# Patient Record
Sex: MALE | Race: WHITE | Employment: OTHER | URBAN - METROPOLITAN AREA
[De-identification: names, ages, dates, MRNs, and addresses within clinical notes are randomized per-mention and may not be internally consistent; named-entity substitution may affect disease eponyms.]

---

## 2017-01-01 ENCOUNTER — ANESTHESIA (OUTPATIENT)
Dept: SURGERY | Age: 82
DRG: 414 | End: 2017-01-01
Payer: MEDICARE

## 2017-01-01 ENCOUNTER — APPOINTMENT (OUTPATIENT)
Dept: NUCLEAR MEDICINE | Age: 82
DRG: 414 | End: 2017-01-01
Payer: MEDICARE

## 2017-01-01 ENCOUNTER — APPOINTMENT (OUTPATIENT)
Dept: GENERAL RADIOLOGY | Age: 82
DRG: 414 | End: 2017-01-01
Attending: INTERNAL MEDICINE
Payer: MEDICARE

## 2017-01-01 ENCOUNTER — HOSPICE ADMISSION (OUTPATIENT)
Dept: HOSPICE | Facility: HOSPICE | Age: 82
End: 2017-01-01

## 2017-01-01 ENCOUNTER — HOSPITAL ENCOUNTER (INPATIENT)
Age: 82
LOS: 14 days | Discharge: HOSPICE | DRG: 414 | End: 2017-03-02
Attending: EMERGENCY MEDICINE | Admitting: INTERNAL MEDICINE
Payer: MEDICARE

## 2017-01-01 ENCOUNTER — APPOINTMENT (OUTPATIENT)
Dept: GENERAL RADIOLOGY | Age: 82
DRG: 414 | End: 2017-01-01
Attending: EMERGENCY MEDICINE
Payer: MEDICARE

## 2017-01-01 ENCOUNTER — APPOINTMENT (OUTPATIENT)
Dept: CT IMAGING | Age: 82
DRG: 414 | End: 2017-01-01
Attending: HOSPITALIST
Payer: MEDICARE

## 2017-01-01 ENCOUNTER — APPOINTMENT (OUTPATIENT)
Dept: ULTRASOUND IMAGING | Age: 82
DRG: 414 | End: 2017-01-01
Attending: INTERNAL MEDICINE
Payer: MEDICARE

## 2017-01-01 ENCOUNTER — APPOINTMENT (OUTPATIENT)
Dept: CT IMAGING | Age: 82
DRG: 414 | End: 2017-01-01
Attending: EMERGENCY MEDICINE
Payer: MEDICARE

## 2017-01-01 ENCOUNTER — APPOINTMENT (OUTPATIENT)
Dept: NUCLEAR MEDICINE | Age: 82
DRG: 414 | End: 2017-01-01
Attending: INTERNAL MEDICINE
Payer: MEDICARE

## 2017-01-01 ENCOUNTER — ANESTHESIA EVENT (OUTPATIENT)
Dept: SURGERY | Age: 82
DRG: 414 | End: 2017-01-01
Payer: MEDICARE

## 2017-01-01 ENCOUNTER — APPOINTMENT (OUTPATIENT)
Dept: CT IMAGING | Age: 82
DRG: 414 | End: 2017-01-01
Attending: INTERNAL MEDICINE
Payer: MEDICARE

## 2017-01-01 ENCOUNTER — APPOINTMENT (OUTPATIENT)
Dept: MRI IMAGING | Age: 82
DRG: 414 | End: 2017-01-01
Attending: INTERNAL MEDICINE
Payer: MEDICARE

## 2017-01-01 VITALS
HEART RATE: 72 BPM | DIASTOLIC BLOOD PRESSURE: 74 MMHG | BODY MASS INDEX: 25.73 KG/M2 | HEIGHT: 69 IN | OXYGEN SATURATION: 92 % | SYSTOLIC BLOOD PRESSURE: 130 MMHG | RESPIRATION RATE: 18 BRPM | TEMPERATURE: 98.2 F | WEIGHT: 173.7 LBS

## 2017-01-01 DIAGNOSIS — R10.11 RUQ ABDOMINAL PAIN: ICD-10-CM

## 2017-01-01 DIAGNOSIS — R07.9 CHEST PAIN, UNSPECIFIED TYPE: Primary | ICD-10-CM

## 2017-01-01 DIAGNOSIS — K81.0 EMPYEMA OF GALLBLADDER: ICD-10-CM

## 2017-01-01 DIAGNOSIS — M54.6 ACUTE MIDLINE THORACIC BACK PAIN: ICD-10-CM

## 2017-01-01 LAB
ABO + RH BLD: NORMAL
ALBUMIN SERPL BCP-MCNC: 1.9 G/DL (ref 3.2–4.6)
ALBUMIN SERPL BCP-MCNC: 2.1 G/DL (ref 3.2–4.6)
ALBUMIN SERPL BCP-MCNC: 2.1 G/DL (ref 3.2–4.6)
ALBUMIN SERPL BCP-MCNC: 2.2 G/DL (ref 3.2–4.6)
ALBUMIN SERPL BCP-MCNC: 3.5 G/DL (ref 3.2–4.6)
ALBUMIN/GLOB SERPL: 0.5 {RATIO} (ref 1.2–3.5)
ALBUMIN/GLOB SERPL: 0.6 {RATIO} (ref 1.2–3.5)
ALBUMIN/GLOB SERPL: 0.9 {RATIO} (ref 1.2–3.5)
ALP SERPL-CCNC: 204 U/L (ref 50–136)
ALP SERPL-CCNC: 253 U/L (ref 50–136)
ALP SERPL-CCNC: 254 U/L (ref 50–136)
ALP SERPL-CCNC: 272 U/L (ref 50–136)
ALP SERPL-CCNC: 63 U/L (ref 50–136)
ALT SERPL-CCNC: 13 U/L (ref 12–65)
ALT SERPL-CCNC: 19 U/L (ref 12–65)
ALT SERPL-CCNC: 21 U/L (ref 12–65)
ALT SERPL-CCNC: 23 U/L (ref 12–65)
ALT SERPL-CCNC: 26 U/L (ref 12–65)
AMORPH CRY URNS QL MICRO: ABNORMAL
ANION GAP BLD CALC-SCNC: 10 MMOL/L (ref 7–16)
ANION GAP BLD CALC-SCNC: 10 MMOL/L (ref 7–16)
ANION GAP BLD CALC-SCNC: 11 MMOL/L (ref 7–16)
ANION GAP BLD CALC-SCNC: 12 MMOL/L (ref 7–16)
ANION GAP BLD CALC-SCNC: 13 MMOL/L (ref 7–16)
ANION GAP BLD CALC-SCNC: 14 MMOL/L (ref 7–16)
ANION GAP BLD CALC-SCNC: 15 MMOL/L (ref 7–16)
ANION GAP BLD CALC-SCNC: 20 MMOL/L (ref 7–16)
ANION GAP BLD CALC-SCNC: 7 MMOL/L (ref 7–16)
ANION GAP BLD CALC-SCNC: 9 MMOL/L (ref 7–16)
APPEARANCE UR: ABNORMAL
APPEARANCE UR: CLEAR
ARTERIAL PATENCY WRIST A: POSITIVE
AST SERPL W P-5'-P-CCNC: 36 U/L (ref 15–37)
AST SERPL W P-5'-P-CCNC: 53 U/L (ref 15–37)
AST SERPL W P-5'-P-CCNC: 56 U/L (ref 15–37)
AST SERPL W P-5'-P-CCNC: 58 U/L (ref 15–37)
AST SERPL W P-5'-P-CCNC: 77 U/L (ref 15–37)
ATRIAL RATE: 59 BPM
ATRIAL RATE: 73 BPM
ATRIAL RATE: 76 BPM
ATRIAL RATE: 87 BPM
BACTERIA SPEC CULT: ABNORMAL
BACTERIA SPEC CULT: ABNORMAL
BACTERIA SPEC CULT: NORMAL
BACTERIA URNS QL MICRO: 0 /HPF
BACTERIA URNS QL MICRO: ABNORMAL /HPF
BASE DEFICIT BLD-SCNC: 4 MMOL/L
BASE DEFICIT BLDA-SCNC: 3.5 MMOL/L (ref 0–2)
BASE DEFICIT BLDA-SCNC: 5.5 MMOL/L (ref 0–2)
BASE DEFICIT BLDA-SCNC: 7.9 MMOL/L (ref 0–2)
BASOPHILS # BLD AUTO: 0 K/UL (ref 0–0.2)
BASOPHILS # BLD: 0 % (ref 0–2)
BDY SITE: ABNORMAL
BILIRUB DIRECT SERPL-MCNC: 0.3 MG/DL
BILIRUB SERPL-MCNC: 0.5 MG/DL (ref 0.2–1.1)
BILIRUB SERPL-MCNC: 0.7 MG/DL (ref 0.2–1.1)
BILIRUB SERPL-MCNC: 0.9 MG/DL (ref 0.2–1.1)
BILIRUB SERPL-MCNC: 1.3 MG/DL (ref 0.2–1.1)
BILIRUB SERPL-MCNC: 1.3 MG/DL (ref 0.2–1.1)
BILIRUB UR QL: ABNORMAL
BILIRUB UR QL: ABNORMAL
BLOOD BANK CMNT PATIENT-IMP: NORMAL
BLOOD GROUP ANTIBODIES SERPL: NORMAL
BUN SERPL-MCNC: 18 MG/DL (ref 8–23)
BUN SERPL-MCNC: 19 MG/DL (ref 8–23)
BUN SERPL-MCNC: 21 MG/DL (ref 8–23)
BUN SERPL-MCNC: 21 MG/DL (ref 8–23)
BUN SERPL-MCNC: 24 MG/DL (ref 8–23)
BUN SERPL-MCNC: 28 MG/DL (ref 8–23)
BUN SERPL-MCNC: 30 MG/DL (ref 8–23)
BUN SERPL-MCNC: 32 MG/DL (ref 8–23)
BUN SERPL-MCNC: 32 MG/DL (ref 8–23)
BUN SERPL-MCNC: 34 MG/DL (ref 8–23)
BUN SERPL-MCNC: 35 MG/DL (ref 8–23)
BUN SERPL-MCNC: 44 MG/DL (ref 8–23)
C3 SERPL-MCNC: 46 MG/DL (ref 82–167)
C4 SERPL-MCNC: 13 MG/DL (ref 14–44)
CA-I BLD-MCNC: 1.09 MMOL/L (ref 1.12–1.32)
CA-I BLD-SCNC: 1.12 MMOL/L (ref 1–1.3)
CALCIUM SERPL-MCNC: 7.5 MG/DL (ref 8.3–10.4)
CALCIUM SERPL-MCNC: 7.7 MG/DL (ref 8.3–10.4)
CALCIUM SERPL-MCNC: 7.9 MG/DL (ref 8.3–10.4)
CALCIUM SERPL-MCNC: 7.9 MG/DL (ref 8.3–10.4)
CALCIUM SERPL-MCNC: 8 MG/DL (ref 8.3–10.4)
CALCIUM SERPL-MCNC: 8.1 MG/DL (ref 8.3–10.4)
CALCIUM SERPL-MCNC: 8.1 MG/DL (ref 8.3–10.4)
CALCIUM SERPL-MCNC: 8.2 MG/DL (ref 8.3–10.4)
CALCIUM SERPL-MCNC: 8.6 MG/DL (ref 8.3–10.4)
CALCIUM SERPL-MCNC: 9 MG/DL (ref 8.3–10.4)
CALCULATED P AXIS, ECG09: 52 DEGREES
CALCULATED P AXIS, ECG09: 61 DEGREES
CALCULATED P AXIS, ECG09: 91 DEGREES
CALCULATED P AXIS, ECG09: NORMAL DEGREES
CALCULATED R AXIS, ECG10: -44 DEGREES
CALCULATED R AXIS, ECG10: 117 DEGREES
CALCULATED R AXIS, ECG10: 129 DEGREES
CALCULATED R AXIS, ECG10: 48 DEGREES
CALCULATED T AXIS, ECG11: -12 DEGREES
CALCULATED T AXIS, ECG11: -32 DEGREES
CALCULATED T AXIS, ECG11: -52 DEGREES
CALCULATED T AXIS, ECG11: 28 DEGREES
CASTS URNS QL MICRO: 0 /LPF
CASTS URNS QL MICRO: ABNORMAL /LPF
CHLORIDE BLDA-SCNC: 111 MMOL/L (ref 98–106)
CHLORIDE SERPL-SCNC: 101 MMOL/L (ref 98–107)
CHLORIDE SERPL-SCNC: 101 MMOL/L (ref 98–107)
CHLORIDE SERPL-SCNC: 103 MMOL/L (ref 98–107)
CHLORIDE SERPL-SCNC: 103 MMOL/L (ref 98–107)
CHLORIDE SERPL-SCNC: 106 MMOL/L (ref 98–107)
CHLORIDE SERPL-SCNC: 108 MMOL/L (ref 98–107)
CHLORIDE SERPL-SCNC: 109 MMOL/L (ref 98–107)
CHLORIDE SERPL-SCNC: 110 MMOL/L (ref 98–107)
CHLORIDE SERPL-SCNC: 110 MMOL/L (ref 98–107)
CHOLEST SERPL-MCNC: 127 MG/DL
CO2 SERPL-SCNC: 15 MMOL/L (ref 21–32)
CO2 SERPL-SCNC: 19 MMOL/L (ref 21–32)
CO2 SERPL-SCNC: 21 MMOL/L (ref 21–32)
CO2 SERPL-SCNC: 23 MMOL/L (ref 21–32)
CO2 SERPL-SCNC: 24 MMOL/L (ref 21–32)
CO2 SERPL-SCNC: 24 MMOL/L (ref 21–32)
CO2 SERPL-SCNC: 25 MMOL/L (ref 21–32)
CO2 SERPL-SCNC: 26 MMOL/L (ref 21–32)
CO2 SERPL-SCNC: 26 MMOL/L (ref 21–32)
CO2 SERPL-SCNC: 28 MMOL/L (ref 21–32)
COHGB MFR BLD: 0.3 % (ref 0.5–1.5)
COHGB MFR BLD: 1 % (ref 0.5–1.5)
COHGB MFR BLD: 1.1 % (ref 0.5–1.5)
COLOR UR: ABNORMAL
COLOR UR: ABNORMAL
CREAT SERPL-MCNC: 1.05 MG/DL (ref 0.8–1.5)
CREAT SERPL-MCNC: 1.08 MG/DL (ref 0.8–1.5)
CREAT SERPL-MCNC: 1.14 MG/DL (ref 0.8–1.5)
CREAT SERPL-MCNC: 1.22 MG/DL (ref 0.8–1.5)
CREAT SERPL-MCNC: 1.28 MG/DL (ref 0.8–1.5)
CREAT SERPL-MCNC: 1.51 MG/DL (ref 0.8–1.5)
CREAT SERPL-MCNC: 1.52 MG/DL (ref 0.8–1.5)
CREAT SERPL-MCNC: 1.59 MG/DL (ref 0.8–1.5)
CREAT SERPL-MCNC: 1.72 MG/DL (ref 0.8–1.5)
CREAT SERPL-MCNC: 1.87 MG/DL (ref 0.8–1.5)
CREAT SERPL-MCNC: 1.91 MG/DL (ref 0.8–1.5)
CREAT SERPL-MCNC: 1.93 MG/DL (ref 0.8–1.5)
CREAT SERPL-MCNC: 2.63 MG/DL (ref 0.8–1.5)
DIAGNOSIS, 93000: NORMAL
DIASTOLIC BP, ECG02: NORMAL MMHG
DIFFERENTIAL METHOD BLD: ABNORMAL
DO-HGB BLD-MCNC: 4 % (ref 0–5)
DO-HGB BLD-MCNC: 5 % (ref 0–5)
DO-HGB BLD-MCNC: 6 % (ref 0–5)
EOSINOPHIL # BLD: 0 K/UL (ref 0–0.8)
EOSINOPHIL # BLD: 0.1 K/UL (ref 0–0.8)
EOSINOPHIL # BLD: 0.1 K/UL (ref 0–0.8)
EOSINOPHIL # BLD: 0.2 K/UL (ref 0–0.8)
EOSINOPHIL # BLD: 0.3 K/UL (ref 0–0.8)
EOSINOPHIL NFR BLD: 0 % (ref 0.5–7.8)
EOSINOPHIL NFR BLD: 1 % (ref 0.5–7.8)
EOSINOPHIL NFR BLD: 2 % (ref 0.5–7.8)
EOSINOPHIL NFR BLD: 3 % (ref 0.5–7.8)
EPI CELLS #/AREA URNS HPF: ABNORMAL /HPF
EPI CELLS #/AREA URNS HPF: ABNORMAL /HPF
ERYTHROCYTE [DISTWIDTH] IN BLOOD BY AUTOMATED COUNT: 12.1 % (ref 11.9–14.6)
ERYTHROCYTE [DISTWIDTH] IN BLOOD BY AUTOMATED COUNT: 12.4 % (ref 11.9–14.6)
ERYTHROCYTE [DISTWIDTH] IN BLOOD BY AUTOMATED COUNT: 12.5 % (ref 11.9–14.6)
ERYTHROCYTE [DISTWIDTH] IN BLOOD BY AUTOMATED COUNT: 12.6 % (ref 11.9–14.6)
ERYTHROCYTE [DISTWIDTH] IN BLOOD BY AUTOMATED COUNT: 12.7 % (ref 11.9–14.6)
ERYTHROCYTE [DISTWIDTH] IN BLOOD BY AUTOMATED COUNT: 12.9 % (ref 11.9–14.6)
ERYTHROCYTE [DISTWIDTH] IN BLOOD BY AUTOMATED COUNT: 13 % (ref 11.9–14.6)
ERYTHROCYTE [DISTWIDTH] IN BLOOD BY AUTOMATED COUNT: 13.1 % (ref 11.9–14.6)
ERYTHROCYTE [DISTWIDTH] IN BLOOD BY AUTOMATED COUNT: 13.1 % (ref 11.9–14.6)
ERYTHROCYTE [DISTWIDTH] IN BLOOD BY AUTOMATED COUNT: 13.7 % (ref 11.9–14.6)
ERYTHROCYTE [DISTWIDTH] IN BLOOD BY AUTOMATED COUNT: 13.7 % (ref 11.9–14.6)
FLUAV AG NPH QL IA: NEGATIVE
FLUBV AG NPH QL IA: NEGATIVE
FOLATE SERPL-MCNC: 19.9 NG/ML (ref 3.1–17.5)
GAS FLOW.O2 O2 DELIVERY SYS: 3 L/MIN
GAS FLOW.O2 O2 DELIVERY SYS: 4 L/MIN
GAS FLOW.O2 O2 DELIVERY SYS: 4 L/MIN
GLOBULIN SER CALC-MCNC: 3.9 G/DL (ref 2.3–3.5)
GLOBULIN SER CALC-MCNC: 4 G/DL (ref 2.3–3.5)
GLOBULIN SER CALC-MCNC: 4.1 G/DL (ref 2.3–3.5)
GLOBULIN SER CALC-MCNC: 4.1 G/DL (ref 2.3–3.5)
GLOBULIN SER CALC-MCNC: 4.4 G/DL (ref 2.3–3.5)
GLUCOSE BLD STRIP.AUTO-MCNC: 100 MG/DL (ref 65–100)
GLUCOSE BLD STRIP.AUTO-MCNC: 102 MG/DL (ref 65–100)
GLUCOSE BLD STRIP.AUTO-MCNC: 110 MG/DL (ref 65–100)
GLUCOSE BLD STRIP.AUTO-MCNC: 115 MG/DL (ref 65–100)
GLUCOSE BLD STRIP.AUTO-MCNC: 116 MG/DL (ref 65–100)
GLUCOSE BLD STRIP.AUTO-MCNC: 116 MG/DL (ref 65–100)
GLUCOSE BLD STRIP.AUTO-MCNC: 122 MG/DL (ref 65–100)
GLUCOSE BLD STRIP.AUTO-MCNC: 125 MG/DL (ref 65–100)
GLUCOSE BLD STRIP.AUTO-MCNC: 125 MG/DL (ref 65–100)
GLUCOSE BLD STRIP.AUTO-MCNC: 126 MG/DL (ref 65–100)
GLUCOSE BLD STRIP.AUTO-MCNC: 127 MG/DL (ref 65–100)
GLUCOSE BLD STRIP.AUTO-MCNC: 129 MG/DL (ref 65–100)
GLUCOSE BLD STRIP.AUTO-MCNC: 130 MG/DL (ref 65–100)
GLUCOSE BLD STRIP.AUTO-MCNC: 131 MG/DL (ref 65–100)
GLUCOSE BLD STRIP.AUTO-MCNC: 132 MG/DL (ref 65–100)
GLUCOSE BLD STRIP.AUTO-MCNC: 132 MG/DL (ref 65–100)
GLUCOSE BLD STRIP.AUTO-MCNC: 133 MG/DL (ref 65–100)
GLUCOSE BLD STRIP.AUTO-MCNC: 137 MG/DL (ref 65–100)
GLUCOSE BLD STRIP.AUTO-MCNC: 138 MG/DL (ref 65–100)
GLUCOSE BLD STRIP.AUTO-MCNC: 141 MG/DL (ref 65–100)
GLUCOSE BLD STRIP.AUTO-MCNC: 142 MG/DL (ref 65–100)
GLUCOSE BLD STRIP.AUTO-MCNC: 144 MG/DL (ref 65–100)
GLUCOSE BLD STRIP.AUTO-MCNC: 145 MG/DL (ref 65–100)
GLUCOSE BLD STRIP.AUTO-MCNC: 146 MG/DL (ref 65–100)
GLUCOSE BLD STRIP.AUTO-MCNC: 148 MG/DL (ref 65–100)
GLUCOSE BLD STRIP.AUTO-MCNC: 151 MG/DL (ref 65–100)
GLUCOSE BLD STRIP.AUTO-MCNC: 151 MG/DL (ref 65–100)
GLUCOSE BLD STRIP.AUTO-MCNC: 153 MG/DL (ref 65–100)
GLUCOSE BLD STRIP.AUTO-MCNC: 156 MG/DL (ref 65–100)
GLUCOSE BLD STRIP.AUTO-MCNC: 159 MG/DL (ref 65–100)
GLUCOSE BLD STRIP.AUTO-MCNC: 162 MG/DL (ref 65–100)
GLUCOSE BLD STRIP.AUTO-MCNC: 168 MG/DL (ref 65–100)
GLUCOSE BLD STRIP.AUTO-MCNC: 171 MG/DL (ref 65–100)
GLUCOSE BLD STRIP.AUTO-MCNC: 174 MG/DL (ref 65–100)
GLUCOSE BLD STRIP.AUTO-MCNC: 176 MG/DL (ref 65–100)
GLUCOSE BLD STRIP.AUTO-MCNC: 177 MG/DL (ref 65–100)
GLUCOSE BLD STRIP.AUTO-MCNC: 180 MG/DL (ref 65–100)
GLUCOSE BLD STRIP.AUTO-MCNC: 184 MG/DL (ref 65–100)
GLUCOSE BLD STRIP.AUTO-MCNC: 185 MG/DL (ref 65–100)
GLUCOSE BLD STRIP.AUTO-MCNC: 186 MG/DL (ref 65–100)
GLUCOSE BLD STRIP.AUTO-MCNC: 189 MG/DL (ref 65–100)
GLUCOSE BLD STRIP.AUTO-MCNC: 195 MG/DL (ref 65–100)
GLUCOSE BLD STRIP.AUTO-MCNC: 195 MG/DL (ref 65–100)
GLUCOSE BLD STRIP.AUTO-MCNC: 196 MG/DL (ref 65–100)
GLUCOSE BLD STRIP.AUTO-MCNC: 196 MG/DL (ref 70–105)
GLUCOSE BLD STRIP.AUTO-MCNC: 199 MG/DL (ref 65–100)
GLUCOSE BLD STRIP.AUTO-MCNC: 209 MG/DL (ref 65–100)
GLUCOSE BLD STRIP.AUTO-MCNC: 215 MG/DL (ref 65–100)
GLUCOSE BLD STRIP.AUTO-MCNC: 217 MG/DL (ref 65–100)
GLUCOSE BLD STRIP.AUTO-MCNC: 218 MG/DL (ref 65–100)
GLUCOSE BLD STRIP.AUTO-MCNC: 218 MG/DL (ref 65–100)
GLUCOSE BLD STRIP.AUTO-MCNC: 219 MG/DL (ref 65–100)
GLUCOSE BLD STRIP.AUTO-MCNC: 220 MG/DL (ref 65–100)
GLUCOSE BLD STRIP.AUTO-MCNC: 228 MG/DL (ref 65–100)
GLUCOSE BLD STRIP.AUTO-MCNC: 233 MG/DL (ref 65–100)
GLUCOSE BLD STRIP.AUTO-MCNC: 271 MG/DL (ref 65–100)
GLUCOSE BLD STRIP.AUTO-MCNC: 273 MG/DL (ref 65–100)
GLUCOSE BLD STRIP.AUTO-MCNC: 317 MG/DL (ref 65–100)
GLUCOSE SERPL-MCNC: 105 MG/DL (ref 65–100)
GLUCOSE SERPL-MCNC: 116 MG/DL (ref 65–100)
GLUCOSE SERPL-MCNC: 120 MG/DL (ref 65–100)
GLUCOSE SERPL-MCNC: 122 MG/DL (ref 65–100)
GLUCOSE SERPL-MCNC: 132 MG/DL (ref 65–100)
GLUCOSE SERPL-MCNC: 135 MG/DL (ref 65–100)
GLUCOSE SERPL-MCNC: 139 MG/DL (ref 65–100)
GLUCOSE SERPL-MCNC: 143 MG/DL (ref 65–100)
GLUCOSE SERPL-MCNC: 168 MG/DL (ref 65–100)
GLUCOSE SERPL-MCNC: 176 MG/DL (ref 65–100)
GLUCOSE SERPL-MCNC: 186 MG/DL (ref 65–100)
GLUCOSE SERPL-MCNC: 197 MG/DL (ref 65–100)
GLUCOSE UR STRIP.AUTO-MCNC: NEGATIVE MG/DL
GLUCOSE UR STRIP.AUTO-MCNC: NEGATIVE MG/DL
GRAM STN SPEC: ABNORMAL
GRAM STN SPEC: ABNORMAL
HCO3 BLD-SCNC: 22.6 MMOL/L (ref 22–26)
HCO3 BLDA-SCNC: 15 MMOL/L (ref 22–26)
HCO3 BLDA-SCNC: 18 MMOL/L (ref 22–26)
HCO3 BLDA-SCNC: 21 MMOL/L (ref 22–26)
HCT VFR BLD AUTO: 24.7 % (ref 41.1–50.3)
HCT VFR BLD AUTO: 26 % (ref 41.1–50.3)
HCT VFR BLD AUTO: 26.4 % (ref 41.1–50.3)
HCT VFR BLD AUTO: 27.6 % (ref 41.1–50.3)
HCT VFR BLD AUTO: 28.3 % (ref 41.1–50.3)
HCT VFR BLD AUTO: 28.6 % (ref 41.1–50.3)
HCT VFR BLD AUTO: 29 % (ref 41.1–50.3)
HCT VFR BLD AUTO: 29.8 % (ref 41.1–50.3)
HCT VFR BLD AUTO: 31.1 % (ref 41.1–50.3)
HCT VFR BLD AUTO: 31.9 % (ref 41.1–50.3)
HCT VFR BLD AUTO: 35.8 % (ref 41.1–50.3)
HCT VFR BLD AUTO: 36.1 % (ref 41.1–50.3)
HCT VFR BLD AUTO: 39.3 % (ref 41.1–50.3)
HDLC SERPL-MCNC: 51 MG/DL (ref 40–60)
HDLC SERPL: 2.5 {RATIO}
HGB BLD-MCNC: 10.6 G/DL (ref 13.6–17.2)
HGB BLD-MCNC: 10.8 G/DL (ref 13.6–17.2)
HGB BLD-MCNC: 12.3 G/DL (ref 13.6–17.2)
HGB BLD-MCNC: 12.4 G/DL (ref 13.6–17.2)
HGB BLD-MCNC: 13.5 G/DL (ref 13.6–17.2)
HGB BLD-MCNC: 8 G/DL (ref 13.6–17.2)
HGB BLD-MCNC: 8.5 G/DL (ref 13.6–17.2)
HGB BLD-MCNC: 8.6 G/DL (ref 13.6–17.2)
HGB BLD-MCNC: 9.4 G/DL (ref 13.6–17.2)
HGB BLD-MCNC: 9.5 G/DL (ref 13.6–17.2)
HGB BLD-MCNC: 9.6 G/DL (ref 13.6–17.2)
HGB BLD-MCNC: 9.8 G/DL (ref 13.6–17.2)
HGB BLD-MCNC: 9.9 G/DL (ref 13.6–17.2)
HGB BLDMV-MCNC: 8.5 GM/DL (ref 11.7–15)
HGB BLDMV-MCNC: 9.1 GM/DL (ref 11.7–15)
HGB BLDMV-MCNC: 9.5 GM/DL (ref 11.7–15)
HGB UR QL STRIP: ABNORMAL
HGB UR QL STRIP: NEGATIVE
IMM GRANULOCYTES # BLD: 0 K/UL (ref 0–0.5)
IMM GRANULOCYTES # BLD: 0.1 K/UL (ref 0–0.5)
IMM GRANULOCYTES NFR BLD AUTO: 0 % (ref 0–5)
IMM GRANULOCYTES NFR BLD AUTO: 0 % (ref 0–5)
IMM GRANULOCYTES NFR BLD AUTO: 0.3 % (ref 0–5)
IMM GRANULOCYTES NFR BLD AUTO: 0.3 % (ref 0–5)
IMM GRANULOCYTES NFR BLD AUTO: 0.7 % (ref 0–5)
IMM GRANULOCYTES NFR BLD AUTO: 0.9 % (ref 0–5)
KETONES UR QL STRIP.AUTO: ABNORMAL MG/DL
KETONES UR QL STRIP.AUTO: ABNORMAL MG/DL
LACTATE SERPL-SCNC: 4.1 MMOL/L (ref 0.4–2)
LDLC SERPL CALC-MCNC: 60 MG/DL
LEUKOCYTE ESTERASE UR QL STRIP.AUTO: ABNORMAL
LEUKOCYTE ESTERASE UR QL STRIP.AUTO: NEGATIVE
LIPASE SERPL-CCNC: 142 U/L (ref 73–393)
LIPID PROFILE,FLP: NORMAL
LYMPHOCYTES # BLD AUTO: 11 % (ref 13–44)
LYMPHOCYTES # BLD AUTO: 12 % (ref 13–44)
LYMPHOCYTES # BLD AUTO: 17 % (ref 13–44)
LYMPHOCYTES # BLD AUTO: 20 % (ref 13–44)
LYMPHOCYTES # BLD AUTO: 21 % (ref 13–44)
LYMPHOCYTES # BLD AUTO: 23 % (ref 13–44)
LYMPHOCYTES # BLD AUTO: 25 % (ref 13–44)
LYMPHOCYTES # BLD AUTO: 27 % (ref 13–44)
LYMPHOCYTES # BLD AUTO: 27 % (ref 13–44)
LYMPHOCYTES # BLD: 1.1 K/UL (ref 0.5–4.6)
LYMPHOCYTES # BLD: 1.3 K/UL (ref 0.5–4.6)
LYMPHOCYTES # BLD: 1.5 K/UL (ref 0.5–4.6)
LYMPHOCYTES # BLD: 1.8 K/UL (ref 0.5–4.6)
LYMPHOCYTES # BLD: 1.8 K/UL (ref 0.5–4.6)
LYMPHOCYTES # BLD: 1.9 K/UL (ref 0.5–4.6)
LYMPHOCYTES # BLD: 2 K/UL (ref 0.5–4.6)
LYMPHOCYTES # BLD: 2.2 K/UL (ref 0.5–4.6)
LYMPHOCYTES # BLD: 2.3 K/UL (ref 0.5–4.6)
MAGNESIUM SERPL-MCNC: 1.9 MG/DL (ref 1.8–2.4)
MAGNESIUM SERPL-MCNC: 2 MG/DL (ref 1.8–2.4)
MAGNESIUM SERPL-MCNC: 2.2 MG/DL (ref 1.8–2.4)
MCH RBC QN AUTO: 32.2 PG (ref 26.1–32.9)
MCH RBC QN AUTO: 32.4 PG (ref 26.1–32.9)
MCH RBC QN AUTO: 32.6 PG (ref 26.1–32.9)
MCH RBC QN AUTO: 32.7 PG (ref 26.1–32.9)
MCH RBC QN AUTO: 32.7 PG (ref 26.1–32.9)
MCH RBC QN AUTO: 32.8 PG (ref 26.1–32.9)
MCH RBC QN AUTO: 32.8 PG (ref 26.1–32.9)
MCH RBC QN AUTO: 33.1 PG (ref 26.1–32.9)
MCH RBC QN AUTO: 33.2 PG (ref 26.1–32.9)
MCH RBC QN AUTO: 33.4 PG (ref 26.1–32.9)
MCH RBC QN AUTO: 33.4 PG (ref 26.1–32.9)
MCHC RBC AUTO-ENTMCNC: 32.6 G/DL (ref 31.4–35)
MCHC RBC AUTO-ENTMCNC: 32.7 G/DL (ref 31.4–35)
MCHC RBC AUTO-ENTMCNC: 33.2 G/DL (ref 31.4–35)
MCHC RBC AUTO-ENTMCNC: 33.6 G/DL (ref 31.4–35)
MCHC RBC AUTO-ENTMCNC: 33.6 G/DL (ref 31.4–35)
MCHC RBC AUTO-ENTMCNC: 33.9 G/DL (ref 31.4–35)
MCHC RBC AUTO-ENTMCNC: 34.1 G/DL (ref 31.4–35)
MCHC RBC AUTO-ENTMCNC: 34.4 G/DL (ref 31.4–35)
MCHC RBC AUTO-ENTMCNC: 34.6 G/DL (ref 31.4–35)
MCV RBC AUTO: 100.4 FL (ref 79.6–97.8)
MCV RBC AUTO: 100.4 FL (ref 79.6–97.8)
MCV RBC AUTO: 95.7 FL (ref 79.6–97.8)
MCV RBC AUTO: 96.2 FL (ref 79.6–97.8)
MCV RBC AUTO: 96.5 FL (ref 79.6–97.8)
MCV RBC AUTO: 96.6 FL (ref 79.6–97.8)
MCV RBC AUTO: 97.1 FL (ref 79.6–97.8)
MCV RBC AUTO: 97.3 FL (ref 79.6–97.8)
MCV RBC AUTO: 97.3 FL (ref 79.6–97.8)
MCV RBC AUTO: 97.6 FL (ref 79.6–97.8)
MCV RBC AUTO: 97.9 FL (ref 79.6–97.8)
METHGB MFR BLD: 0.3 % (ref 0–1.5)
METHGB MFR BLD: 0.3 % (ref 0–1.5)
METHGB MFR BLD: 0.4 % (ref 0–1.5)
MM INDURATION POC: NORMAL MM (ref 0–5)
MONOCYTES # BLD: 0.6 K/UL (ref 0.1–1.3)
MONOCYTES # BLD: 0.9 K/UL (ref 0.1–1.3)
MONOCYTES # BLD: 1 K/UL (ref 0.1–1.3)
MONOCYTES # BLD: 1.1 K/UL (ref 0.1–1.3)
MONOCYTES # BLD: 1.5 K/UL (ref 0.1–1.3)
MONOCYTES NFR BLD AUTO: 10 % (ref 4–12)
MONOCYTES NFR BLD AUTO: 11 % (ref 4–12)
MONOCYTES NFR BLD AUTO: 12 % (ref 4–12)
MONOCYTES NFR BLD AUTO: 12 % (ref 4–12)
MONOCYTES NFR BLD AUTO: 17 % (ref 4–12)
MONOCYTES NFR BLD AUTO: 6 % (ref 4–12)
MONOCYTES NFR BLD AUTO: 7 % (ref 4–12)
MONOCYTES NFR BLD AUTO: 8 % (ref 4–12)
MONOCYTES NFR BLD AUTO: 9 % (ref 4–12)
NEUTS SEG # BLD: 4.2 K/UL (ref 1.7–8.2)
NEUTS SEG # BLD: 4.7 K/UL (ref 1.7–8.2)
NEUTS SEG # BLD: 5.2 K/UL (ref 1.7–8.2)
NEUTS SEG # BLD: 5.5 K/UL (ref 1.7–8.2)
NEUTS SEG # BLD: 6 K/UL (ref 1.7–8.2)
NEUTS SEG # BLD: 6.1 K/UL (ref 1.7–8.2)
NEUTS SEG # BLD: 6.5 K/UL (ref 1.7–8.2)
NEUTS SEG # BLD: 7.9 K/UL (ref 1.7–8.2)
NEUTS SEG # BLD: 9.5 K/UL (ref 1.7–8.2)
NEUTS SEG NFR BLD AUTO: 58 % (ref 43–78)
NEUTS SEG NFR BLD AUTO: 60 % (ref 43–78)
NEUTS SEG NFR BLD AUTO: 62 % (ref 43–78)
NEUTS SEG NFR BLD AUTO: 63 % (ref 43–78)
NEUTS SEG NFR BLD AUTO: 65 % (ref 43–78)
NEUTS SEG NFR BLD AUTO: 66 % (ref 43–78)
NEUTS SEG NFR BLD AUTO: 71 % (ref 43–78)
NEUTS SEG NFR BLD AUTO: 79 % (ref 43–78)
NEUTS SEG NFR BLD AUTO: 81 % (ref 43–78)
NITRITE UR QL STRIP.AUTO: NEGATIVE
NITRITE UR QL STRIP.AUTO: NEGATIVE
OTHER OBSERVATIONS,UCOM: ABNORMAL
OXYHGB MFR BLDA: 92.2 % (ref 94–97)
OXYHGB MFR BLDA: 94.3 % (ref 94–97)
OXYHGB MFR BLDA: 94.9 % (ref 94–97)
P-R INTERVAL, ECG05: 180 MS
P-R INTERVAL, ECG05: 184 MS
P-R INTERVAL, ECG05: 224 MS
P-R INTERVAL, ECG05: NORMAL MS
PCO2 BLD: 44.6 MMHG (ref 35–45)
PCO2 BLDA: 23 MMHG (ref 35–45)
PCO2 BLDA: 27 MMHG (ref 35–45)
PCO2 BLDA: 36 MMHG (ref 35–45)
PH BLD: 7.31 [PH] (ref 7.35–7.45)
PH BLDA: 7.39 [PH] (ref 7.35–7.45)
PH BLDA: 7.43 [PH] (ref 7.35–7.45)
PH BLDA: 7.44 [PH] (ref 7.35–7.45)
PH UR STRIP: 5 [PH] (ref 5–9)
PH UR STRIP: 7.5 [PH] (ref 5–9)
PHOSPHATE SERPL-MCNC: 2.6 MG/DL (ref 2.3–3.7)
PHOSPHATE SERPL-MCNC: 2.6 MG/DL (ref 2.3–3.7)
PLATELET # BLD AUTO: 112 K/UL (ref 150–450)
PLATELET # BLD AUTO: 122 K/UL (ref 150–450)
PLATELET # BLD AUTO: 136 K/UL (ref 150–450)
PLATELET # BLD AUTO: 137 K/UL (ref 150–450)
PLATELET # BLD AUTO: 165 K/UL (ref 150–450)
PLATELET # BLD AUTO: 191 K/UL (ref 150–450)
PLATELET # BLD AUTO: 193 K/UL (ref 150–450)
PLATELET # BLD AUTO: 205 K/UL (ref 150–450)
PLATELET # BLD AUTO: 218 K/UL (ref 150–450)
PLATELET # BLD AUTO: 233 K/UL (ref 150–450)
PLATELET # BLD AUTO: 274 K/UL (ref 150–450)
PLATELET COMMENTS,PCOM: ADEQUATE
PMV BLD AUTO: 10 FL (ref 10.8–14.1)
PMV BLD AUTO: 10.4 FL (ref 10.8–14.1)
PMV BLD AUTO: 10.4 FL (ref 10.8–14.1)
PMV BLD AUTO: 10.5 FL (ref 10.8–14.1)
PMV BLD AUTO: 10.5 FL (ref 10.8–14.1)
PMV BLD AUTO: 10.7 FL (ref 10.8–14.1)
PMV BLD AUTO: 10.7 FL (ref 10.8–14.1)
PMV BLD AUTO: 10.8 FL (ref 10.8–14.1)
PMV BLD AUTO: 10.9 FL (ref 10.8–14.1)
PMV BLD AUTO: 10.9 FL (ref 10.8–14.1)
PMV BLD AUTO: 11 FL (ref 10.8–14.1)
PO2 BLD: 230 MMHG (ref 80–105)
PO2 BLDA: 75 MMHG (ref 75–100)
PO2 BLDA: 83 MMHG (ref 75–100)
PO2 BLDA: 86 MMHG (ref 75–100)
POTASSIUM BLD-SCNC: 4.3 MMOL/L (ref 3.5–5.1)
POTASSIUM BLDA-SCNC: 3.84 MMOL/L (ref 3.5–5.3)
POTASSIUM SERPL-SCNC: 3.3 MMOL/L (ref 3.5–5.1)
POTASSIUM SERPL-SCNC: 3.5 MMOL/L (ref 3.5–5.1)
POTASSIUM SERPL-SCNC: 3.6 MMOL/L (ref 3.5–5.1)
POTASSIUM SERPL-SCNC: 3.6 MMOL/L (ref 3.5–5.1)
POTASSIUM SERPL-SCNC: 3.7 MMOL/L (ref 3.5–5.1)
POTASSIUM SERPL-SCNC: 3.8 MMOL/L (ref 3.5–5.1)
POTASSIUM SERPL-SCNC: 4 MMOL/L (ref 3.5–5.1)
POTASSIUM SERPL-SCNC: 4.5 MMOL/L (ref 3.5–5.1)
POTASSIUM SERPL-SCNC: 4.6 MMOL/L (ref 3.5–5.1)
POTASSIUM SERPL-SCNC: 5.7 MMOL/L (ref 3.5–5.1)
POTASSIUM SERPL-SCNC: 5.8 MMOL/L (ref 3.5–5.1)
POTASSIUM SERPL-SCNC: 5.9 MMOL/L (ref 3.5–5.1)
POTASSIUM SERPL-SCNC: 6.2 MMOL/L (ref 3.5–5.1)
PPD POC: NORMAL NEGATIVE
PROCALCITONIN SERPL-MCNC: 1 NG/ML
PROT SERPL-MCNC: 6 G/DL (ref 6.3–8.2)
PROT SERPL-MCNC: 6.1 G/DL (ref 6.3–8.2)
PROT SERPL-MCNC: 6.1 G/DL (ref 6.3–8.2)
PROT SERPL-MCNC: 6.5 G/DL (ref 6.3–8.2)
PROT SERPL-MCNC: 7.6 G/DL (ref 6.3–8.2)
PROT UR STRIP-MCNC: 100 MG/DL
PROT UR STRIP-MCNC: 30 MG/DL
Q-T INTERVAL, ECG07: 398 MS
Q-T INTERVAL, ECG07: 404 MS
Q-T INTERVAL, ECG07: 456 MS
Q-T INTERVAL, ECG07: 554 MS
QRS DURATION, ECG06: 130 MS
QRS DURATION, ECG06: 130 MS
QRS DURATION, ECG06: 138 MS
QRS DURATION, ECG06: 144 MS
QTC CALCULATION (BEZET), ECG08: 447 MS
QTC CALCULATION (BEZET), ECG08: 451 MS
QTC CALCULATION (BEZET), ECG08: 486 MS
QTC CALCULATION (BEZET), ECG08: 576 MS
RBC # BLD AUTO: 2.59 M/UL (ref 4.23–5.67)
RBC # BLD AUTO: 2.63 M/UL (ref 4.23–5.67)
RBC # BLD AUTO: 2.87 M/UL (ref 4.23–5.67)
RBC # BLD AUTO: 2.93 M/UL (ref 4.23–5.67)
RBC # BLD AUTO: 2.94 M/UL (ref 4.23–5.67)
RBC # BLD AUTO: 3.07 M/UL (ref 4.23–5.67)
RBC # BLD AUTO: 3.25 M/UL (ref 4.23–5.67)
RBC # BLD AUTO: 3.26 M/UL (ref 4.23–5.67)
RBC # BLD AUTO: 3.7 M/UL (ref 4.23–5.67)
RBC # BLD AUTO: 3.71 M/UL (ref 4.23–5.67)
RBC # BLD AUTO: 4.04 M/UL (ref 4.23–5.67)
RBC #/AREA URNS HPF: ABNORMAL /HPF
RBC #/AREA URNS HPF: ABNORMAL /HPF
RBC MORPH BLD: ABNORMAL
SAO2 % BLD: 100 % (ref 95–98)
SAO2 % BLD: 94 % (ref 92–98.5)
SAO2 % BLD: 95 % (ref 92–98.5)
SAO2 % BLD: 96 % (ref 92–98.5)
SERVICE CMNT-IMP: ABNORMAL
SERVICE CMNT-IMP: ABNORMAL
SERVICE CMNT-IMP: NORMAL
SODIUM BLD-SCNC: 138 MMOL/L (ref 138–146)
SODIUM BLDA-SCNC: 139.3 MMOL/L (ref 135–148)
SODIUM SERPL-SCNC: 138 MMOL/L (ref 136–145)
SODIUM SERPL-SCNC: 139 MMOL/L (ref 136–145)
SODIUM SERPL-SCNC: 140 MMOL/L (ref 136–145)
SODIUM SERPL-SCNC: 142 MMOL/L (ref 136–145)
SODIUM SERPL-SCNC: 143 MMOL/L (ref 136–145)
SODIUM SERPL-SCNC: 143 MMOL/L (ref 136–145)
SODIUM SERPL-SCNC: 144 MMOL/L (ref 136–145)
SODIUM SERPL-SCNC: 144 MMOL/L (ref 136–145)
SODIUM SERPL-SCNC: 145 MMOL/L (ref 136–145)
SODIUM SERPL-SCNC: 145 MMOL/L (ref 136–145)
SP GR UR REFRACTOMETRY: 1.03 (ref 1–1.02)
SP GR UR REFRACTOMETRY: 1.05 (ref 1–1.02)
SPECIMEN EXP DATE BLD: NORMAL
SYSTOLIC BP, ECG01: NORMAL MMHG
TRIGL SERPL-MCNC: 80 MG/DL (ref 35–150)
TROPONIN I BLD-MCNC: 0 NG/ML (ref 0–0.08)
TROPONIN I BLD-MCNC: 0 NG/ML (ref 0–0.08)
TROPONIN I SERPL-MCNC: <0.02 NG/ML (ref 0.02–0.05)
TROPONIN I SERPL-MCNC: <0.02 NG/ML (ref 0.02–0.05)
TSH SERPL DL<=0.005 MIU/L-ACNC: 1.41 UIU/ML (ref 0.36–3.74)
UROBILINOGEN UR QL STRIP.AUTO: 1 EU/DL (ref 0.2–1)
UROBILINOGEN UR QL STRIP.AUTO: 1 EU/DL (ref 0.2–1)
VANCOMYCIN TROUGH SERPL-MCNC: 11.1 UG/ML (ref 5–20)
VANCOMYCIN TROUGH SERPL-MCNC: 17.9 UG/ML (ref 5–20)
VANCOMYCIN TROUGH SERPL-MCNC: 19.8 UG/ML (ref 5–20)
VENTILATION MODE VENT: ABNORMAL
VENTRICULAR RATE, ECG03: 59 BPM
VENTRICULAR RATE, ECG03: 65 BPM
VENTRICULAR RATE, ECG03: 76 BPM
VENTRICULAR RATE, ECG03: 87 BPM
VIT B12 SERPL-MCNC: 828 PG/ML (ref 193–986)
VLDLC SERPL CALC-MCNC: 16 MG/DL (ref 6–23)
WBC # BLD AUTO: 10 K/UL (ref 4.3–11.1)
WBC # BLD AUTO: 11.6 K/UL (ref 4.3–11.1)
WBC # BLD AUTO: 17.4 K/UL (ref 4.3–11.1)
WBC # BLD AUTO: 6.9 K/UL (ref 4.3–11.1)
WBC # BLD AUTO: 7.2 K/UL (ref 4.3–11.1)
WBC # BLD AUTO: 8.1 K/UL (ref 4.3–11.1)
WBC # BLD AUTO: 8.7 K/UL (ref 4.3–11.1)
WBC # BLD AUTO: 8.7 K/UL (ref 4.3–11.1)
WBC # BLD AUTO: 9.1 K/UL (ref 4.3–11.1)
WBC # BLD AUTO: 9.2 K/UL (ref 4.3–11.1)
WBC # BLD AUTO: 9.2 K/UL (ref 4.3–11.1)
WBC MORPH BLD: ABNORMAL
WBC URNS QL MICRO: ABNORMAL /HPF
WBC URNS QL MICRO: ABNORMAL /HPF

## 2017-01-01 PROCEDURE — 99218 HC RM OBSERVATION: CPT

## 2017-01-01 PROCEDURE — 74011250636 HC RX REV CODE- 250/636: Performed by: INTERNAL MEDICINE

## 2017-01-01 PROCEDURE — 85018 HEMOGLOBIN: CPT | Performed by: ANESTHESIOLOGY

## 2017-01-01 PROCEDURE — 74011250636 HC RX REV CODE- 250/636

## 2017-01-01 PROCEDURE — 77030019908 HC STETH ESOPH SIMS -A: Performed by: ANESTHESIOLOGY

## 2017-01-01 PROCEDURE — 74011250637 HC RX REV CODE- 250/637: Performed by: INTERNAL MEDICINE

## 2017-01-01 PROCEDURE — 74011250637 HC RX REV CODE- 250/637: Performed by: COLON & RECTAL SURGERY

## 2017-01-01 PROCEDURE — 77030008467 HC STPLR SKN COVD -B: Performed by: COLON & RECTAL SURGERY

## 2017-01-01 PROCEDURE — 82962 GLUCOSE BLOOD TEST: CPT

## 2017-01-01 PROCEDURE — 74011000250 HC RX REV CODE- 250: Performed by: HOSPITALIST

## 2017-01-01 PROCEDURE — 36415 COLL VENOUS BLD VENIPUNCTURE: CPT | Performed by: INTERNAL MEDICINE

## 2017-01-01 PROCEDURE — 74011000258 HC RX REV CODE- 258: Performed by: HOSPITALIST

## 2017-01-01 PROCEDURE — 97161 PT EVAL LOW COMPLEX 20 MIN: CPT

## 2017-01-01 PROCEDURE — 77030019940 HC BLNKT HYPOTHRM STRY -B: Performed by: ANESTHESIOLOGY

## 2017-01-01 PROCEDURE — 97530 THERAPEUTIC ACTIVITIES: CPT

## 2017-01-01 PROCEDURE — 74011250636 HC RX REV CODE- 250/636: Performed by: COLON & RECTAL SURGERY

## 2017-01-01 PROCEDURE — 86900 BLOOD TYPING SEROLOGIC ABO: CPT | Performed by: COLON & RECTAL SURGERY

## 2017-01-01 PROCEDURE — 77030002996 HC SUT SLK J&J -A: Performed by: COLON & RECTAL SURGERY

## 2017-01-01 PROCEDURE — 74011000258 HC RX REV CODE- 258: Performed by: INTERNAL MEDICINE

## 2017-01-01 PROCEDURE — 87205 SMEAR GRAM STAIN: CPT | Performed by: COLON & RECTAL SURGERY

## 2017-01-01 PROCEDURE — 74011250637 HC RX REV CODE- 250/637: Performed by: PHYSICIAN ASSISTANT

## 2017-01-01 PROCEDURE — 36600 WITHDRAWAL OF ARTERIAL BLOOD: CPT

## 2017-01-01 PROCEDURE — 77030016151 HC PROTCTR LNS DFOG COVD -B: Performed by: COLON & RECTAL SURGERY

## 2017-01-01 PROCEDURE — 74011636637 HC RX REV CODE- 636/637: Performed by: PHYSICIAN ASSISTANT

## 2017-01-01 PROCEDURE — 36415 COLL VENOUS BLD VENIPUNCTURE: CPT | Performed by: COLON & RECTAL SURGERY

## 2017-01-01 PROCEDURE — 76010000133 HC OR TIME 3 TO 3.5 HR: Performed by: COLON & RECTAL SURGERY

## 2017-01-01 PROCEDURE — 77030013567 HC DRN WND RESERV BARD -A: Performed by: COLON & RECTAL SURGERY

## 2017-01-01 PROCEDURE — 82565 ASSAY OF CREATININE: CPT | Performed by: INTERNAL MEDICINE

## 2017-01-01 PROCEDURE — 77030010507 HC ADH SKN DERMBND J&J -B: Performed by: COLON & RECTAL SURGERY

## 2017-01-01 PROCEDURE — 87077 CULTURE AEROBIC IDENTIFY: CPT | Performed by: COLON & RECTAL SURGERY

## 2017-01-01 PROCEDURE — 51798 US URINE CAPACITY MEASURE: CPT

## 2017-01-01 PROCEDURE — 71275 CT ANGIOGRAPHY CHEST: CPT

## 2017-01-01 PROCEDURE — 77030002933 HC SUT MCRYL J&J -A: Performed by: COLON & RECTAL SURGERY

## 2017-01-01 PROCEDURE — 74011250637 HC RX REV CODE- 250/637: Performed by: ANESTHESIOLOGY

## 2017-01-01 PROCEDURE — 77030008756 HC TU IRR SUC STRY -B: Performed by: COLON & RECTAL SURGERY

## 2017-01-01 PROCEDURE — 82607 VITAMIN B-12: CPT | Performed by: INTERNAL MEDICINE

## 2017-01-01 PROCEDURE — 77030011640 HC PAD GRND REM COVD -A: Performed by: COLON & RECTAL SURGERY

## 2017-01-01 PROCEDURE — 85025 COMPLETE CBC W/AUTO DIFF WBC: CPT | Performed by: INTERNAL MEDICINE

## 2017-01-01 PROCEDURE — 77030031139 HC SUT VCRL2 J&J -A: Performed by: COLON & RECTAL SURGERY

## 2017-01-01 PROCEDURE — 77030008612 HC TRCR ENDOSC VRS COVD -C: Performed by: COLON & RECTAL SURGERY

## 2017-01-01 PROCEDURE — 70450 CT HEAD/BRAIN W/O DYE: CPT

## 2017-01-01 PROCEDURE — 71010 XR CHEST PORT: CPT

## 2017-01-01 PROCEDURE — 65660000000 HC RM CCU STEPDOWN

## 2017-01-01 PROCEDURE — 97535 SELF CARE MNGMENT TRAINING: CPT

## 2017-01-01 PROCEDURE — 74011000250 HC RX REV CODE- 250: Performed by: INTERNAL MEDICINE

## 2017-01-01 PROCEDURE — 80202 ASSAY OF VANCOMYCIN: CPT | Performed by: INTERNAL MEDICINE

## 2017-01-01 PROCEDURE — 87040 BLOOD CULTURE FOR BACTERIA: CPT | Performed by: PHYSICIAN ASSISTANT

## 2017-01-01 PROCEDURE — 87804 INFLUENZA ASSAY W/OPTIC: CPT | Performed by: NURSE PRACTITIONER

## 2017-01-01 PROCEDURE — 82803 BLOOD GASES ANY COMBINATION: CPT

## 2017-01-01 PROCEDURE — 84443 ASSAY THYROID STIM HORMONE: CPT | Performed by: INTERNAL MEDICINE

## 2017-01-01 PROCEDURE — 84100 ASSAY OF PHOSPHORUS: CPT | Performed by: INTERNAL MEDICINE

## 2017-01-01 PROCEDURE — 93005 ELECTROCARDIOGRAM TRACING: CPT | Performed by: INTERNAL MEDICINE

## 2017-01-01 PROCEDURE — 87040 BLOOD CULTURE FOR BACTERIA: CPT | Performed by: HOSPITALIST

## 2017-01-01 PROCEDURE — 80048 BASIC METABOLIC PNL TOTAL CA: CPT | Performed by: INTERNAL MEDICINE

## 2017-01-01 PROCEDURE — 99153 MOD SED SAME PHYS/QHP EA: CPT

## 2017-01-01 PROCEDURE — 87040 BLOOD CULTURE FOR BACTERIA: CPT | Performed by: INTERNAL MEDICINE

## 2017-01-01 PROCEDURE — 77030032490 HC SLV COMPR SCD KNE COVD -B: Performed by: COLON & RECTAL SURGERY

## 2017-01-01 PROCEDURE — 80053 COMPREHEN METABOLIC PANEL: CPT | Performed by: INTERNAL MEDICINE

## 2017-01-01 PROCEDURE — 84484 ASSAY OF TROPONIN QUANT: CPT

## 2017-01-01 PROCEDURE — 96376 TX/PRO/DX INJ SAME DRUG ADON: CPT | Performed by: EMERGENCY MEDICINE

## 2017-01-01 PROCEDURE — 84484 ASSAY OF TROPONIN QUANT: CPT | Performed by: PHYSICIAN ASSISTANT

## 2017-01-01 PROCEDURE — 87075 CULTR BACTERIA EXCEPT BLOOD: CPT | Performed by: COLON & RECTAL SURGERY

## 2017-01-01 PROCEDURE — 94760 N-INVAS EAR/PLS OXIMETRY 1: CPT

## 2017-01-01 PROCEDURE — 77030011943

## 2017-01-01 PROCEDURE — A9537 TC99M MEBROFENIN: HCPCS

## 2017-01-01 PROCEDURE — 65270000029 HC RM PRIVATE

## 2017-01-01 PROCEDURE — 93312 ECHO TRANSESOPHAGEAL: CPT

## 2017-01-01 PROCEDURE — 83735 ASSAY OF MAGNESIUM: CPT | Performed by: INTERNAL MEDICINE

## 2017-01-01 PROCEDURE — 85025 COMPLETE CBC W/AUTO DIFF WBC: CPT | Performed by: COLON & RECTAL SURGERY

## 2017-01-01 PROCEDURE — 86160 COMPLEMENT ANTIGEN: CPT | Performed by: INTERNAL MEDICINE

## 2017-01-01 PROCEDURE — 80048 BASIC METABOLIC PNL TOTAL CA: CPT | Performed by: COLON & RECTAL SURGERY

## 2017-01-01 PROCEDURE — 76060000037 HC ANESTHESIA 3 TO 3.5 HR: Performed by: COLON & RECTAL SURGERY

## 2017-01-01 PROCEDURE — 92960 CARDIOVERSION ELECTRIC EXT: CPT

## 2017-01-01 PROCEDURE — 81001 URINALYSIS AUTO W/SCOPE: CPT | Performed by: PHYSICIAN ASSISTANT

## 2017-01-01 PROCEDURE — 99285 EMERGENCY DEPT VISIT HI MDM: CPT | Performed by: EMERGENCY MEDICINE

## 2017-01-01 PROCEDURE — 77030000038 HC TIP SCIS LAPSCP SURI -B: Performed by: COLON & RECTAL SURGERY

## 2017-01-01 PROCEDURE — 87186 SC STD MICRODIL/AGAR DIL: CPT | Performed by: COLON & RECTAL SURGERY

## 2017-01-01 PROCEDURE — 76705 ECHO EXAM OF ABDOMEN: CPT

## 2017-01-01 PROCEDURE — 85027 COMPLETE CBC AUTOMATED: CPT | Performed by: PHYSICIAN ASSISTANT

## 2017-01-01 PROCEDURE — 80053 COMPREHEN METABOLIC PANEL: CPT | Performed by: COLON & RECTAL SURGERY

## 2017-01-01 PROCEDURE — 74011250636 HC RX REV CODE- 250/636: Performed by: ANESTHESIOLOGY

## 2017-01-01 PROCEDURE — 74011250636 HC RX REV CODE- 250/636: Performed by: HOSPITALIST

## 2017-01-01 PROCEDURE — 97110 THERAPEUTIC EXERCISES: CPT

## 2017-01-01 PROCEDURE — 92610 EVALUATE SWALLOWING FUNCTION: CPT

## 2017-01-01 PROCEDURE — 83690 ASSAY OF LIPASE: CPT | Performed by: INTERNAL MEDICINE

## 2017-01-01 PROCEDURE — 73030 X-RAY EXAM OF SHOULDER: CPT

## 2017-01-01 PROCEDURE — 74011250636 HC RX REV CODE- 250/636: Performed by: SURGERY

## 2017-01-01 PROCEDURE — 77030035048 HC TRCR ENDOSC OPTCL COVD -B: Performed by: COLON & RECTAL SURGERY

## 2017-01-01 PROCEDURE — 74011000250 HC RX REV CODE- 250: Performed by: COLON & RECTAL SURGERY

## 2017-01-01 PROCEDURE — 86580 TB INTRADERMAL TEST: CPT | Performed by: INTERNAL MEDICINE

## 2017-01-01 PROCEDURE — 74011000258 HC RX REV CODE- 258: Performed by: EMERGENCY MEDICINE

## 2017-01-01 PROCEDURE — 93005 ELECTROCARDIOGRAM TRACING: CPT | Performed by: EMERGENCY MEDICINE

## 2017-01-01 PROCEDURE — 74011250637 HC RX REV CODE- 250/637: Performed by: NURSE PRACTITIONER

## 2017-01-01 PROCEDURE — 74011250636 HC RX REV CODE- 250/636: Performed by: EMERGENCY MEDICINE

## 2017-01-01 PROCEDURE — 85025 COMPLETE CBC W/AUTO DIFF WBC: CPT | Performed by: EMERGENCY MEDICINE

## 2017-01-01 PROCEDURE — 70551 MRI BRAIN STEM W/O DYE: CPT

## 2017-01-01 PROCEDURE — 85027 COMPLETE CBC AUTOMATED: CPT | Performed by: COLON & RECTAL SURGERY

## 2017-01-01 PROCEDURE — 74011250636 HC RX REV CODE- 250/636: Performed by: PHYSICIAN ASSISTANT

## 2017-01-01 PROCEDURE — 74011636320 HC RX REV CODE- 636/320: Performed by: EMERGENCY MEDICINE

## 2017-01-01 PROCEDURE — C8929 TTE W OR WO FOL WCON,DOPPLER: HCPCS

## 2017-01-01 PROCEDURE — 74011000250 HC RX REV CODE- 250

## 2017-01-01 PROCEDURE — 81001 URINALYSIS AUTO W/SCOPE: CPT | Performed by: INTERNAL MEDICINE

## 2017-01-01 PROCEDURE — 74011250637 HC RX REV CODE- 250/637

## 2017-01-01 PROCEDURE — 77030012022 HC APPL CLP ENDOSC COVD -C: Performed by: COLON & RECTAL SURGERY

## 2017-01-01 PROCEDURE — 77030009403 HC ELECTRD ENDO MEGA -B: Performed by: COLON & RECTAL SURGERY

## 2017-01-01 PROCEDURE — 74011000302 HC RX REV CODE- 302: Performed by: INTERNAL MEDICINE

## 2017-01-01 PROCEDURE — 71010 XR CHEST SNGL V: CPT

## 2017-01-01 PROCEDURE — 36415 COLL VENOUS BLD VENIPUNCTURE: CPT | Performed by: PHYSICIAN ASSISTANT

## 2017-01-01 PROCEDURE — 83605 ASSAY OF LACTIC ACID: CPT | Performed by: INTERNAL MEDICINE

## 2017-01-01 PROCEDURE — 81003 URINALYSIS AUTO W/O SCOPE: CPT | Performed by: INTERNAL MEDICINE

## 2017-01-01 PROCEDURE — 77030008703 HC TU ET UNCUF COVD -A: Performed by: ANESTHESIOLOGY

## 2017-01-01 PROCEDURE — 77030018836 HC SOL IRR NACL ICUM -A: Performed by: COLON & RECTAL SURGERY

## 2017-01-01 PROCEDURE — 76210000000 HC OR PH I REC 2 TO 2.5 HR: Performed by: COLON & RECTAL SURGERY

## 2017-01-01 PROCEDURE — 93880 EXTRACRANIAL BILAT STUDY: CPT

## 2017-01-01 PROCEDURE — 77030034850: Performed by: COLON & RECTAL SURGERY

## 2017-01-01 PROCEDURE — 82803 BLOOD GASES ANY COMBINATION: CPT | Performed by: INTERNAL MEDICINE

## 2017-01-01 PROCEDURE — 88304 TISSUE EXAM BY PATHOLOGIST: CPT | Performed by: COLON & RECTAL SURGERY

## 2017-01-01 PROCEDURE — 96375 TX/PRO/DX INJ NEW DRUG ADDON: CPT | Performed by: EMERGENCY MEDICINE

## 2017-01-01 PROCEDURE — 77030035051: Performed by: COLON & RECTAL SURGERY

## 2017-01-01 PROCEDURE — 80076 HEPATIC FUNCTION PANEL: CPT | Performed by: INTERNAL MEDICINE

## 2017-01-01 PROCEDURE — 76450000000

## 2017-01-01 PROCEDURE — 74011250637 HC RX REV CODE- 250/637: Performed by: FAMILY MEDICINE

## 2017-01-01 PROCEDURE — 77030009851 HC PCH RTVR ENDOSC AMR -B: Performed by: COLON & RECTAL SURGERY

## 2017-01-01 PROCEDURE — 84132 ASSAY OF SERUM POTASSIUM: CPT | Performed by: PHYSICIAN ASSISTANT

## 2017-01-01 PROCEDURE — 77030012407 HC DRN WND BARD -B: Performed by: COLON & RECTAL SURGERY

## 2017-01-01 PROCEDURE — 87086 URINE CULTURE/COLONY COUNT: CPT | Performed by: PHYSICIAN ASSISTANT

## 2017-01-01 PROCEDURE — 84145 PROCALCITONIN (PCT): CPT | Performed by: INTERNAL MEDICINE

## 2017-01-01 PROCEDURE — 80053 COMPREHEN METABOLIC PANEL: CPT | Performed by: EMERGENCY MEDICINE

## 2017-01-01 PROCEDURE — 74176 CT ABD & PELVIS W/O CONTRAST: CPT

## 2017-01-01 PROCEDURE — 74011636320 HC RX REV CODE- 636/320: Performed by: INTERNAL MEDICINE

## 2017-01-01 PROCEDURE — 0FT40ZZ RESECTION OF GALLBLADDER, OPEN APPROACH: ICD-10-PCS | Performed by: COLON & RECTAL SURGERY

## 2017-01-01 PROCEDURE — 82947 ASSAY GLUCOSE BLOOD QUANT: CPT

## 2017-01-01 PROCEDURE — 80061 LIPID PANEL: CPT | Performed by: PHYSICIAN ASSISTANT

## 2017-01-01 PROCEDURE — 74011250637 HC RX REV CODE- 250/637: Performed by: EMERGENCY MEDICINE

## 2017-01-01 PROCEDURE — 77030011264 HC ELECTRD BLD EXT COVD -A: Performed by: COLON & RECTAL SURGERY

## 2017-01-01 PROCEDURE — 77030008477 HC STYL SATN SLP COVD -A: Performed by: ANESTHESIOLOGY

## 2017-01-01 PROCEDURE — 96374 THER/PROPH/DIAG INJ IV PUSH: CPT | Performed by: EMERGENCY MEDICINE

## 2017-01-01 PROCEDURE — 77030034849

## 2017-01-01 PROCEDURE — 0T9B70Z DRAINAGE OF BLADDER WITH DRAINAGE DEVICE, VIA NATURAL OR ARTIFICIAL OPENING: ICD-10-PCS | Performed by: COLON & RECTAL SURGERY

## 2017-01-01 PROCEDURE — 85018 HEMOGLOBIN: CPT | Performed by: COLON & RECTAL SURGERY

## 2017-01-01 PROCEDURE — 97166 OT EVAL MOD COMPLEX 45 MIN: CPT

## 2017-01-01 PROCEDURE — 82746 ASSAY OF FOLIC ACID SERUM: CPT | Performed by: INTERNAL MEDICINE

## 2017-01-01 PROCEDURE — 99152 MOD SED SAME PHYS/QHP 5/>YRS: CPT

## 2017-01-01 PROCEDURE — 77030002966 HC SUT PDS J&J -A: Performed by: COLON & RECTAL SURGERY

## 2017-01-01 PROCEDURE — 80048 BASIC METABOLIC PNL TOTAL CA: CPT | Performed by: PHYSICIAN ASSISTANT

## 2017-01-01 PROCEDURE — 77030008518 HC TBNG INSUF ENDO STRY -B: Performed by: COLON & RECTAL SURGERY

## 2017-01-01 PROCEDURE — 77030008771 HC TU NG SALEM SUMP -A: Performed by: ANESTHESIOLOGY

## 2017-01-01 PROCEDURE — 77010033678 HC OXYGEN DAILY

## 2017-01-01 RX ORDER — ACETAMINOPHEN 500 MG
2 TABLET ORAL
COMMUNITY
End: 2017-01-01

## 2017-01-01 RX ORDER — SODIUM CHLORIDE 0.9 % (FLUSH) 0.9 %
10 SYRINGE (ML) INJECTION
Status: COMPLETED | OUTPATIENT
Start: 2017-01-01 | End: 2017-01-01

## 2017-01-01 RX ORDER — FENTANYL CITRATE 50 UG/ML
100 INJECTION, SOLUTION INTRAMUSCULAR; INTRAVENOUS AS NEEDED
Status: DISCONTINUED | OUTPATIENT
Start: 2017-01-01 | End: 2017-01-01 | Stop reason: HOSPADM

## 2017-01-01 RX ORDER — OSELTAMIVIR PHOSPHATE 30 MG/1
30 CAPSULE ORAL EVERY 12 HOURS
Status: DISCONTINUED | OUTPATIENT
Start: 2017-01-01 | End: 2017-01-01

## 2017-01-01 RX ORDER — FAMOTIDINE 10 MG/ML
20 INJECTION INTRAVENOUS ONCE
Status: DISCONTINUED | OUTPATIENT
Start: 2017-01-01 | End: 2017-01-01 | Stop reason: HOSPADM

## 2017-01-01 RX ORDER — SODIUM CHLORIDE 0.9 % (FLUSH) 0.9 %
5-10 SYRINGE (ML) INJECTION AS NEEDED
Status: DISCONTINUED | OUTPATIENT
Start: 2017-01-01 | End: 2017-01-01 | Stop reason: HOSPADM

## 2017-01-01 RX ORDER — TRAMADOL HYDROCHLORIDE 50 MG/1
50 TABLET ORAL
Status: DISCONTINUED | OUTPATIENT
Start: 2017-01-01 | End: 2017-01-01

## 2017-01-01 RX ORDER — PROMETHAZINE HYDROCHLORIDE 25 MG/1
25 TABLET ORAL
Status: DISCONTINUED | OUTPATIENT
Start: 2017-01-01 | End: 2017-01-01

## 2017-01-01 RX ORDER — ATORVASTATIN CALCIUM 40 MG/1
40 TABLET, FILM COATED ORAL
Status: DISCONTINUED | OUTPATIENT
Start: 2017-01-01 | End: 2017-01-01

## 2017-01-01 RX ORDER — POLYETHYLENE GLYCOL 3350 17 G/17G
17 POWDER, FOR SOLUTION ORAL EVERY OTHER DAY
COMMUNITY
End: 2017-01-01

## 2017-01-01 RX ORDER — MORPHINE SULFATE 10 MG/ML
2 INJECTION, SOLUTION INTRAMUSCULAR; INTRAVENOUS
Status: DISCONTINUED | OUTPATIENT
Start: 2017-01-01 | End: 2017-01-01 | Stop reason: HOSPADM

## 2017-01-01 RX ORDER — MORPHINE SULFATE 2 MG/ML
2 INJECTION, SOLUTION INTRAMUSCULAR; INTRAVENOUS
Status: DISCONTINUED | OUTPATIENT
Start: 2017-01-01 | End: 2017-01-01

## 2017-01-01 RX ORDER — HEPARIN SODIUM 5000 [USP'U]/ML
5000 INJECTION, SOLUTION INTRAVENOUS; SUBCUTANEOUS EVERY 8 HOURS
Status: DISCONTINUED | OUTPATIENT
Start: 2017-01-01 | End: 2017-01-01

## 2017-01-01 RX ORDER — ONDANSETRON 2 MG/ML
4 INJECTION INTRAMUSCULAR; INTRAVENOUS
Status: DISCONTINUED | OUTPATIENT
Start: 2017-01-01 | End: 2017-01-01 | Stop reason: HOSPADM

## 2017-01-01 RX ORDER — ACETAMINOPHEN 650 MG/1
650 SUPPOSITORY RECTAL
Status: DISCONTINUED | OUTPATIENT
Start: 2017-01-01 | End: 2017-01-01

## 2017-01-01 RX ORDER — SODIUM POLYSTYRENE SULFONATE 15 G/60ML
1 SUSPENSION ORAL; RECTAL
Status: COMPLETED | OUTPATIENT
Start: 2017-01-01 | End: 2017-01-01

## 2017-01-01 RX ORDER — OXYCODONE HYDROCHLORIDE 5 MG/1
5 TABLET ORAL
Status: DISCONTINUED | OUTPATIENT
Start: 2017-01-01 | End: 2017-01-01

## 2017-01-01 RX ORDER — PANTOPRAZOLE SODIUM 40 MG/1
40 TABLET, DELAYED RELEASE ORAL
Status: DISCONTINUED | OUTPATIENT
Start: 2017-01-01 | End: 2017-01-01

## 2017-01-01 RX ORDER — HALOPERIDOL 5 MG/ML
2 INJECTION INTRAMUSCULAR
Status: DISCONTINUED | OUTPATIENT
Start: 2017-01-01 | End: 2017-01-01 | Stop reason: HOSPADM

## 2017-01-01 RX ORDER — OSELTAMIVIR PHOSPHATE 30 MG/1
30 CAPSULE ORAL DAILY
Qty: 3 CAP | Refills: 0 | Status: SHIPPED | OUTPATIENT
Start: 2017-01-01 | End: 2017-01-01

## 2017-01-01 RX ORDER — LABETALOL HYDROCHLORIDE 5 MG/ML
INJECTION, SOLUTION INTRAVENOUS AS NEEDED
Status: DISCONTINUED | OUTPATIENT
Start: 2017-01-01 | End: 2017-01-01 | Stop reason: HOSPADM

## 2017-01-01 RX ORDER — GLYCOPYRROLATE 0.2 MG/ML
0.4 INJECTION INTRAMUSCULAR; INTRAVENOUS
Status: DISCONTINUED | OUTPATIENT
Start: 2017-01-01 | End: 2017-01-01 | Stop reason: HOSPADM

## 2017-01-01 RX ORDER — POTASSIUM CHLORIDE 14.9 MG/ML
20 INJECTION INTRAVENOUS
Status: COMPLETED | OUTPATIENT
Start: 2017-01-01 | End: 2017-01-01

## 2017-01-01 RX ORDER — SODIUM CHLORIDE 0.9 % (FLUSH) 0.9 %
5-10 SYRINGE (ML) INJECTION EVERY 8 HOURS
Status: CANCELLED | OUTPATIENT
Start: 2017-01-01

## 2017-01-01 RX ORDER — MORPHINE SULFATE 2 MG/ML
2 INJECTION, SOLUTION INTRAMUSCULAR; INTRAVENOUS
Status: COMPLETED | OUTPATIENT
Start: 2017-01-01 | End: 2017-01-01

## 2017-01-01 RX ORDER — BUPIVACAINE HYDROCHLORIDE AND EPINEPHRINE 2.5; 5 MG/ML; UG/ML
INJECTION, SOLUTION EPIDURAL; INFILTRATION; INTRACAUDAL; PERINEURAL AS NEEDED
Status: DISCONTINUED | OUTPATIENT
Start: 2017-01-01 | End: 2017-01-01 | Stop reason: HOSPADM

## 2017-01-01 RX ORDER — METOPROLOL TARTRATE 5 MG/5ML
5 INJECTION INTRAVENOUS
Status: DISCONTINUED | OUTPATIENT
Start: 2017-01-01 | End: 2017-01-01 | Stop reason: HOSPADM

## 2017-01-01 RX ORDER — ROCURONIUM BROMIDE 10 MG/ML
INJECTION, SOLUTION INTRAVENOUS AS NEEDED
Status: DISCONTINUED | OUTPATIENT
Start: 2017-01-01 | End: 2017-01-01 | Stop reason: HOSPADM

## 2017-01-01 RX ORDER — ACETAMINOPHEN 325 MG/1
650 TABLET ORAL
Status: DISCONTINUED | OUTPATIENT
Start: 2017-01-01 | End: 2017-01-01

## 2017-01-01 RX ORDER — LIDOCAINE HYDROCHLORIDE 10 MG/ML
0.1 INJECTION INFILTRATION; PERINEURAL AS NEEDED
Status: DISCONTINUED | OUTPATIENT
Start: 2017-01-01 | End: 2017-01-01 | Stop reason: HOSPADM

## 2017-01-01 RX ORDER — MIDODRINE HYDROCHLORIDE 5 MG/1
5 TABLET ORAL 3 TIMES DAILY
COMMUNITY
End: 2017-01-01

## 2017-01-01 RX ORDER — ONDANSETRON 4 MG/1
4 TABLET, ORALLY DISINTEGRATING ORAL
Qty: 20 TAB | Refills: 0 | Status: SHIPPED | OUTPATIENT
Start: 2017-01-01

## 2017-01-01 RX ORDER — FAMOTIDINE 20 MG/1
20 TABLET, FILM COATED ORAL ONCE
Status: COMPLETED | OUTPATIENT
Start: 2017-01-01 | End: 2017-01-01

## 2017-01-01 RX ORDER — CALCIUM CARBONATE 200(500)MG
400 TABLET,CHEWABLE ORAL
Status: DISCONTINUED | OUTPATIENT
Start: 2017-01-01 | End: 2017-01-01

## 2017-01-01 RX ORDER — TAMSULOSIN HYDROCHLORIDE 0.4 MG/1
0.4 CAPSULE ORAL DAILY
Status: DISCONTINUED | OUTPATIENT
Start: 2017-01-01 | End: 2017-01-01

## 2017-01-01 RX ORDER — ACETAMINOPHEN 500 MG
500 TABLET ORAL ONCE
Status: DISCONTINUED | OUTPATIENT
Start: 2017-01-01 | End: 2017-01-01 | Stop reason: HOSPADM

## 2017-01-01 RX ORDER — DOCUSATE SODIUM 100 MG/1
100 CAPSULE, LIQUID FILLED ORAL 2 TIMES DAILY
Status: DISCONTINUED | OUTPATIENT
Start: 2017-01-01 | End: 2017-01-01

## 2017-01-01 RX ORDER — ALPRAZOLAM 0.5 MG/1
1 TABLET ORAL
Status: DISCONTINUED | OUTPATIENT
Start: 2017-01-01 | End: 2017-01-01

## 2017-01-01 RX ORDER — NEOSTIGMINE METHYLSULFATE 1 MG/ML
INJECTION INTRAVENOUS AS NEEDED
Status: DISCONTINUED | OUTPATIENT
Start: 2017-01-01 | End: 2017-01-01 | Stop reason: HOSPADM

## 2017-01-01 RX ORDER — HYDROCODONE BITARTRATE AND ACETAMINOPHEN 10; 325 MG/1; MG/1
1 TABLET ORAL
Status: COMPLETED | OUTPATIENT
Start: 2017-01-01 | End: 2017-01-01

## 2017-01-01 RX ORDER — HALOPERIDOL 5 MG/ML
3 INJECTION INTRAMUSCULAR
Status: DISCONTINUED | OUTPATIENT
Start: 2017-01-01 | End: 2017-01-01 | Stop reason: SINTOL

## 2017-01-01 RX ORDER — LEVOFLOXACIN 500 MG/1
500 TABLET, FILM COATED ORAL DAILY
Qty: 6 TAB | Refills: 0 | Status: SHIPPED | OUTPATIENT
Start: 2017-01-01

## 2017-01-01 RX ORDER — MIDAZOLAM HYDROCHLORIDE 1 MG/ML
.5-2 INJECTION, SOLUTION INTRAMUSCULAR; INTRAVENOUS
Status: DISCONTINUED | OUTPATIENT
Start: 2017-01-01 | End: 2017-01-01 | Stop reason: HOSPADM

## 2017-01-01 RX ORDER — OXYCODONE HYDROCHLORIDE 5 MG/1
5 TABLET ORAL
Status: DISCONTINUED | OUTPATIENT
Start: 2017-01-01 | End: 2017-01-01 | Stop reason: HOSPADM

## 2017-01-01 RX ORDER — SODIUM CHLORIDE, SODIUM LACTATE, POTASSIUM CHLORIDE, CALCIUM CHLORIDE 600; 310; 30; 20 MG/100ML; MG/100ML; MG/100ML; MG/100ML
75 INJECTION, SOLUTION INTRAVENOUS CONTINUOUS
Status: DISCONTINUED | OUTPATIENT
Start: 2017-01-01 | End: 2017-01-01 | Stop reason: HOSPADM

## 2017-01-01 RX ORDER — POTASSIUM CHLORIDE 14.9 MG/ML
20 INJECTION INTRAVENOUS
Status: DISCONTINUED | OUTPATIENT
Start: 2017-01-01 | End: 2017-01-01

## 2017-01-01 RX ORDER — LANOLIN ALCOHOL/MO/W.PET/CERES
400 CREAM (GRAM) TOPICAL DAILY
Status: DISCONTINUED | OUTPATIENT
Start: 2017-01-01 | End: 2017-01-01

## 2017-01-01 RX ORDER — HYDROCODONE BITARTRATE AND ACETAMINOPHEN 5; 325 MG/1; MG/1
1 TABLET ORAL
Status: DISCONTINUED | OUTPATIENT
Start: 2017-01-01 | End: 2017-01-01

## 2017-01-01 RX ORDER — SODIUM CHLORIDE 9 MG/ML
125 INJECTION, SOLUTION INTRAVENOUS CONTINUOUS
Status: DISCONTINUED | OUTPATIENT
Start: 2017-01-01 | End: 2017-01-01

## 2017-01-01 RX ORDER — MIDODRINE HYDROCHLORIDE 5 MG/1
5 TABLET ORAL 3 TIMES DAILY
Status: DISCONTINUED | OUTPATIENT
Start: 2017-01-01 | End: 2017-01-01

## 2017-01-01 RX ORDER — HYDRALAZINE HYDROCHLORIDE 20 MG/ML
20 INJECTION INTRAMUSCULAR; INTRAVENOUS
Status: COMPLETED | OUTPATIENT
Start: 2017-01-01 | End: 2017-01-01

## 2017-01-01 RX ORDER — HYDRALAZINE HYDROCHLORIDE 20 MG/ML
10 INJECTION INTRAMUSCULAR; INTRAVENOUS
Status: DISCONTINUED | OUTPATIENT
Start: 2017-01-01 | End: 2017-01-01 | Stop reason: HOSPADM

## 2017-01-01 RX ORDER — CHOLECALCIFEROL TAB 125 MCG (5000 UNIT) 125 MCG
5000 TAB ORAL DAILY
COMMUNITY
End: 2017-01-01

## 2017-01-01 RX ORDER — OSELTAMIVIR PHOSPHATE 30 MG/1
30 CAPSULE ORAL DAILY
Status: DISCONTINUED | OUTPATIENT
Start: 2017-01-01 | End: 2017-01-01

## 2017-01-01 RX ORDER — VANCOMYCIN HYDROCHLORIDE
1250 EVERY 24 HOURS
Status: DISCONTINUED | OUTPATIENT
Start: 2017-01-01 | End: 2017-01-01

## 2017-01-01 RX ORDER — ONDANSETRON 2 MG/ML
4 INJECTION INTRAMUSCULAR; INTRAVENOUS
Status: COMPLETED | OUTPATIENT
Start: 2017-01-01 | End: 2017-01-01

## 2017-01-01 RX ORDER — CHLORPROMAZINE HYDROCHLORIDE 25 MG/ML
25 INJECTION INTRAMUSCULAR
Status: DISCONTINUED | OUTPATIENT
Start: 2017-01-01 | End: 2017-01-01 | Stop reason: SDUPTHER

## 2017-01-01 RX ORDER — HYDROMORPHONE HYDROCHLORIDE 2 MG/ML
0.5 INJECTION, SOLUTION INTRAMUSCULAR; INTRAVENOUS; SUBCUTANEOUS
Status: DISCONTINUED | OUTPATIENT
Start: 2017-01-01 | End: 2017-01-01 | Stop reason: HOSPADM

## 2017-01-01 RX ORDER — CHLORPROMAZINE HYDROCHLORIDE 25 MG/ML
25 INJECTION INTRAMUSCULAR
Status: DISCONTINUED | OUTPATIENT
Start: 2017-01-01 | End: 2017-01-01

## 2017-01-01 RX ORDER — NITROGLYCERIN 0.4 MG/1
0.4 TABLET SUBLINGUAL
Status: DISCONTINUED | OUTPATIENT
Start: 2017-01-01 | End: 2017-01-01

## 2017-01-01 RX ORDER — CARBIDOPA AND LEVODOPA 25; 100 MG/1; MG/1
1 TABLET ORAL 3 TIMES DAILY
Status: DISCONTINUED | OUTPATIENT
Start: 2017-01-01 | End: 2017-01-01

## 2017-01-01 RX ORDER — MIDAZOLAM HYDROCHLORIDE 1 MG/ML
2 INJECTION, SOLUTION INTRAMUSCULAR; INTRAVENOUS
Status: DISCONTINUED | OUTPATIENT
Start: 2017-01-01 | End: 2017-01-01 | Stop reason: HOSPADM

## 2017-01-01 RX ORDER — PROPOFOL 10 MG/ML
INJECTION, EMULSION INTRAVENOUS AS NEEDED
Status: DISCONTINUED | OUTPATIENT
Start: 2017-01-01 | End: 2017-01-01 | Stop reason: HOSPADM

## 2017-01-01 RX ORDER — HALOPERIDOL 5 MG/ML
1 INJECTION INTRAMUSCULAR
Status: DISCONTINUED | OUTPATIENT
Start: 2017-01-01 | End: 2017-01-01

## 2017-01-01 RX ORDER — CLOPIDOGREL BISULFATE 75 MG/1
75 TABLET ORAL DAILY
Status: DISCONTINUED | OUTPATIENT
Start: 2017-01-01 | End: 2017-01-01

## 2017-01-01 RX ORDER — DIPHENHYDRAMINE HYDROCHLORIDE 50 MG/ML
12.5 INJECTION, SOLUTION INTRAMUSCULAR; INTRAVENOUS ONCE
Status: DISCONTINUED | OUTPATIENT
Start: 2017-01-01 | End: 2017-01-01 | Stop reason: HOSPADM

## 2017-01-01 RX ORDER — MORPHINE SULFATE 2 MG/ML
4 INJECTION, SOLUTION INTRAMUSCULAR; INTRAVENOUS
Status: COMPLETED | OUTPATIENT
Start: 2017-01-01 | End: 2017-01-01

## 2017-01-01 RX ORDER — OXYCODONE HYDROCHLORIDE 5 MG/1
10 TABLET ORAL
Status: DISCONTINUED | OUTPATIENT
Start: 2017-01-01 | End: 2017-01-01

## 2017-01-01 RX ORDER — FENTANYL CITRATE 50 UG/ML
25-75 INJECTION, SOLUTION INTRAMUSCULAR; INTRAVENOUS
Status: DISCONTINUED | OUTPATIENT
Start: 2017-01-01 | End: 2017-01-01 | Stop reason: HOSPADM

## 2017-01-01 RX ORDER — MORPHINE SULFATE 2 MG/ML
4 INJECTION, SOLUTION INTRAMUSCULAR; INTRAVENOUS
Status: DISCONTINUED | OUTPATIENT
Start: 2017-01-01 | End: 2017-01-01 | Stop reason: HOSPADM

## 2017-01-01 RX ORDER — MORPHINE SULFATE 10 MG/ML
5 INJECTION, SOLUTION INTRAMUSCULAR; INTRAVENOUS
Status: DISCONTINUED | OUTPATIENT
Start: 2017-01-01 | End: 2017-01-01

## 2017-01-01 RX ORDER — LIDOCAINE HYDROCHLORIDE 20 MG/ML
INJECTION, SOLUTION EPIDURAL; INFILTRATION; INTRACAUDAL; PERINEURAL AS NEEDED
Status: DISCONTINUED | OUTPATIENT
Start: 2017-01-01 | End: 2017-01-01 | Stop reason: HOSPADM

## 2017-01-01 RX ORDER — INSULIN LISPRO 100 [IU]/ML
INJECTION, SOLUTION INTRAVENOUS; SUBCUTANEOUS
Status: DISCONTINUED | OUTPATIENT
Start: 2017-01-01 | End: 2017-01-01

## 2017-01-01 RX ORDER — NALOXONE HYDROCHLORIDE 0.4 MG/ML
0.1 INJECTION, SOLUTION INTRAMUSCULAR; INTRAVENOUS; SUBCUTANEOUS AS NEEDED
Status: DISCONTINUED | OUTPATIENT
Start: 2017-01-01 | End: 2017-01-01 | Stop reason: HOSPADM

## 2017-01-01 RX ORDER — OXYCODONE HYDROCHLORIDE 5 MG/1
10 TABLET ORAL
Status: DISCONTINUED | OUTPATIENT
Start: 2017-01-01 | End: 2017-01-01 | Stop reason: HOSPADM

## 2017-01-01 RX ORDER — THERA TABS 400 MCG
1 TAB ORAL DAILY
COMMUNITY
End: 2017-01-01

## 2017-01-01 RX ORDER — MEMANTINE HYDROCHLORIDE 14 MG/1
28 CAPSULE, EXTENDED RELEASE ORAL DAILY
Status: DISCONTINUED | OUTPATIENT
Start: 2017-01-01 | End: 2017-01-01

## 2017-01-01 RX ORDER — ONDANSETRON 2 MG/ML
INJECTION INTRAMUSCULAR; INTRAVENOUS AS NEEDED
Status: DISCONTINUED | OUTPATIENT
Start: 2017-01-01 | End: 2017-01-01 | Stop reason: HOSPADM

## 2017-01-01 RX ORDER — VANCOMYCIN HYDROCHLORIDE
1250
Status: DISCONTINUED | OUTPATIENT
Start: 2017-01-01 | End: 2017-01-01

## 2017-01-01 RX ORDER — ACETAMINOPHEN 500 MG
500 TABLET ORAL
Status: DISCONTINUED | OUTPATIENT
Start: 2017-01-01 | End: 2017-01-01 | Stop reason: SDUPTHER

## 2017-01-01 RX ORDER — POTASSIUM CHLORIDE 14.9 MG/ML
20 INJECTION INTRAVENOUS
Status: DISCONTINUED | OUTPATIENT
Start: 2017-01-01 | End: 2017-01-01 | Stop reason: SDUPTHER

## 2017-01-01 RX ORDER — SODIUM CHLORIDE, SODIUM LACTATE, POTASSIUM CHLORIDE, CALCIUM CHLORIDE 600; 310; 30; 20 MG/100ML; MG/100ML; MG/100ML; MG/100ML
1000 INJECTION, SOLUTION INTRAVENOUS CONTINUOUS
Status: DISCONTINUED | OUTPATIENT
Start: 2017-01-01 | End: 2017-01-01 | Stop reason: HOSPADM

## 2017-01-01 RX ORDER — OXYCODONE HYDROCHLORIDE 5 MG/1
TABLET ORAL
Qty: 50 TAB | Refills: 0 | Status: SHIPPED | OUTPATIENT
Start: 2017-01-01

## 2017-01-01 RX ORDER — SODIUM CHLORIDE 0.9 % (FLUSH) 0.9 %
5-10 SYRINGE (ML) INJECTION AS NEEDED
Status: CANCELLED | OUTPATIENT
Start: 2017-01-01

## 2017-01-01 RX ORDER — SODIUM CHLORIDE 0.9 % (FLUSH) 0.9 %
5-10 SYRINGE (ML) INJECTION EVERY 8 HOURS
Status: DISCONTINUED | OUTPATIENT
Start: 2017-01-01 | End: 2017-01-01

## 2017-01-01 RX ORDER — ASPIRIN 325 MG
325 TABLET, DELAYED RELEASE (ENTERIC COATED) ORAL DAILY
Status: DISCONTINUED | OUTPATIENT
Start: 2017-01-01 | End: 2017-01-01

## 2017-01-01 RX ORDER — ACETAMINOPHEN 500 MG
1000 TABLET ORAL EVERY 6 HOURS
Status: DISPENSED | OUTPATIENT
Start: 2017-01-01 | End: 2017-01-01

## 2017-01-01 RX ORDER — MORPHINE SULFATE 2 MG/ML
1 INJECTION, SOLUTION INTRAMUSCULAR; INTRAVENOUS
Status: DISCONTINUED | OUTPATIENT
Start: 2017-01-01 | End: 2017-01-01

## 2017-01-01 RX ORDER — FENTANYL CITRATE 50 UG/ML
INJECTION, SOLUTION INTRAMUSCULAR; INTRAVENOUS AS NEEDED
Status: DISCONTINUED | OUTPATIENT
Start: 2017-01-01 | End: 2017-01-01 | Stop reason: HOSPADM

## 2017-01-01 RX ORDER — SENNOSIDES 8.6 MG/1
2 TABLET ORAL DAILY
COMMUNITY
End: 2017-01-01

## 2017-01-01 RX ORDER — NITROGLYCERIN 0.4 MG/1
0.4 TABLET SUBLINGUAL
COMMUNITY
End: 2017-01-01

## 2017-01-01 RX ORDER — GLYCOPYRROLATE 0.2 MG/ML
INJECTION INTRAMUSCULAR; INTRAVENOUS AS NEEDED
Status: DISCONTINUED | OUTPATIENT
Start: 2017-01-01 | End: 2017-01-01 | Stop reason: HOSPADM

## 2017-01-01 RX ORDER — NITROGLYCERIN 0.4 MG/1
0.4 TABLET SUBLINGUAL
Status: DISCONTINUED | OUTPATIENT
Start: 2017-01-01 | End: 2017-01-01 | Stop reason: HOSPADM

## 2017-01-01 RX ORDER — IPRATROPIUM BROMIDE AND ALBUTEROL SULFATE 2.5; .5 MG/3ML; MG/3ML
3 SOLUTION RESPIRATORY (INHALATION)
Status: DISCONTINUED | OUTPATIENT
Start: 2017-01-01 | End: 2017-01-01 | Stop reason: HOSPADM

## 2017-01-01 RX ADMIN — CALCIUM CARBONATE (ANTACID) CHEW TAB 500 MG 400 MG: 500 CHEW TAB at 21:21

## 2017-01-01 RX ADMIN — Medication 2 MG: at 16:36

## 2017-01-01 RX ADMIN — MEROPENEM 500 MG: 1 INJECTION, POWDER, FOR SOLUTION INTRAVENOUS at 17:27

## 2017-01-01 RX ADMIN — SODIUM CHLORIDE 100 ML/HR: 900 INJECTION, SOLUTION INTRAVENOUS at 05:18

## 2017-01-01 RX ADMIN — MIDODRINE HYDROCHLORIDE 5 MG: 5 TABLET ORAL at 09:45

## 2017-01-01 RX ADMIN — VANCOMYCIN HYDROCHLORIDE 1250 MG: 10 INJECTION, POWDER, LYOPHILIZED, FOR SOLUTION INTRAVENOUS at 16:11

## 2017-01-01 RX ADMIN — TAMSULOSIN HYDROCHLORIDE 0.4 MG: 0.4 CAPSULE ORAL at 09:10

## 2017-01-01 RX ADMIN — TAMSULOSIN HYDROCHLORIDE 0.4 MG: 0.4 CAPSULE ORAL at 07:49

## 2017-01-01 RX ADMIN — MEROPENEM 500 MG: 500 INJECTION, POWDER, FOR SOLUTION INTRAVENOUS at 05:14

## 2017-01-01 RX ADMIN — MIDODRINE HYDROCHLORIDE 5 MG: 5 TABLET ORAL at 22:27

## 2017-01-01 RX ADMIN — MIDODRINE HYDROCHLORIDE 5 MG: 5 TABLET ORAL at 15:41

## 2017-01-01 RX ADMIN — DOCUSATE SODIUM 100 MG: 100 CAPSULE, LIQUID FILLED ORAL at 16:42

## 2017-01-01 RX ADMIN — MEROPENEM 500 MG: 1 INJECTION, POWDER, FOR SOLUTION INTRAVENOUS at 11:57

## 2017-01-01 RX ADMIN — ATORVASTATIN CALCIUM 40 MG: 40 TABLET, FILM COATED ORAL at 21:20

## 2017-01-01 RX ADMIN — ACETAMINOPHEN 650 MG: 325 TABLET, FILM COATED ORAL at 10:39

## 2017-01-01 RX ADMIN — CARBIDOPA AND LEVODOPA 1 TABLET: 25; 100 TABLET ORAL at 09:45

## 2017-01-01 RX ADMIN — INSULIN LISPRO 3 UNITS: 100 INJECTION, SOLUTION INTRAVENOUS; SUBCUTANEOUS at 08:32

## 2017-01-01 RX ADMIN — MEROPENEM 500 MG: 1 INJECTION, POWDER, FOR SOLUTION INTRAVENOUS at 23:19

## 2017-01-01 RX ADMIN — HEPARIN SODIUM 5000 UNITS: 5000 INJECTION, SOLUTION INTRAVENOUS; SUBCUTANEOUS at 08:56

## 2017-01-01 RX ADMIN — DOCUSATE SODIUM 100 MG: 100 CAPSULE, LIQUID FILLED ORAL at 08:16

## 2017-01-01 RX ADMIN — ACETAMINOPHEN 650 MG: 325 TABLET, FILM COATED ORAL at 17:27

## 2017-01-01 RX ADMIN — MEROPENEM 500 MG: 1 INJECTION, POWDER, FOR SOLUTION INTRAVENOUS at 08:22

## 2017-01-01 RX ADMIN — CARBIDOPA AND LEVODOPA 1 TABLET: 25; 100 TABLET ORAL at 15:53

## 2017-01-01 RX ADMIN — SODIUM POLYSTYRENE SULFONATE 1 ENEMA: 15 SUSPENSION ORAL; RECTAL at 20:53

## 2017-01-01 RX ADMIN — HEPARIN SODIUM 5000 UNITS: 5000 INJECTION, SOLUTION INTRAVENOUS; SUBCUTANEOUS at 06:40

## 2017-01-01 RX ADMIN — SODIUM CHLORIDE, SODIUM LACTATE, POTASSIUM CHLORIDE, AND CALCIUM CHLORIDE: 600; 310; 30; 20 INJECTION, SOLUTION INTRAVENOUS at 16:42

## 2017-01-01 RX ADMIN — CLOPIDOGREL BISULFATE 75 MG: 75 TABLET, FILM COATED ORAL at 08:55

## 2017-01-01 RX ADMIN — FAMOTIDINE 20 MG: 20 TABLET ORAL at 08:16

## 2017-01-01 RX ADMIN — HEPARIN SODIUM 5000 UNITS: 5000 INJECTION, SOLUTION INTRAVENOUS; SUBCUTANEOUS at 23:29

## 2017-01-01 RX ADMIN — HEPARIN SODIUM 5000 UNITS: 5000 INJECTION, SOLUTION INTRAVENOUS; SUBCUTANEOUS at 08:00

## 2017-01-01 RX ADMIN — POTASSIUM CHLORIDE 20 MEQ: 14.9 INJECTION, SOLUTION INTRAVENOUS at 07:58

## 2017-01-01 RX ADMIN — DOCUSATE SODIUM 100 MG: 100 CAPSULE, LIQUID FILLED ORAL at 08:55

## 2017-01-01 RX ADMIN — OXYCODONE HYDROCHLORIDE 10 MG: 5 TABLET ORAL at 12:05

## 2017-01-01 RX ADMIN — INSULIN LISPRO 6 UNITS: 100 INJECTION, SOLUTION INTRAVENOUS; SUBCUTANEOUS at 12:03

## 2017-01-01 RX ADMIN — MEROPENEM 500 MG: 1 INJECTION, POWDER, FOR SOLUTION INTRAVENOUS at 00:04

## 2017-01-01 RX ADMIN — REGULAR STRENGTH 325 MG: 325 TABLET ORAL at 07:50

## 2017-01-01 RX ADMIN — CARBIDOPA AND LEVODOPA 1 TABLET: 25; 100 TABLET ORAL at 08:17

## 2017-01-01 RX ADMIN — PIPERACILLIN SODIUM,TAZOBACTAM SODIUM 3.38 G: 3; .375 INJECTION, POWDER, FOR SOLUTION INTRAVENOUS at 15:53

## 2017-01-01 RX ADMIN — VANCOMYCIN HYDROCHLORIDE 1250 MG: 10 INJECTION, POWDER, LYOPHILIZED, FOR SOLUTION INTRAVENOUS at 00:22

## 2017-01-01 RX ADMIN — HEPARIN SODIUM 5000 UNITS: 5000 INJECTION, SOLUTION INTRAVENOUS; SUBCUTANEOUS at 05:55

## 2017-01-01 RX ADMIN — HALOPERIDOL LACTATE 2 MG: 5 INJECTION, SOLUTION INTRAMUSCULAR at 13:36

## 2017-01-01 RX ADMIN — SODIUM CHLORIDE 125 ML/HR: 900 INJECTION, SOLUTION INTRAVENOUS at 04:30

## 2017-01-01 RX ADMIN — REGULAR STRENGTH 325 MG: 325 TABLET ORAL at 09:42

## 2017-01-01 RX ADMIN — MEMANTINE HYDROCHLORIDE 28 MG: 14 CAPSULE, EXTENDED RELEASE ORAL at 08:59

## 2017-01-01 RX ADMIN — CARBIDOPA AND LEVODOPA 1 TABLET: 25; 100 TABLET ORAL at 21:19

## 2017-01-01 RX ADMIN — ROCURONIUM BROMIDE 40 MG: 10 INJECTION, SOLUTION INTRAVENOUS at 13:54

## 2017-01-01 RX ADMIN — ALPRAZOLAM 1 MG: 0.5 TABLET ORAL at 23:26

## 2017-01-01 RX ADMIN — PROPOFOL 150 MG: 10 INJECTION, EMULSION INTRAVENOUS at 13:54

## 2017-01-01 RX ADMIN — ACETAMINOPHEN 650 MG: 325 TABLET, FILM COATED ORAL at 20:10

## 2017-01-01 RX ADMIN — MEROPENEM 500 MG: 1 INJECTION, POWDER, FOR SOLUTION INTRAVENOUS at 04:05

## 2017-01-01 RX ADMIN — HEPARIN SODIUM 5000 UNITS: 5000 INJECTION, SOLUTION INTRAVENOUS; SUBCUTANEOUS at 14:32

## 2017-01-01 RX ADMIN — OSELTAMIVIR PHOSPHATE 30 MG: 30 CAPSULE ORAL at 08:55

## 2017-01-01 RX ADMIN — DEXTROSE MONOHYDRATE: 5 INJECTION, SOLUTION INTRAVENOUS at 14:21

## 2017-01-01 RX ADMIN — MIDODRINE HYDROCHLORIDE 5 MG: 5 TABLET ORAL at 17:04

## 2017-01-01 RX ADMIN — MIDODRINE HYDROCHLORIDE 5 MG: 5 TABLET ORAL at 21:20

## 2017-01-01 RX ADMIN — HEPARIN SODIUM 5000 UNITS: 5000 INJECTION, SOLUTION INTRAVENOUS; SUBCUTANEOUS at 23:30

## 2017-01-01 RX ADMIN — ONDANSETRON 4 MG: 2 INJECTION INTRAMUSCULAR; INTRAVENOUS at 09:33

## 2017-01-01 RX ADMIN — VANCOMYCIN HYDROCHLORIDE 1000 MG: 1 INJECTION, POWDER, LYOPHILIZED, FOR SOLUTION INTRAVENOUS at 19:13

## 2017-01-01 RX ADMIN — ONDANSETRON 4 MG: 2 INJECTION INTRAMUSCULAR; INTRAVENOUS at 16:18

## 2017-01-01 RX ADMIN — ALPRAZOLAM 1 MG: 0.5 TABLET ORAL at 19:15

## 2017-01-01 RX ADMIN — DOCUSATE SODIUM 100 MG: 100 CAPSULE, LIQUID FILLED ORAL at 08:41

## 2017-01-01 RX ADMIN — POTASSIUM CHLORIDE 20 MEQ: 14.9 INJECTION, SOLUTION INTRAVENOUS at 13:30

## 2017-01-01 RX ADMIN — NITROGLYCERIN 0.4 MG: 0.4 TABLET SUBLINGUAL at 11:59

## 2017-01-01 RX ADMIN — CARBIDOPA AND LEVODOPA 1 TABLET: 25; 100 TABLET ORAL at 15:41

## 2017-01-01 RX ADMIN — MEMANTINE HYDROCHLORIDE 28 MG: 14 CAPSULE, EXTENDED RELEASE ORAL at 07:54

## 2017-01-01 RX ADMIN — MIDODRINE HYDROCHLORIDE 5 MG: 5 TABLET ORAL at 17:45

## 2017-01-01 RX ADMIN — MIDODRINE HYDROCHLORIDE 5 MG: 5 TABLET ORAL at 08:55

## 2017-01-01 RX ADMIN — SODIUM CHLORIDE 500 ML: 900 INJECTION, SOLUTION INTRAVENOUS at 15:30

## 2017-01-01 RX ADMIN — SODIUM CHLORIDE 75 ML/HR: 900 INJECTION, SOLUTION INTRAVENOUS at 06:32

## 2017-01-01 RX ADMIN — ATORVASTATIN CALCIUM 40 MG: 40 TABLET, FILM COATED ORAL at 22:45

## 2017-01-01 RX ADMIN — VANCOMYCIN HYDROCHLORIDE 1000 MG: 1 INJECTION, POWDER, LYOPHILIZED, FOR SOLUTION INTRAVENOUS at 05:59

## 2017-01-01 RX ADMIN — INSULIN LISPRO 3 UNITS: 100 INJECTION, SOLUTION INTRAVENOUS; SUBCUTANEOUS at 22:09

## 2017-01-01 RX ADMIN — FENTANYL CITRATE 50 MCG: 50 INJECTION, SOLUTION INTRAMUSCULAR; INTRAVENOUS at 14:18

## 2017-01-01 RX ADMIN — INSULIN LISPRO 3 UNITS: 100 INJECTION, SOLUTION INTRAVENOUS; SUBCUTANEOUS at 12:27

## 2017-01-01 RX ADMIN — PIPERACILLIN SODIUM,TAZOBACTAM SODIUM 3.38 G: 3; .375 INJECTION, POWDER, FOR SOLUTION INTRAVENOUS at 08:57

## 2017-01-01 RX ADMIN — ROCURONIUM BROMIDE 10 MG: 10 INJECTION, SOLUTION INTRAVENOUS at 14:54

## 2017-01-01 RX ADMIN — Medication 400 MG: at 08:11

## 2017-01-01 RX ADMIN — CLOPIDOGREL BISULFATE 75 MG: 75 TABLET, FILM COATED ORAL at 08:59

## 2017-01-01 RX ADMIN — NITROGLYCERIN 0.4 MG: 0.4 TABLET SUBLINGUAL at 11:06

## 2017-01-01 RX ADMIN — Medication 400 MG: at 08:17

## 2017-01-01 RX ADMIN — PIPERACILLIN SODIUM,TAZOBACTAM SODIUM 3.38 G: 3; .375 INJECTION, POWDER, FOR SOLUTION INTRAVENOUS at 09:00

## 2017-01-01 RX ADMIN — ONDANSETRON 4 MG: 2 INJECTION INTRAMUSCULAR; INTRAVENOUS at 16:31

## 2017-01-01 RX ADMIN — MEROPENEM 500 MG: 1 INJECTION, POWDER, FOR SOLUTION INTRAVENOUS at 05:24

## 2017-01-01 RX ADMIN — DOCUSATE SODIUM 100 MG: 100 CAPSULE, LIQUID FILLED ORAL at 17:27

## 2017-01-01 RX ADMIN — SODIUM CHLORIDE 500 ML: 900 INJECTION, SOLUTION INTRAVENOUS at 14:00

## 2017-01-01 RX ADMIN — REGULAR STRENGTH 325 MG: 325 TABLET ORAL at 10:32

## 2017-01-01 RX ADMIN — MIDODRINE HYDROCHLORIDE 5 MG: 5 TABLET ORAL at 22:01

## 2017-01-01 RX ADMIN — MEROPENEM 500 MG: 500 INJECTION, POWDER, FOR SOLUTION INTRAVENOUS at 18:30

## 2017-01-01 RX ADMIN — CARBIDOPA AND LEVODOPA 1 TABLET: 25; 100 TABLET ORAL at 21:50

## 2017-01-01 RX ADMIN — INSULIN LISPRO 3 UNITS: 100 INJECTION, SOLUTION INTRAVENOUS; SUBCUTANEOUS at 22:59

## 2017-01-01 RX ADMIN — CARBIDOPA AND LEVODOPA 1 TABLET: 25; 100 TABLET ORAL at 23:25

## 2017-01-01 RX ADMIN — OXYCODONE HYDROCHLORIDE 10 MG: 5 TABLET ORAL at 03:11

## 2017-01-01 RX ADMIN — MORPHINE SULFATE 5 MG: 10 INJECTION INTRAMUSCULAR; INTRAVENOUS; SUBCUTANEOUS at 09:38

## 2017-01-01 RX ADMIN — SODIUM CHLORIDE 100 ML/HR: 900 INJECTION, SOLUTION INTRAVENOUS at 00:24

## 2017-01-01 RX ADMIN — REGULAR STRENGTH 325 MG: 325 TABLET ORAL at 08:40

## 2017-01-01 RX ADMIN — MIDODRINE HYDROCHLORIDE 5 MG: 5 TABLET ORAL at 21:36

## 2017-01-01 RX ADMIN — HYDRALAZINE HYDROCHLORIDE 20 MG: 20 INJECTION INTRAMUSCULAR; INTRAVENOUS at 11:06

## 2017-01-01 RX ADMIN — PIPERACILLIN SODIUM,TAZOBACTAM SODIUM 3.38 G: 3; .375 INJECTION, POWDER, FOR SOLUTION INTRAVENOUS at 15:40

## 2017-01-01 RX ADMIN — Medication 2 MG: at 07:56

## 2017-01-01 RX ADMIN — HEPARIN SODIUM 5000 UNITS: 5000 INJECTION, SOLUTION INTRAVENOUS; SUBCUTANEOUS at 21:25

## 2017-01-01 RX ADMIN — PANTOPRAZOLE SODIUM 40 MG: 40 TABLET, DELAYED RELEASE ORAL at 06:31

## 2017-01-01 RX ADMIN — CARBIDOPA AND LEVODOPA 1 TABLET: 25; 100 TABLET ORAL at 23:24

## 2017-01-01 RX ADMIN — SODIUM CHLORIDE 100 ML: 900 INJECTION, SOLUTION INTRAVENOUS at 10:18

## 2017-01-01 RX ADMIN — VANCOMYCIN HYDROCHLORIDE 1250 MG: 10 INJECTION, POWDER, LYOPHILIZED, FOR SOLUTION INTRAVENOUS at 17:43

## 2017-01-01 RX ADMIN — DOCUSATE SODIUM 100 MG: 100 CAPSULE, LIQUID FILLED ORAL at 08:11

## 2017-01-01 RX ADMIN — HYDROMORPHONE HYDROCHLORIDE 0.5 MG: 2 INJECTION, SOLUTION INTRAMUSCULAR; INTRAVENOUS; SUBCUTANEOUS at 18:02

## 2017-01-01 RX ADMIN — ATORVASTATIN CALCIUM 40 MG: 40 TABLET, FILM COATED ORAL at 22:01

## 2017-01-01 RX ADMIN — ATORVASTATIN CALCIUM 40 MG: 40 TABLET, FILM COATED ORAL at 21:50

## 2017-01-01 RX ADMIN — SODIUM CHLORIDE 100 ML/HR: 900 INJECTION, SOLUTION INTRAVENOUS at 16:36

## 2017-01-01 RX ADMIN — ACETAMINOPHEN 1000 MG: 500 TABLET ORAL at 17:53

## 2017-01-01 RX ADMIN — INSULIN LISPRO 6 UNITS: 100 INJECTION, SOLUTION INTRAVENOUS; SUBCUTANEOUS at 23:26

## 2017-01-01 RX ADMIN — TRAMADOL HYDROCHLORIDE 50 MG: 50 TABLET, FILM COATED ORAL at 22:43

## 2017-01-01 RX ADMIN — HYDROCODONE BITARTRATE AND ACETAMINOPHEN 1 TABLET: 5; 325 TABLET ORAL at 06:18

## 2017-01-01 RX ADMIN — ACETAMINOPHEN 1000 MG: 500 TABLET ORAL at 12:04

## 2017-01-01 RX ADMIN — MEROPENEM 500 MG: 500 INJECTION, POWDER, FOR SOLUTION INTRAVENOUS at 13:03

## 2017-01-01 RX ADMIN — FENTANYL CITRATE 50 MCG: 50 INJECTION, SOLUTION INTRAMUSCULAR; INTRAVENOUS at 15:15

## 2017-01-01 RX ADMIN — INSULIN LISPRO 3 UNITS: 100 INJECTION, SOLUTION INTRAVENOUS; SUBCUTANEOUS at 11:53

## 2017-01-01 RX ADMIN — MEROPENEM 500 MG: 500 INJECTION, POWDER, FOR SOLUTION INTRAVENOUS at 18:19

## 2017-01-01 RX ADMIN — HYDROCODONE BITARTRATE AND ACETAMINOPHEN 1 TABLET: 325; 10 TABLET ORAL at 08:42

## 2017-01-01 RX ADMIN — INSULIN LISPRO 3 UNITS: 100 INJECTION, SOLUTION INTRAVENOUS; SUBCUTANEOUS at 22:42

## 2017-01-01 RX ADMIN — OSELTAMIVIR PHOSPHATE 30 MG: 30 CAPSULE ORAL at 20:49

## 2017-01-01 RX ADMIN — MEROPENEM 500 MG: 1 INJECTION, POWDER, FOR SOLUTION INTRAVENOUS at 12:45

## 2017-01-01 RX ADMIN — MEROPENEM 500 MG: 500 INJECTION, POWDER, FOR SOLUTION INTRAVENOUS at 23:28

## 2017-01-01 RX ADMIN — Medication 2 MG: at 10:32

## 2017-01-01 RX ADMIN — SODIUM CHLORIDE 100 ML/HR: 900 INJECTION, SOLUTION INTRAVENOUS at 09:00

## 2017-01-01 RX ADMIN — CARBIDOPA AND LEVODOPA 1 TABLET: 25; 100 TABLET ORAL at 08:59

## 2017-01-01 RX ADMIN — TUBERCULIN PURIFIED PROTEIN DERIVATIVE 5 UNITS: 5 INJECTION INTRADERMAL at 10:12

## 2017-01-01 RX ADMIN — Medication 30 ML: at 05:29

## 2017-01-01 RX ADMIN — Medication 400 MG: at 10:32

## 2017-01-01 RX ADMIN — TRAMADOL HYDROCHLORIDE 50 MG: 50 TABLET, FILM COATED ORAL at 22:55

## 2017-01-01 RX ADMIN — SODIUM CHLORIDE 100 ML/HR: 900 INJECTION, SOLUTION INTRAVENOUS at 00:03

## 2017-01-01 RX ADMIN — VANCOMYCIN HYDROCHLORIDE 1250 MG: 10 INJECTION, POWDER, LYOPHILIZED, FOR SOLUTION INTRAVENOUS at 18:16

## 2017-01-01 RX ADMIN — TAMSULOSIN HYDROCHLORIDE 0.4 MG: 0.4 CAPSULE ORAL at 09:45

## 2017-01-01 RX ADMIN — SODIUM CHLORIDE 100 ML/HR: 900 INJECTION, SOLUTION INTRAVENOUS at 00:11

## 2017-01-01 RX ADMIN — SODIUM CHLORIDE 100 ML/HR: 900 INJECTION, SOLUTION INTRAVENOUS at 17:33

## 2017-01-01 RX ADMIN — MEROPENEM 500 MG: 1 INJECTION, POWDER, FOR SOLUTION INTRAVENOUS at 08:00

## 2017-01-01 RX ADMIN — Medication 30 ML: at 18:02

## 2017-01-01 RX ADMIN — DOCUSATE SODIUM 100 MG: 100 CAPSULE, LIQUID FILLED ORAL at 07:49

## 2017-01-01 RX ADMIN — VANCOMYCIN HYDROCHLORIDE 1000 MG: 1 INJECTION, POWDER, LYOPHILIZED, FOR SOLUTION INTRAVENOUS at 12:44

## 2017-01-01 RX ADMIN — Medication 30 ML: at 19:45

## 2017-01-01 RX ADMIN — MIDODRINE HYDROCHLORIDE 5 MG: 5 TABLET ORAL at 08:11

## 2017-01-01 RX ADMIN — MEROPENEM 500 MG: 1 INJECTION, POWDER, FOR SOLUTION INTRAVENOUS at 22:35

## 2017-01-01 RX ADMIN — Medication 400 MG: at 09:45

## 2017-01-01 RX ADMIN — HEPARIN SODIUM 5000 UNITS: 5000 INJECTION, SOLUTION INTRAVENOUS; SUBCUTANEOUS at 16:37

## 2017-01-01 RX ADMIN — MIDODRINE HYDROCHLORIDE 5 MG: 5 TABLET ORAL at 16:37

## 2017-01-01 RX ADMIN — CARBIDOPA AND LEVODOPA 1 TABLET: 25; 100 TABLET ORAL at 10:32

## 2017-01-01 RX ADMIN — TRAMADOL HYDROCHLORIDE 50 MG: 50 TABLET, FILM COATED ORAL at 21:37

## 2017-01-01 RX ADMIN — MEROPENEM 500 MG: 1 INJECTION, POWDER, FOR SOLUTION INTRAVENOUS at 00:00

## 2017-01-01 RX ADMIN — MEMANTINE HYDROCHLORIDE 28 MG: 14 CAPSULE, EXTENDED RELEASE ORAL at 11:07

## 2017-01-01 RX ADMIN — HEPARIN SODIUM 5000 UNITS: 5000 INJECTION, SOLUTION INTRAVENOUS; SUBCUTANEOUS at 15:43

## 2017-01-01 RX ADMIN — CARBIDOPA AND LEVODOPA 1 TABLET: 25; 100 TABLET ORAL at 15:42

## 2017-01-01 RX ADMIN — FENTANYL CITRATE 50 MCG: 50 INJECTION, SOLUTION INTRAMUSCULAR; INTRAVENOUS at 15:31

## 2017-01-01 RX ADMIN — REGULAR STRENGTH 325 MG: 325 TABLET ORAL at 08:17

## 2017-01-01 RX ADMIN — REGULAR STRENGTH 325 MG: 325 TABLET ORAL at 08:12

## 2017-01-01 RX ADMIN — Medication 400 MG: at 08:55

## 2017-01-01 RX ADMIN — DOCUSATE SODIUM 100 MG: 100 CAPSULE, LIQUID FILLED ORAL at 09:45

## 2017-01-01 RX ADMIN — SODIUM CHLORIDE 0.3 G: 900 INJECTION, SOLUTION INTRAVENOUS at 20:22

## 2017-01-01 RX ADMIN — HEPARIN SODIUM 5000 UNITS: 5000 INJECTION, SOLUTION INTRAVENOUS; SUBCUTANEOUS at 09:45

## 2017-01-01 RX ADMIN — TRAMADOL HYDROCHLORIDE 50 MG: 50 TABLET, FILM COATED ORAL at 23:26

## 2017-01-01 RX ADMIN — MIDODRINE HYDROCHLORIDE 5 MG: 5 TABLET ORAL at 15:44

## 2017-01-01 RX ADMIN — CARBIDOPA AND LEVODOPA 1 TABLET: 25; 100 TABLET ORAL at 21:20

## 2017-01-01 RX ADMIN — OXYCODONE HYDROCHLORIDE 10 MG: 5 TABLET ORAL at 04:55

## 2017-01-01 RX ADMIN — ALPRAZOLAM 1 MG: 0.5 TABLET ORAL at 21:34

## 2017-01-01 RX ADMIN — INSULIN LISPRO 6 UNITS: 100 INJECTION, SOLUTION INTRAVENOUS; SUBCUTANEOUS at 23:49

## 2017-01-01 RX ADMIN — GLYCOPYRROLATE 0.4 MG: 0.2 INJECTION INTRAMUSCULAR; INTRAVENOUS at 16:51

## 2017-01-01 RX ADMIN — OSELTAMIVIR PHOSPHATE 30 MG: 30 CAPSULE ORAL at 11:31

## 2017-01-01 RX ADMIN — MIDODRINE HYDROCHLORIDE 5 MG: 5 TABLET ORAL at 16:38

## 2017-01-01 RX ADMIN — MIDODRINE HYDROCHLORIDE 5 MG: 5 TABLET ORAL at 16:11

## 2017-01-01 RX ADMIN — Medication 10 ML: at 10:18

## 2017-01-01 RX ADMIN — HEPARIN SODIUM 5000 UNITS: 5000 INJECTION, SOLUTION INTRAVENOUS; SUBCUTANEOUS at 18:09

## 2017-01-01 RX ADMIN — Medication 2 MG: at 09:33

## 2017-01-01 RX ADMIN — MEROPENEM 500 MG: 1 INJECTION, POWDER, FOR SOLUTION INTRAVENOUS at 09:10

## 2017-01-01 RX ADMIN — ACETAMINOPHEN 650 MG: 325 TABLET, FILM COATED ORAL at 21:20

## 2017-01-01 RX ADMIN — MEROPENEM 500 MG: 500 INJECTION, POWDER, FOR SOLUTION INTRAVENOUS at 17:53

## 2017-01-01 RX ADMIN — HEPARIN SODIUM 5000 UNITS: 5000 INJECTION, SOLUTION INTRAVENOUS; SUBCUTANEOUS at 23:52

## 2017-01-01 RX ADMIN — ATORVASTATIN CALCIUM 40 MG: 40 TABLET, FILM COATED ORAL at 21:35

## 2017-01-01 RX ADMIN — SODIUM CHLORIDE 100 ML/HR: 900 INJECTION, SOLUTION INTRAVENOUS at 05:23

## 2017-01-01 RX ADMIN — Medication 4 MG: at 11:05

## 2017-01-01 RX ADMIN — DOCUSATE SODIUM 100 MG: 100 CAPSULE, LIQUID FILLED ORAL at 09:42

## 2017-01-01 RX ADMIN — PANTOPRAZOLE SODIUM 40 MG: 40 TABLET, DELAYED RELEASE ORAL at 06:01

## 2017-01-01 RX ADMIN — CARBIDOPA AND LEVODOPA 1 TABLET: 25; 100 TABLET ORAL at 22:54

## 2017-01-01 RX ADMIN — MEROPENEM 500 MG: 1 INJECTION, POWDER, FOR SOLUTION INTRAVENOUS at 00:25

## 2017-01-01 RX ADMIN — OXYCODONE HYDROCHLORIDE 5 MG: 5 TABLET ORAL at 08:46

## 2017-01-01 RX ADMIN — INSULIN LISPRO 6 UNITS: 100 INJECTION, SOLUTION INTRAVENOUS; SUBCUTANEOUS at 23:01

## 2017-01-01 RX ADMIN — MIDODRINE HYDROCHLORIDE 5 MG: 5 TABLET ORAL at 08:40

## 2017-01-01 RX ADMIN — MIDODRINE HYDROCHLORIDE 5 MG: 5 TABLET ORAL at 09:10

## 2017-01-01 RX ADMIN — HYDROMORPHONE HYDROCHLORIDE 0.5 MG: 2 INJECTION, SOLUTION INTRAMUSCULAR; INTRAVENOUS; SUBCUTANEOUS at 18:37

## 2017-01-01 RX ADMIN — VANCOMYCIN HYDROCHLORIDE 1000 MG: 1 INJECTION, POWDER, LYOPHILIZED, FOR SOLUTION INTRAVENOUS at 23:57

## 2017-01-01 RX ADMIN — Medication 400 MG: at 08:56

## 2017-01-01 RX ADMIN — INSULIN LISPRO 3 UNITS: 100 INJECTION, SOLUTION INTRAVENOUS; SUBCUTANEOUS at 23:21

## 2017-01-01 RX ADMIN — MIDODRINE HYDROCHLORIDE 5 MG: 5 TABLET ORAL at 09:41

## 2017-01-01 RX ADMIN — OSELTAMIVIR PHOSPHATE 30 MG: 30 CAPSULE ORAL at 07:50

## 2017-01-01 RX ADMIN — PERFLUTREN 1 ML: 6.52 INJECTION, SUSPENSION INTRAVENOUS at 14:38

## 2017-01-01 RX ADMIN — ACETAMINOPHEN 1000 MG: 500 TABLET ORAL at 11:56

## 2017-01-01 RX ADMIN — ACETAMINOPHEN 1000 MG: 500 TABLET ORAL at 00:18

## 2017-01-01 RX ADMIN — DOCUSATE SODIUM 100 MG: 100 CAPSULE, LIQUID FILLED ORAL at 15:41

## 2017-01-01 RX ADMIN — CARBIDOPA AND LEVODOPA 1 TABLET: 25; 100 TABLET ORAL at 16:54

## 2017-01-01 RX ADMIN — CARBIDOPA AND LEVODOPA 1 TABLET: 25; 100 TABLET ORAL at 22:45

## 2017-01-01 RX ADMIN — Medication 400 MG: at 09:10

## 2017-01-01 RX ADMIN — ATORVASTATIN CALCIUM 40 MG: 40 TABLET, FILM COATED ORAL at 22:52

## 2017-01-01 RX ADMIN — INSULIN LISPRO 3 UNITS: 100 INJECTION, SOLUTION INTRAVENOUS; SUBCUTANEOUS at 22:26

## 2017-01-01 RX ADMIN — Medication 400 MG: at 08:40

## 2017-01-01 RX ADMIN — HYDROCODONE BITARTRATE AND ACETAMINOPHEN 1 TABLET: 5; 325 TABLET ORAL at 04:56

## 2017-01-01 RX ADMIN — INSULIN LISPRO 3 UNITS: 100 INJECTION, SOLUTION INTRAVENOUS; SUBCUTANEOUS at 08:39

## 2017-01-01 RX ADMIN — Medication 2 MG: at 05:53

## 2017-01-01 RX ADMIN — PIPERACILLIN SODIUM,TAZOBACTAM SODIUM 3.38 G: 3; .375 INJECTION, POWDER, FOR SOLUTION INTRAVENOUS at 00:29

## 2017-01-01 RX ADMIN — ATORVASTATIN CALCIUM 40 MG: 40 TABLET, FILM COATED ORAL at 23:24

## 2017-01-01 RX ADMIN — SODIUM CHLORIDE 100 ML/HR: 900 INJECTION, SOLUTION INTRAVENOUS at 15:49

## 2017-01-01 RX ADMIN — OSELTAMIVIR PHOSPHATE 30 MG: 30 CAPSULE ORAL at 08:59

## 2017-01-01 RX ADMIN — INSULIN LISPRO 6 UNITS: 100 INJECTION, SOLUTION INTRAVENOUS; SUBCUTANEOUS at 12:00

## 2017-01-01 RX ADMIN — MIDODRINE HYDROCHLORIDE 5 MG: 5 TABLET ORAL at 15:53

## 2017-01-01 RX ADMIN — ATORVASTATIN CALCIUM 40 MG: 40 TABLET, FILM COATED ORAL at 23:29

## 2017-01-01 RX ADMIN — HEPARIN SODIUM 5000 UNITS: 5000 INJECTION, SOLUTION INTRAVENOUS; SUBCUTANEOUS at 22:45

## 2017-01-01 RX ADMIN — REGULAR STRENGTH 325 MG: 325 TABLET ORAL at 09:45

## 2017-01-01 RX ADMIN — TAMSULOSIN HYDROCHLORIDE 0.4 MG: 0.4 CAPSULE ORAL at 09:42

## 2017-01-01 RX ADMIN — SODIUM CHLORIDE 100 ML/HR: 900 INJECTION, SOLUTION INTRAVENOUS at 23:31

## 2017-01-01 RX ADMIN — CARBIDOPA AND LEVODOPA 1 TABLET: 25; 100 TABLET ORAL at 21:35

## 2017-01-01 RX ADMIN — CHLORPROMAZINE HYDROCHLORIDE 25 MG: 25 INJECTION INTRAMUSCULAR at 18:41

## 2017-01-01 RX ADMIN — Medication 2 MG: at 23:42

## 2017-01-01 RX ADMIN — DIATRIZOATE MEGLUMINE AND DIATRIZOATE SODIUM 15 ML: 600; 100 SOLUTION ORAL; RECTAL at 21:14

## 2017-01-01 RX ADMIN — MIDODRINE HYDROCHLORIDE 5 MG: 5 TABLET ORAL at 09:00

## 2017-01-01 RX ADMIN — INSULIN LISPRO 3 UNITS: 100 INJECTION, SOLUTION INTRAVENOUS; SUBCUTANEOUS at 17:05

## 2017-01-01 RX ADMIN — ACETAMINOPHEN 1000 MG: 500 TABLET ORAL at 23:50

## 2017-01-01 RX ADMIN — MEMANTINE HYDROCHLORIDE 28 MG: 14 CAPSULE, EXTENDED RELEASE ORAL at 09:41

## 2017-01-01 RX ADMIN — ATORVASTATIN CALCIUM 40 MG: 40 TABLET, FILM COATED ORAL at 22:47

## 2017-01-01 RX ADMIN — CARBIDOPA AND LEVODOPA 1 TABLET: 25; 100 TABLET ORAL at 22:27

## 2017-01-01 RX ADMIN — VANCOMYCIN HYDROCHLORIDE 1250 MG: 10 INJECTION, POWDER, LYOPHILIZED, FOR SOLUTION INTRAVENOUS at 20:10

## 2017-01-01 RX ADMIN — TAMSULOSIN HYDROCHLORIDE 0.4 MG: 0.4 CAPSULE ORAL at 08:56

## 2017-01-01 RX ADMIN — Medication 10 ML: at 15:14

## 2017-01-01 RX ADMIN — CARBIDOPA AND LEVODOPA 1 TABLET: 25; 100 TABLET ORAL at 08:11

## 2017-01-01 RX ADMIN — DOCUSATE SODIUM 100 MG: 100 CAPSULE, LIQUID FILLED ORAL at 17:04

## 2017-01-01 RX ADMIN — VANCOMYCIN HYDROCHLORIDE 1250 MG: 10 INJECTION, POWDER, LYOPHILIZED, FOR SOLUTION INTRAVENOUS at 00:41

## 2017-01-01 RX ADMIN — VANCOMYCIN HYDROCHLORIDE 1250 MG: 10 INJECTION, POWDER, LYOPHILIZED, FOR SOLUTION INTRAVENOUS at 13:20

## 2017-01-01 RX ADMIN — CARBIDOPA AND LEVODOPA 1 TABLET: 25; 100 TABLET ORAL at 08:41

## 2017-01-01 RX ADMIN — SODIUM CHLORIDE 100 ML/HR: 900 INJECTION, SOLUTION INTRAVENOUS at 04:05

## 2017-01-01 RX ADMIN — SODIUM CHLORIDE 75 ML/HR: 900 INJECTION, SOLUTION INTRAVENOUS at 10:44

## 2017-01-01 RX ADMIN — Medication 30 ML: at 13:44

## 2017-01-01 RX ADMIN — MIDODRINE HYDROCHLORIDE 5 MG: 5 TABLET ORAL at 16:56

## 2017-01-01 RX ADMIN — PIPERACILLIN SODIUM,TAZOBACTAM SODIUM 3.38 G: 3; .375 INJECTION, POWDER, FOR SOLUTION INTRAVENOUS at 16:11

## 2017-01-01 RX ADMIN — TRAMADOL HYDROCHLORIDE 50 MG: 50 TABLET, FILM COATED ORAL at 00:06

## 2017-01-01 RX ADMIN — PANTOPRAZOLE SODIUM 40 MG: 40 TABLET, DELAYED RELEASE ORAL at 05:33

## 2017-01-01 RX ADMIN — DOCUSATE SODIUM 100 MG: 100 CAPSULE, LIQUID FILLED ORAL at 09:09

## 2017-01-01 RX ADMIN — INSULIN LISPRO 3 UNITS: 100 INJECTION, SOLUTION INTRAVENOUS; SUBCUTANEOUS at 22:46

## 2017-01-01 RX ADMIN — INSULIN LISPRO 6 UNITS: 100 INJECTION, SOLUTION INTRAVENOUS; SUBCUTANEOUS at 17:54

## 2017-01-01 RX ADMIN — ACETAMINOPHEN 1000 MG: 500 TABLET ORAL at 05:03

## 2017-01-01 RX ADMIN — INSULIN LISPRO 3 UNITS: 100 INJECTION, SOLUTION INTRAVENOUS; SUBCUTANEOUS at 18:20

## 2017-01-01 RX ADMIN — ALPRAZOLAM 0.5 MG: 0.5 TABLET ORAL at 22:57

## 2017-01-01 RX ADMIN — Medication 400 MG: at 08:59

## 2017-01-01 RX ADMIN — VANCOMYCIN HYDROCHLORIDE 1250 MG: 10 INJECTION, POWDER, LYOPHILIZED, FOR SOLUTION INTRAVENOUS at 11:59

## 2017-01-01 RX ADMIN — Medication 2 MG: at 08:04

## 2017-01-01 RX ADMIN — Medication 2 MG: at 08:53

## 2017-01-01 RX ADMIN — PANTOPRAZOLE SODIUM 40 MG: 40 TABLET, DELAYED RELEASE ORAL at 05:30

## 2017-01-01 RX ADMIN — CARBIDOPA AND LEVODOPA 1 TABLET: 25; 100 TABLET ORAL at 16:39

## 2017-01-01 RX ADMIN — HEPARIN SODIUM 5000 UNITS: 5000 INJECTION, SOLUTION INTRAVENOUS; SUBCUTANEOUS at 06:19

## 2017-01-01 RX ADMIN — PANTOPRAZOLE SODIUM 40 MG: 40 TABLET, DELAYED RELEASE ORAL at 06:19

## 2017-01-01 RX ADMIN — INSULIN LISPRO 3 UNITS: 100 INJECTION, SOLUTION INTRAVENOUS; SUBCUTANEOUS at 22:00

## 2017-01-01 RX ADMIN — NEOSTIGMINE METHYLSULFATE 3 MG: 1 INJECTION INTRAVENOUS at 16:51

## 2017-01-01 RX ADMIN — INSULIN LISPRO 3 UNITS: 100 INJECTION, SOLUTION INTRAVENOUS; SUBCUTANEOUS at 12:40

## 2017-01-01 RX ADMIN — MEROPENEM 500 MG: 1 INJECTION, POWDER, FOR SOLUTION INTRAVENOUS at 09:05

## 2017-01-01 RX ADMIN — MEROPENEM 500 MG: 1 INJECTION, POWDER, FOR SOLUTION INTRAVENOUS at 17:01

## 2017-01-01 RX ADMIN — MIDAZOLAM HYDROCHLORIDE 2 MG: 1 INJECTION, SOLUTION INTRAMUSCULAR; INTRAVENOUS at 12:13

## 2017-01-01 RX ADMIN — CLOPIDOGREL BISULFATE 75 MG: 75 TABLET, FILM COATED ORAL at 10:32

## 2017-01-01 RX ADMIN — DOCUSATE SODIUM 100 MG: 100 CAPSULE, LIQUID FILLED ORAL at 17:53

## 2017-01-01 RX ADMIN — MEROPENEM 500 MG: 500 INJECTION, POWDER, FOR SOLUTION INTRAVENOUS at 05:02

## 2017-01-01 RX ADMIN — OXYCODONE HYDROCHLORIDE 5 MG: 5 TABLET ORAL at 19:12

## 2017-01-01 RX ADMIN — DOCUSATE SODIUM 100 MG: 100 CAPSULE, LIQUID FILLED ORAL at 17:40

## 2017-01-01 RX ADMIN — CARBIDOPA AND LEVODOPA 1 TABLET: 25; 100 TABLET ORAL at 22:01

## 2017-01-01 RX ADMIN — HEPARIN SODIUM 5000 UNITS: 5000 INJECTION, SOLUTION INTRAVENOUS; SUBCUTANEOUS at 07:56

## 2017-01-01 RX ADMIN — MEROPENEM 500 MG: 500 INJECTION, POWDER, FOR SOLUTION INTRAVENOUS at 21:04

## 2017-01-01 RX ADMIN — MEROPENEM 500 MG: 1 INJECTION, POWDER, FOR SOLUTION INTRAVENOUS at 23:37

## 2017-01-01 RX ADMIN — HEPARIN SODIUM 5000 UNITS: 5000 INJECTION, SOLUTION INTRAVENOUS; SUBCUTANEOUS at 22:09

## 2017-01-01 RX ADMIN — MEROPENEM 500 MG: 1 INJECTION, POWDER, FOR SOLUTION INTRAVENOUS at 04:11

## 2017-01-01 RX ADMIN — MEROPENEM 500 MG: 1 INJECTION, POWDER, FOR SOLUTION INTRAVENOUS at 17:46

## 2017-01-01 RX ADMIN — MEROPENEM 500 MG: 1 INJECTION, POWDER, FOR SOLUTION INTRAVENOUS at 04:12

## 2017-01-01 RX ADMIN — SODIUM CHLORIDE 100 ML/HR: 900 INJECTION, SOLUTION INTRAVENOUS at 12:05

## 2017-01-01 RX ADMIN — CARBIDOPA AND LEVODOPA 1 TABLET: 25; 100 TABLET ORAL at 09:10

## 2017-01-01 RX ADMIN — CARBIDOPA AND LEVODOPA 1 TABLET: 25; 100 TABLET ORAL at 08:55

## 2017-01-01 RX ADMIN — ACETAMINOPHEN 1000 MG: 500 TABLET ORAL at 05:30

## 2017-01-01 RX ADMIN — DOCUSATE SODIUM 100 MG: 100 CAPSULE, LIQUID FILLED ORAL at 18:20

## 2017-01-01 RX ADMIN — VANCOMYCIN HYDROCHLORIDE 1250 MG: 10 INJECTION, POWDER, LYOPHILIZED, FOR SOLUTION INTRAVENOUS at 05:53

## 2017-01-01 RX ADMIN — CARBIDOPA AND LEVODOPA 1 TABLET: 25; 100 TABLET ORAL at 22:48

## 2017-01-01 RX ADMIN — MIDODRINE HYDROCHLORIDE 5 MG: 5 TABLET ORAL at 21:50

## 2017-01-01 RX ADMIN — PANTOPRAZOLE SODIUM 40 MG: 40 TABLET, DELAYED RELEASE ORAL at 05:52

## 2017-01-01 RX ADMIN — MEMANTINE HYDROCHLORIDE 28 MG: 14 CAPSULE, EXTENDED RELEASE ORAL at 09:44

## 2017-01-01 RX ADMIN — MEROPENEM 500 MG: 1 INJECTION, POWDER, FOR SOLUTION INTRAVENOUS at 21:24

## 2017-01-01 RX ADMIN — MEMANTINE HYDROCHLORIDE 28 MG: 14 CAPSULE, EXTENDED RELEASE ORAL at 08:41

## 2017-01-01 RX ADMIN — MIDODRINE HYDROCHLORIDE 5 MG: 5 TABLET ORAL at 17:27

## 2017-01-01 RX ADMIN — MIDODRINE HYDROCHLORIDE 5 MG: 5 TABLET ORAL at 08:16

## 2017-01-01 RX ADMIN — Medication 400 MG: at 09:41

## 2017-01-01 RX ADMIN — MIDODRINE HYDROCHLORIDE 5 MG: 5 TABLET ORAL at 22:45

## 2017-01-01 RX ADMIN — CARBIDOPA AND LEVODOPA 1 TABLET: 25; 100 TABLET ORAL at 15:44

## 2017-01-01 RX ADMIN — MIDODRINE HYDROCHLORIDE 5 MG: 5 TABLET ORAL at 16:18

## 2017-01-01 RX ADMIN — CARBIDOPA AND LEVODOPA 1 TABLET: 25; 100 TABLET ORAL at 08:16

## 2017-01-01 RX ADMIN — DOCUSATE SODIUM 100 MG: 100 CAPSULE, LIQUID FILLED ORAL at 18:00

## 2017-01-01 RX ADMIN — HYDROCODONE BITARTRATE AND ACETAMINOPHEN 1 TABLET: 5; 325 TABLET ORAL at 01:57

## 2017-01-01 RX ADMIN — OXYCODONE HYDROCHLORIDE 5 MG: 5 TABLET ORAL at 13:12

## 2017-01-01 RX ADMIN — PANTOPRAZOLE SODIUM 40 MG: 40 TABLET, DELAYED RELEASE ORAL at 05:37

## 2017-01-01 RX ADMIN — INSULIN LISPRO 3 UNITS: 100 INJECTION, SOLUTION INTRAVENOUS; SUBCUTANEOUS at 16:58

## 2017-01-01 RX ADMIN — CHLORPROMAZINE HYDROCHLORIDE 25 MG: 25 INJECTION INTRAMUSCULAR at 11:28

## 2017-01-01 RX ADMIN — HEPARIN SODIUM 5000 UNITS: 5000 INJECTION, SOLUTION INTRAVENOUS; SUBCUTANEOUS at 22:01

## 2017-01-01 RX ADMIN — TAMSULOSIN HYDROCHLORIDE 0.4 MG: 0.4 CAPSULE ORAL at 08:16

## 2017-01-01 RX ADMIN — SODIUM CHLORIDE, SODIUM LACTATE, POTASSIUM CHLORIDE, AND CALCIUM CHLORIDE: 600; 310; 30; 20 INJECTION, SOLUTION INTRAVENOUS at 13:48

## 2017-01-01 RX ADMIN — VANCOMYCIN HYDROCHLORIDE 1250 MG: 10 INJECTION, POWDER, LYOPHILIZED, FOR SOLUTION INTRAVENOUS at 17:41

## 2017-01-01 RX ADMIN — PANTOPRAZOLE SODIUM 40 MG: 40 TABLET, DELAYED RELEASE ORAL at 06:13

## 2017-01-01 RX ADMIN — MIDODRINE HYDROCHLORIDE 5 MG: 5 TABLET ORAL at 07:50

## 2017-01-01 RX ADMIN — CARBIDOPA AND LEVODOPA 1 TABLET: 25; 100 TABLET ORAL at 16:11

## 2017-01-01 RX ADMIN — ACETAMINOPHEN 1000 MG: 500 TABLET ORAL at 18:20

## 2017-01-01 RX ADMIN — MEROPENEM 500 MG: 500 INJECTION, POWDER, FOR SOLUTION INTRAVENOUS at 23:30

## 2017-01-01 RX ADMIN — PANTOPRAZOLE SODIUM 40 MG: 40 TABLET, DELAYED RELEASE ORAL at 07:49

## 2017-01-01 RX ADMIN — PANTOPRAZOLE SODIUM 40 MG: 40 TABLET, DELAYED RELEASE ORAL at 05:34

## 2017-01-01 RX ADMIN — MEROPENEM 500 MG: 1 INJECTION, POWDER, FOR SOLUTION INTRAVENOUS at 08:58

## 2017-01-01 RX ADMIN — SODIUM CHLORIDE 100 ML/HR: 900 INJECTION, SOLUTION INTRAVENOUS at 00:25

## 2017-01-01 RX ADMIN — LIDOCAINE HYDROCHLORIDE 100 MG: 20 INJECTION, SOLUTION EPIDURAL; INFILTRATION; INTRACAUDAL; PERINEURAL at 13:54

## 2017-01-01 RX ADMIN — FENTANYL CITRATE 50 MCG: 50 INJECTION, SOLUTION INTRAMUSCULAR; INTRAVENOUS at 14:57

## 2017-01-01 RX ADMIN — Medication 4 MG: at 15:42

## 2017-01-01 RX ADMIN — SODIUM CHLORIDE 12.5 MG: 9 INJECTION INTRAMUSCULAR; INTRAVENOUS; SUBCUTANEOUS at 09:44

## 2017-01-01 RX ADMIN — POTASSIUM CHLORIDE 20 MEQ: 14.9 INJECTION, SOLUTION INTRAVENOUS at 15:47

## 2017-01-01 RX ADMIN — INSULIN LISPRO 3 UNITS: 100 INJECTION, SOLUTION INTRAVENOUS; SUBCUTANEOUS at 23:45

## 2017-01-01 RX ADMIN — MEMANTINE HYDROCHLORIDE 28 MG: 14 CAPSULE, EXTENDED RELEASE ORAL at 08:55

## 2017-01-01 RX ADMIN — MEROPENEM 500 MG: 1 INJECTION, POWDER, FOR SOLUTION INTRAVENOUS at 15:46

## 2017-01-01 RX ADMIN — CARBIDOPA AND LEVODOPA 1 TABLET: 25; 100 TABLET ORAL at 16:18

## 2017-01-01 RX ADMIN — INSULIN LISPRO 6 UNITS: 100 INJECTION, SOLUTION INTRAVENOUS; SUBCUTANEOUS at 18:05

## 2017-01-01 RX ADMIN — ALPRAZOLAM 0.5 MG: 0.5 TABLET ORAL at 23:24

## 2017-01-01 RX ADMIN — HEPARIN SODIUM 5000 UNITS: 5000 INJECTION, SOLUTION INTRAVENOUS; SUBCUTANEOUS at 09:41

## 2017-01-01 RX ADMIN — REGULAR STRENGTH 325 MG: 325 TABLET ORAL at 08:55

## 2017-01-01 RX ADMIN — SODIUM CHLORIDE 75 ML/HR: 900 INJECTION, SOLUTION INTRAVENOUS at 23:36

## 2017-01-01 RX ADMIN — CARBIDOPA AND LEVODOPA 1 TABLET: 25; 100 TABLET ORAL at 17:04

## 2017-01-01 RX ADMIN — MIDODRINE HYDROCHLORIDE 5 MG: 5 TABLET ORAL at 23:24

## 2017-01-01 RX ADMIN — MEROPENEM 500 MG: 500 INJECTION, POWDER, FOR SOLUTION INTRAVENOUS at 12:05

## 2017-01-01 RX ADMIN — MIDODRINE HYDROCHLORIDE 5 MG: 5 TABLET ORAL at 23:29

## 2017-01-01 RX ADMIN — INSULIN LISPRO 3 UNITS: 100 INJECTION, SOLUTION INTRAVENOUS; SUBCUTANEOUS at 09:20

## 2017-01-01 RX ADMIN — REGULAR STRENGTH 325 MG: 325 TABLET ORAL at 09:10

## 2017-01-01 RX ADMIN — PANTOPRAZOLE SODIUM 40 MG: 40 TABLET, DELAYED RELEASE ORAL at 06:40

## 2017-01-01 RX ADMIN — TAMSULOSIN HYDROCHLORIDE 0.4 MG: 0.4 CAPSULE ORAL at 08:11

## 2017-01-01 RX ADMIN — CLOPIDOGREL BISULFATE 75 MG: 75 TABLET, FILM COATED ORAL at 07:50

## 2017-01-01 RX ADMIN — MORPHINE SULFATE 5 MG: 10 INJECTION INTRAMUSCULAR; INTRAVENOUS; SUBCUTANEOUS at 05:56

## 2017-01-01 RX ADMIN — TAMSULOSIN HYDROCHLORIDE 0.4 MG: 0.4 CAPSULE ORAL at 10:32

## 2017-01-01 RX ADMIN — ACETAMINOPHEN 650 MG: 325 TABLET, FILM COATED ORAL at 20:12

## 2017-01-01 RX ADMIN — OSELTAMIVIR PHOSPHATE 30 MG: 30 CAPSULE ORAL at 23:28

## 2017-01-01 RX ADMIN — PIPERACILLIN SODIUM,TAZOBACTAM SODIUM 3.38 G: 3; .375 INJECTION, POWDER, FOR SOLUTION INTRAVENOUS at 02:20

## 2017-01-01 RX ADMIN — SODIUM CHLORIDE 100 ML/HR: 900 INJECTION, SOLUTION INTRAVENOUS at 11:57

## 2017-01-01 RX ADMIN — NITROGLYCERIN 1 INCH: 20 OINTMENT TOPICAL at 11:05

## 2017-01-01 RX ADMIN — SODIUM CHLORIDE 100 ML/HR: 900 INJECTION, SOLUTION INTRAVENOUS at 05:27

## 2017-01-01 RX ADMIN — ROCURONIUM BROMIDE 10 MG: 10 INJECTION, SOLUTION INTRAVENOUS at 15:15

## 2017-01-01 RX ADMIN — ROCURONIUM BROMIDE 10 MG: 10 INJECTION, SOLUTION INTRAVENOUS at 14:18

## 2017-01-01 RX ADMIN — Medication 30 ML: at 00:45

## 2017-01-01 RX ADMIN — MEROPENEM 500 MG: 1 INJECTION, POWDER, FOR SOLUTION INTRAVENOUS at 15:49

## 2017-01-01 RX ADMIN — VANCOMYCIN HYDROCHLORIDE 1250 MG: 10 INJECTION, POWDER, LYOPHILIZED, FOR SOLUTION INTRAVENOUS at 05:18

## 2017-01-01 RX ADMIN — MEMANTINE HYDROCHLORIDE 28 MG: 14 CAPSULE, EXTENDED RELEASE ORAL at 10:32

## 2017-01-01 RX ADMIN — ATORVASTATIN CALCIUM 40 MG: 40 TABLET, FILM COATED ORAL at 21:19

## 2017-01-01 RX ADMIN — SODIUM CHLORIDE 100 ML/HR: 900 INJECTION, SOLUTION INTRAVENOUS at 17:53

## 2017-01-01 RX ADMIN — FENTANYL CITRATE 50 MCG: 50 INJECTION, SOLUTION INTRAMUSCULAR; INTRAVENOUS at 13:54

## 2017-01-01 RX ADMIN — MEROPENEM 500 MG: 1 INJECTION, POWDER, FOR SOLUTION INTRAVENOUS at 00:23

## 2017-01-01 RX ADMIN — ACETAMINOPHEN 650 MG: 650 SUPPOSITORY RECTAL at 12:05

## 2017-01-01 RX ADMIN — ACETAMINOPHEN 1000 MG: 500 TABLET ORAL at 12:45

## 2017-01-01 RX ADMIN — LABETALOL HYDROCHLORIDE 5 MG: 5 INJECTION, SOLUTION INTRAVENOUS at 16:38

## 2017-01-01 RX ADMIN — SODIUM CHLORIDE 500 ML: 900 INJECTION, SOLUTION INTRAVENOUS at 04:51

## 2017-01-01 RX ADMIN — ACETAMINOPHEN 650 MG: 325 TABLET, FILM COATED ORAL at 10:12

## 2017-01-01 RX ADMIN — CARBIDOPA AND LEVODOPA 1 TABLET: 25; 100 TABLET ORAL at 08:56

## 2017-01-01 RX ADMIN — HEPARIN SODIUM 5000 UNITS: 5000 INJECTION, SOLUTION INTRAVENOUS; SUBCUTANEOUS at 23:01

## 2017-01-01 RX ADMIN — TAMSULOSIN HYDROCHLORIDE 0.4 MG: 0.4 CAPSULE ORAL at 08:55

## 2017-01-01 RX ADMIN — CARBIDOPA AND LEVODOPA 1 TABLET: 25; 100 TABLET ORAL at 17:27

## 2017-01-01 RX ADMIN — Medication 2 MG: at 23:32

## 2017-01-01 RX ADMIN — INSULIN LISPRO 3 UNITS: 100 INJECTION, SOLUTION INTRAVENOUS; SUBCUTANEOUS at 12:45

## 2017-01-01 RX ADMIN — ATORVASTATIN CALCIUM 40 MG: 40 TABLET, FILM COATED ORAL at 22:27

## 2017-01-01 RX ADMIN — Medication 400 MG: at 07:50

## 2017-01-01 RX ADMIN — SODIUM CHLORIDE 100 ML/HR: 900 INJECTION, SOLUTION INTRAVENOUS at 15:19

## 2017-01-01 RX ADMIN — HEPARIN SODIUM 5000 UNITS: 5000 INJECTION, SOLUTION INTRAVENOUS; SUBCUTANEOUS at 00:47

## 2017-01-01 RX ADMIN — ACETAMINOPHEN 1000 MG: 500 TABLET ORAL at 05:15

## 2017-01-01 RX ADMIN — HYDROCODONE BITARTRATE AND ACETAMINOPHEN 1 TABLET: 5; 325 TABLET ORAL at 16:55

## 2017-01-01 RX ADMIN — CARBIDOPA AND LEVODOPA 1 TABLET: 25; 100 TABLET ORAL at 16:37

## 2017-01-01 RX ADMIN — MEROPENEM 500 MG: 500 INJECTION, POWDER, FOR SOLUTION INTRAVENOUS at 05:00

## 2017-01-01 RX ADMIN — SODIUM CHLORIDE, SODIUM LACTATE, POTASSIUM CHLORIDE, AND CALCIUM CHLORIDE: 600; 310; 30; 20 INJECTION, SOLUTION INTRAVENOUS at 14:46

## 2017-01-01 RX ADMIN — MEROPENEM 500 MG: 1 INJECTION, POWDER, FOR SOLUTION INTRAVENOUS at 10:00

## 2017-01-01 RX ADMIN — CARBIDOPA AND LEVODOPA 1 TABLET: 25; 100 TABLET ORAL at 07:55

## 2017-01-01 RX ADMIN — FENTANYL CITRATE 50 MCG: 50 INJECTION, SOLUTION INTRAMUSCULAR; INTRAVENOUS at 12:13

## 2017-01-01 RX ADMIN — IOVERSOL 100 ML: 741 INJECTION INTRA-ARTERIAL; INTRAVENOUS at 10:18

## 2017-01-01 RX ADMIN — INSULIN LISPRO 3 UNITS: 100 INJECTION, SOLUTION INTRAVENOUS; SUBCUTANEOUS at 09:02

## 2017-01-01 RX ADMIN — TAMSULOSIN HYDROCHLORIDE 0.4 MG: 0.4 CAPSULE ORAL at 08:59

## 2017-01-01 RX ADMIN — PANTOPRAZOLE SODIUM 40 MG: 40 TABLET, DELAYED RELEASE ORAL at 05:49

## 2017-01-01 RX ADMIN — CHLORPROMAZINE HYDROCHLORIDE 25 MG: 25 INJECTION INTRAMUSCULAR at 23:59

## 2017-01-01 RX ADMIN — DOCUSATE SODIUM 100 MG: 100 CAPSULE, LIQUID FILLED ORAL at 17:05

## 2017-01-01 RX ADMIN — HEPARIN SODIUM 5000 UNITS: 5000 INJECTION, SOLUTION INTRAVENOUS; SUBCUTANEOUS at 17:06

## 2017-01-01 RX ADMIN — HEPARIN SODIUM 5000 UNITS: 5000 INJECTION, SOLUTION INTRAVENOUS; SUBCUTANEOUS at 13:29

## 2017-01-01 RX ADMIN — HEPARIN SODIUM 5000 UNITS: 5000 INJECTION, SOLUTION INTRAVENOUS; SUBCUTANEOUS at 16:18

## 2017-01-01 RX ADMIN — Medication 2 MG: at 01:02

## 2017-01-01 RX ADMIN — TAMSULOSIN HYDROCHLORIDE 0.4 MG: 0.4 CAPSULE ORAL at 08:40

## 2017-01-01 RX ADMIN — MIDODRINE HYDROCHLORIDE 5 MG: 5 TABLET ORAL at 10:32

## 2017-01-01 RX ADMIN — OXYCODONE HYDROCHLORIDE 5 MG: 5 TABLET ORAL at 15:42

## 2017-01-01 RX ADMIN — HEPARIN SODIUM 5000 UNITS: 5000 INJECTION, SOLUTION INTRAVENOUS; SUBCUTANEOUS at 13:20

## 2017-01-01 RX ADMIN — ACETAMINOPHEN 650 MG: 325 TABLET, FILM COATED ORAL at 01:30

## 2017-01-01 RX ADMIN — ALPRAZOLAM 1 MG: 0.5 TABLET ORAL at 22:43

## 2017-01-01 RX ADMIN — REGULAR STRENGTH 325 MG: 325 TABLET ORAL at 08:59

## 2017-01-01 RX ADMIN — INSULIN LISPRO 3 UNITS: 100 INJECTION, SOLUTION INTRAVENOUS; SUBCUTANEOUS at 21:54

## 2017-01-01 RX ADMIN — TRAMADOL HYDROCHLORIDE 50 MG: 50 TABLET, FILM COATED ORAL at 23:24

## 2017-01-01 RX ADMIN — CARBIDOPA AND LEVODOPA 1 TABLET: 25; 100 TABLET ORAL at 09:41

## 2017-01-01 RX ADMIN — MEROPENEM 500 MG: 1 INJECTION, POWDER, FOR SOLUTION INTRAVENOUS at 16:35

## 2017-01-01 RX ADMIN — ACETAMINOPHEN 650 MG: 325 TABLET, FILM COATED ORAL at 06:16

## 2017-02-15 NOTE — PROGRESS NOTES
Problem: Falls - Risk of  Goal: *Absence of falls  Outcome: Progressing Towards Goal  Pt progressing towards goal. No falls since admission. Bed low and locked. Call light within reach. Side rails x 2. Gripper socks applied. Personal belongings within reach. Pt verbalizes understanding to call for assistance. Bed alarm on. Family at bedside.

## 2017-02-15 NOTE — ED NOTES
Cleaned soiled pt's brief, gown, and linens. Pt resting in bed with bed low and locked. Family present. Pt has history of COPD.

## 2017-02-15 NOTE — ED NOTES
TRANSFER - OUT REPORT:    Verbal report given to Jerry Eastman RN (name) on [place]  being transferred to room 328 (unit) for routine progression of care       Report consisted of patients Situation, Background, Assessment and   Recommendations(SBAR). Information from the following report(s) SBAR, ED Summary, Intake/Output and MAR was reviewed with the receiving nurse. Lines:   Peripheral IV 02/15/17 Left Antecubital (Active)   Site Assessment Clean, dry, & intact 2/15/2017  8:18 AM   Phlebitis Assessment 0 2/15/2017  8:18 AM   Infiltration Assessment 0 2/15/2017  8:18 AM   Dressing Status Clean, dry, & intact 2/15/2017  8:18 AM        Opportunity for questions and clarification was provided.       Patient transported with:   Monitor  Tech ([employer])

## 2017-02-15 NOTE — H&P
[facility] Cardiology History and Physical                Date of  Admission: 2/15/2017  8:09 AM     Primary Care Physician: Dr. Alvin Dukes  Primary Cardiologist: Dr. Cheyenne Crabtree  Referring Physician: Dr. Lindsey Miranda  Admitting Physician: Dr. Liz Diaz is a 80 y.o. male who presents to the ER from [facility] with complaints of chest pain and back pain. He states chest pain started last night. Pain is sharp in nature and radiates to the right side of his chest.  He denies any dyspnea, nausea or diaphoresis. He also complains of severe back pain that started this morning. He states the chest pain was no better with NTG. He states Morphine is the only thing that helped. He has known CAD. He states he has not had chest pain in quite some time. He has orthostatic hypotension and is on chronic midodrine. He denies any cough, fever or chills. 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    Altered mental status R41.82    Nausea & vomiting R11.2    Debility R53.81    Acute encephalopathy G93.40    History of CVA (cerebrovascular accident) Z80.78    Presence of cardiac pacemaker Z95.0       Past Medical History   Diagnosis Date    CAD (coronary artery disease)     Diabetes ([facility] [address]     Hypertension     Neurological disorder      parkinson    Psychiatric disorder       Past Surgical History   Procedure Laterality Date    Pr cardiac surg procedure unlist       Allergies   Allergen Reactions    Naprosyn [Naproxen] Other (comments)    Pravastatin Other (comments)      No family history on file.      Current Facility-Administered Medications   Medication Dose Route Frequency  sodium chloride (NS) flush 5-10 mL  5-10 mL IntraVENous Q8H    sodium chloride (NS) flush 5-10 mL  5-10 mL IntraVENous PRN    HYDROcodone-acetaminophen (NORCO)  mg tablet 1 Tab  1 Tab Oral NOW    nitroglycerin (NITROSTAT) tablet 0.4 mg  0.4 mg SubLINGual Q5MIN PRN     Current Outpatient Prescriptions   Medication Sig    polyethylene glycol (MIRALAX) 17 gram/dose powder Take 17 g by mouth every other day.  midodrine (PROAMITINE) 5 mg tablet Take 5 mg by mouth three (3) times daily.  insulin NPH/insulin regular (NOVOLIN 70/30) 100 unit/mL (70-30) injection 30 Units by SubCUTAneous route once.  senna (SENNA) 8.6 mg tablet Take 2 Tabs by mouth daily.  therapeutic multivitamin (THERA) tablet Take 1 Tab by mouth daily.  cholecalciferol, VITAMIN D3, (VITAMIN D3) 5,000 unit tab tablet Take 5,000 Units by mouth daily.  nicotine (NICORETTE) 2 mg gum Take 2 mg by mouth every three (3) hours as needed for Smoking Cessation.  nitroglycerin (NITROSTAT) 0.4 mg SL tablet 0.4 mg by SubLINGual route every five (5) minutes as needed for Chest Pain.  calcium-cholecalciferol, d3, (CALCIUM 600 + D) 600-125 mg-unit tab Take 1 Tab by mouth daily.  magnesium oxide (MAG-OX) 400 mg tablet Take 400 mg by mouth daily.  promethazine (PHENERGAN) 25 mg tablet Take 25 mg by mouth every six (6) hours as needed for Nausea.  sennosides 8.6 mg cap Take 8.6 mg by mouth two (2) times a day.  tamsulosin (FLOMAX) 0.4 mg capsule Take 0.4 mg by mouth daily.  carbidopa-levodopa (SINEMET)  mg per tablet Take 1 Tab by mouth three (3) times daily.  metFORMIN (GLUCOPHAGE) 500 mg tablet Take 500 mg by mouth two (2) times daily (with meals).  memantine ER (NAMENDA XR) 28 mg capsule Take  by mouth daily.  omeprazole (PRILOSEC) 20 mg capsule Take 20 mg by mouth two (2) times a day.  ALPRAZolam (XANAX) 1 mg tablet Take 1 mg by mouth nightly.     aspirin delayed-release 81 mg tablet Take 325 mg by mouth daily.  clopidogrel (PLAVIX) 75 mg tablet Take 75 mg by mouth daily.  atorvastatin (LIPITOR) 80 mg tablet Take 40 mg by mouth nightly.  traMADol (ULTRAM) 50 mg tablet Take 50 mg by mouth nightly. Take 2 tabs by mouth at bedtime    acetaminophen (TYLENOL) 325 mg tablet Take 500 mg by mouth every four (4) hours as needed for Pain. Review of Systems   Constitution: Negative for chills, fever, weakness, malaise/fatigue, weight gain and weight loss. HENT: Negative for ear pain, headaches, hearing loss, nosebleeds, sore throat and tinnitus. Eyes: Negative for blurred vision, vision loss in left eye and vision loss in right eye. Cardiovascular: Negative for chest pain, dyspnea on exertion, leg swelling, near-syncope, orthopnea, palpitations, paroxysmal nocturnal dyspnea and syncope. Respiratory: Negative for cough, hemoptysis, shortness of breath, sputum production and wheezing. Endocrine: Negative for cold intolerance, heat intolerance and polydipsia. Hematologic/Lymphatic: Does not bruise/bleed easily. Skin: Negative for color change and rash. Musculoskeletal: Negative for back pain, joint pain, joint swelling and myalgias. Gastrointestinal: Negative for abdominal pain, constipation, diarrhea, dysphagia, heartburn, hematemesis, melena, nausea and vomiting. Genitourinary: Negative for dysuria, frequency, hematuria and urgency. Neurological: Negative for difficulty with concentration, dizziness, light-headedness, numbness, paresthesias, seizures and vertigo. Psychiatric/Behavioral: Negative for altered mental status and depression.           Physical Exam  Vitals:    02/15/17 1103 02/15/17 1105 02/15/17 1155 02/15/17 1159   BP: (!) 212/98 (!) 212/98 113/59 113/59   Pulse: 69 69 76 77   Resp: 18      Temp:       SpO2: 93%  98%    Weight:       Height:           Physical Exam:  General: Well Developed, Well Nourished, No Acute Distress  HEENT: pupils equal and round, no abnormalities noted  Neck: supple, no JVD  Heart: S1S2 with RRR  Lungs: Clear throughout auscultation bilaterally  Abd: soft, nontender, nondistended, with good bowel sounds  Ext: warm, no edema, calves supple/nontender, pulses 2+ bilaterally  Skin: warm and dry  Psychiatric: Normal mood and affect  Neurologic: Alert and oriented X 3      Cardiographics    ECG: sinus with RBBB    Labs:   Recent Labs      02/15/17   0819  02/15/17   0814   NA   --   138   K   --   6.2*   BUN   --   21   CREA   --   1.51*   GLU   --   176*   WBC   --   9.1   HGB   --   12.3*   HCT   --   36.1*   PLT   --   191   TNIPOC  0   --      All Cardiac Markers in the last 24 hours:    Lab Results   Component Value Date/Time    TNIPOC 0 02/15/2017 08:19 AM       Assessment/Plan:      Active Problems:    Chest pain-prolonged chest pain with neg troponin, EKG with RBBB, no change with NTG, CTA neg for dissection, will trend troponin, plan nuclear stress test in AM      Back pain-per ER physician      CAD-as above, troponin neg, atypical chest pain, continue medical therapy, for nuclear stress test      Hyperkalemia-?unknown etiology, repeat potassium to ensure accuracy of lab value      Orthostatic hypotension-continue midodrine, allow higher BP       Savanah Thakur PA-C

## 2017-02-15 NOTE — ED TRIAGE NOTES
Pt arrives via [facility] from [facility] c/o chest pain since last night. Pain relieved by 324 mg ASA and 1 SL nitro given PTA by EMS. Pt denies chest pain at this time. Reports 7/10 upper back pain during ambulance ride. Denies n/v or SOB.

## 2017-02-15 NOTE — ED NOTES
Pt arrived via ems with chest pain relieved by 325 mg ASA and 1 SL nitro given by ems. Pt denies chest pain, shortness of breath, and N/V at this time. Complains of upper back pain from the stretcher on the ambulance.

## 2017-02-15 NOTE — ED PROVIDER NOTES
HPI Comments: Patient with history of CAD status post PCI with stents, presents with chest pain onset last night, relieved with nitroglycerin. The patient indicates it is been quite some months since he has needed nitroglycerin. He feels this stabbing sharp chest pain today is reminiscent of what he had with his prior heart attack. Pain is worsened with deep inspiratory effort, nor movement/changes in position. Pain remains gone at this time. Patient also developed upper back pain, onset \"once the EMS put me on the stretcher\". He did not have back pain earlier through the night. And not generally get back pain. Patient is a 80 y.o. male presenting with chest pain. The history is provided by the patient. Chest Pain (Angina)    This is a new problem. Episode onset: midnight. The problem has been resolved. The pain is associated with normal activity. The pain is present in the substernal region. The pain is mild. The quality of the pain is described as sharp. The pain does not radiate. Associated symptoms include back pain. Pertinent negatives include no abdominal pain, no cough, no diaphoresis, no dizziness, no fever, no headaches, no irregular heartbeat, no malaise/fatigue, no nausea, no near-syncope, no palpitations, no shortness of breath and no vomiting. He has tried aspirin and nitroglycerin (1.  Nitroglycerin, from EMS, en route. Pain resolved) for the symptoms. The treatment provided significant relief. Risk factors include cardiac disease. His past medical history is significant for HTN. His past medical history does not include DM. Procedural history includes cardiac catheterization, cardiac stents and pacemaker.        Past Medical History:   Diagnosis Date    CAD (coronary artery disease)     Diabetes ([facility] [address]     Hypertension     Neurological disorder      parkinson    Psychiatric disorder        Past Surgical History:   Procedure Laterality Date    Pr cardiac surg procedure unlist No family history on file. Social History     Social History    Marital status:      Spouse name: N/A    Number of children: N/A    Years of education: N/A     Occupational History    Not on file. Social History Main Topics    Smoking status: Never Smoker    Smokeless tobacco: Current User    Alcohol use No    Drug use: No    Sexual activity: Not on file     Other Topics Concern    Not on file     Social History Narrative         ALLERGIES: Naprosyn [naproxen] and Pravastatin    Review of Systems   Constitutional: Negative for chills, diaphoresis, fever and malaise/fatigue. HENT: Negative for rhinorrhea and sore throat. Eyes: Negative for discharge and redness. Respiratory: Negative for cough and shortness of breath. Cardiovascular: Positive for chest pain. Negative for palpitations and near-syncope. Gastrointestinal: Negative for abdominal pain, nausea and vomiting. Genitourinary: Negative for difficulty urinating. Musculoskeletal: Positive for back pain. Negative for arthralgias and neck pain. Skin: Negative for rash. Neurological: Negative for dizziness and headaches. All other systems reviewed and are negative. Vitals:    02/15/17 0845 02/15/17 0846 02/15/17 0847 02/15/17 0900   BP: 152/70 170/77 170/77 186/67   Pulse: (!) 59 (!) 59 60 61   Resp: 13 12 18 11   Temp:       SpO2: 93% 91% 92% 94%   Weight:       Height:                Physical Exam   Constitutional: He is oriented to person, place, and time. He appears well-developed and well-nourished. HENT:   Head: Normocephalic and atraumatic. Eyes: Conjunctivae are normal. Pupils are equal, round, and reactive to light. Right eye exhibits no discharge. Left eye exhibits no discharge. No scleral icterus. Neck: Normal range of motion. Neck supple. Cardiovascular: Normal rate, regular rhythm and normal heart sounds. Exam reveals no gallop. No murmur heard.   Pulmonary/Chest: Effort normal and breath sounds normal. No respiratory distress. He has no wheezes. He has no rales. He exhibits no tenderness. Abdominal: Soft. Bowel sounds are normal. There is no tenderness. There is no guarding. Musculoskeletal: Normal range of motion. He exhibits tenderness. He exhibits no edema. Neurological: He is alert and oriented to person, place, and time. He exhibits normal muscle tone. cni 2-12 grossly   Skin: Skin is warm and dry. He is not diaphoretic. Psychiatric: He has a normal mood and affect. His behavior is normal.   Nursing note and vitals reviewed. MDM  Number of Diagnoses or Management Options  Acute midline thoracic back pain:   Chest pain, unspecified type:   Diagnosis management comments: Medical decision making note:  Chest pain, back pain, history of CAD. Differential diagnoses to include acute MI, unstable angina, PE, aortic dissection, pleurisy, back strain. EKG and initial set of cardiac enzymes are negative, chest x-ray unrevealing. Chest pain remains relieved with a single nitroglycerin from EMS, back pain is unchanged. Sometime later in the ER, back pain remains undiminished. After 10 mg hydrocodone. We'll perform CT scan aortic dissection protocol after giving some stronger pain medicine. 1:45 PM  Very perplexing case, his daughters agree. Case perhaps being confounded by some degree of dementia. On one report patient got no relief with the initial 2 mg of morphine according to the nurse when she asks the patient, however, the daughters indicate he seemed to get a lot better and fell right asleep. The morphine was administered for the back pain as he had no chest pain at that time. Later patient awakes again complains of back pain in the complaints of chest pain. Again chest pain waxed and waned with nitroglycerin administration. Back pain was still \"very bad\" even after an additional 4 mg of morphine.     Patient complaining of \"10 out of 10\" pain to the nurse and \"severe pain\" to me, even while  his demeanor is that of complete relaxation and no demonstrable evidence of discomfort. CT scan is negative for thoracic dissection. Repeat EKG and repeat cardiac enzymes are negative  \  Case referred to cardiology for chest pain. This concludes the \"medical decision making note\" part of this emergency department visit note. Amount and/or Complexity of Data Reviewed  Tests in the radiology section of CPT®: ordered and reviewed (XR CHEST PORT   Final Result    IMPRESSION: Underexpanded lungs with chronic interstitial marking prominence may    represent interstitial lung disease.  Otherwise, no acute abnormality     CT CHEST W CONT    (Results Pending)  )  Tests in the medicine section of CPT®: ordered and reviewed (Results Include:    Recent Results (from the past 24 hour(s))  -CBC WITH AUTOMATED DIFF  Collection Time: 02/15/17  8:14 AM       Result                                            Value                         Ref Range                       WBC                                               9.1                           4.3 - 11.1 K/uL                 RBC                                               3.70 (L)                      4.23 - 5.67 M/uL                HGB                                               12.3 (L)                      13.6 - 17.2 g/dL                HCT                                               36.1 (L)                      41.1 - 50.3 %                   MCV                                               97.6                          79.6 - 97.8 FL                  MCH                                               33.2 (H)                      26.1 - 32.9 PG                  MCHC                                              34.1                          31.4 - 35.0 g/dL                RDW                                               12.1                          11.9 - 14.6 %                   PLATELET 191                           150 - 450 K/uL                  MPV                                               10.0 (L)                      10.8 - 14.1 FL                  NEUTROPHILS                                       66                            43 - 78 %                       LYMPHOCYTES                                       25                            13 - 44 %                       MONOCYTES                                         7                             4.0 - 12.0 %                    EOSINOPHILS                                       2                             0.5 - 7.8 %                     BASOPHILS                                         0                             0.0 - 2.0 %                     IMMATURE GRANULOCYTES                             0                             0.0 - 5.0 %                     ABS. NEUTROPHILS                                  6.0                           1.7 - 8.2 K/UL                  ABS. LYMPHOCYTES                                  2.3                           0.5 - 4.6 K/UL                  ABS. MONOCYTES                                    0.6                           0.1 - 1.3 K/UL                  ABS. EOSINOPHILS                                  0.2                           0.0 - 0.8 K/UL                  ABS. BASOPHILS                                    0.0                           0.0 - 0.2 K/UL                  ABS. IMM.  GRANS.                                  0.0                           0.0 - 0.5 K/UL                  RBC COMMENTS                                      NORMOCYTIC/NORMOCHROMIC                                       PLATELET COMMENTS                                 ADEQUATE                                                      DF                                                AUTOMATED                                                -METABOLIC PANEL, COMPREHENSIVE  Collection Time: 02/15/17  8:14 AM       Result Value                         Ref Range                       Sodium                                            138                           136 - 145 mmol/L                Potassium                                         6.2 (HH)                      3.5 - 5.1 mmol/L                Chloride                                          103                           98 - 107 mmol/L                 CO2                                               28                            21 - 32 mmol/L                  Anion gap                                         7                             7 - 16 mmol/L                   Glucose                                           176 (H)                       65 - 100 mg/dL                  BUN                                               21                            8 - 23 MG/DL                    Creatinine                                        1.51 (H)                      0.8 - 1.5 MG/DL                 GFR est AA                                        57 (L)                        >60 ml/min/1.73m2               GFR est non-AA                                    47 (L)                        >60 ml/min/1.73m2               Calcium                                           8.6                           8.3 - 10.4 MG/DL                Bilirubin, total                                  0.7                           0.2 - 1.1 MG/DL                 ALT (SGPT)                                        19                            12 - 65 U/L                     AST (SGOT)                                        36                            15 - 37 U/L                     Alk. phosphatase                                  63                            50 - 136 U/L                    Protein, total                                    7.6                           6.3 - 8.2 g/dL                  Albumin                                           3.5 3.2 - 4.6 g/dL                  Globulin                                          4.1 (H)                       2.3 - 3.5 g/dL                  A-G Ratio                                         0.9 (L)                       1.2 - 3.5                  -EKG, 12 LEAD, INITIAL  Collection Time: 02/15/17  8:14 AM       Result                                            Value                         Ref Range                       Systolic BP                                                                     mmHg                            Diastolic BP                                                                    mmHg                            Ventricular Rate                                  59                            BPM                             Atrial Rate                                       59                            BPM                             P-R Interval                                      224                           ms                              QRS Duration                                      130                           ms                              Q-T Interval                                      456                           ms                              QTC Calculation (Bezet)                           451                           ms                              Calculated P Axis                                 91                            degrees                         Calculated R Axis                                 -44                           degrees                         Calculated T Axis                                 -12                           degrees                         Diagnosis                                                                                                   !! AGE AND GENDER SPECIFIC ECG ANALYSIS !!  Sinus bradycardia with 1st degree A-V block Left axis deviation Right bundle branch block Abnormal ECG When compared with ECG of 30-JUN-2016 10:27, No significant change was found   -POC TROPONIN-I  Collection Time: 02/15/17  8:19 AM       Result                                            Value                         Ref Range                       Troponin-I (POC)                                  0                             0.0 - 0.08 ng/ml           )    Risk of Complications, Morbidity, and/or Mortality  Presenting problems: high  Diagnostic procedures: high  Management options: high  General comments: 40 minutes were spent in the care, management, evaluation, and multiple reexaminations of this patient with ongoing chest pain as severe back pain    Critical Care  Total time providing critical care: 30-74 minutes    Patient Progress  Patient progress: stable    ED Course       Procedures

## 2017-02-16 PROBLEM — R50.9 FEVER: Status: ACTIVE | Noted: 2017-01-01

## 2017-02-16 PROBLEM — R07.9 CHEST PAIN: Status: ACTIVE | Noted: 2017-01-01

## 2017-02-16 PROBLEM — D72.829 LEUKOCYTOSIS: Status: ACTIVE | Noted: 2017-01-01

## 2017-02-16 NOTE — PROGRESS NOTES
Brief SW note. Pt is a permanent resident of [facility]. He is admitted on a 10 day Medicaid bed hold which will  on . PT/OT consulted as pt may dc back under the rehab benefit. If he returns to [facility] for rehab, a [employer] precert will be required. If pt has the flu, [facility] requires that he be afebrile for 72 hours prior to return.

## 2017-02-16 NOTE — PROGRESS NOTES
Patient's daughter signed observation letter and expressed understanding. She states that her father and her mother both are at [facility] long [facility]. No other needs identified.

## 2017-02-16 NOTE — CONSULTS
HOSPITALIST H&P/CONSULT  NAME:  Farhan Smith   Age:  80 y.o.  :   1934   MRN:   [id number]  PCP: Kerry Villarreal MD  Consulting MD:  Treatment Team: Attending Provider: Ricardo Mckeon MD; Consulting Provider: Ricardo Mckeon MD; Utilization Review: Carlin Silverman RN; Consulting Provider: Irina Ronquillo MD  HPI:   Patient presented with rt sided chest pain which has progressed to include back and leg pains. Troponin neg on admission. Nuclear stress was unable to be completed as pt unable to lay still. Noted to have a fever overnight of 101 with leukocytosis this am.  Started on tamiflu as pt with reported rigors overnight as well. Resting comfortably currently    Complete ROS done and is as stated in HPI or otherwise negative  Past Medical History   Diagnosis Date    CAD (coronary artery disease)     Diabetes ([facility] [address]     Hypertension     Neurological disorder      parkinson    Psychiatric disorder       Past Surgical History   Procedure Laterality Date    Pr cardiac surg procedure unlist        Prior to Admission Medications   Prescriptions Last Dose Informant Patient Reported? Taking? ALPRAZolam (XANAX) 1 mg tablet   Yes Yes   Sig: Take 1 mg by mouth nightly. acetaminophen (TYLENOL) 325 mg tablet   Yes Yes   Sig: Take 500 mg by mouth every four (4) hours as needed for Pain. aspirin delayed-release 81 mg tablet   Yes Yes   Sig: Take 325 mg by mouth daily. atorvastatin (LIPITOR) 80 mg tablet   Yes Yes   Sig: Take 40 mg by mouth nightly. calcium-cholecalciferol, d3, (CALCIUM 600 + D) 600-125 mg-unit tab   Yes Yes   Sig: Take 1 Tab by mouth daily. carbidopa-levodopa (SINEMET)  mg per tablet   Yes Yes   Sig: Take 1 Tab by mouth three (3) times daily. cholecalciferol, VITAMIN D3, (VITAMIN D3) 5,000 unit tab tablet   Yes Yes   Sig: Take 5,000 Units by mouth daily. clopidogrel (PLAVIX) 75 mg tablet   Yes Yes   Sig: Take 75 mg by mouth daily.    insulin NPH/insulin regular (NOVOLIN 70/30) 100 unit/mL (70-30) injection   Yes Yes   Si Units by SubCUTAneous route once.   magnesium oxide (MAG-OX) 400 mg tablet   Yes Yes   Sig: Take 400 mg by mouth daily. memantine ER (NAMENDA XR) 28 mg capsule   Yes Yes   Sig: Take  by mouth daily. metFORMIN (GLUCOPHAGE) 500 mg tablet   Yes Yes   Sig: Take 500 mg by mouth two (2) times daily (with meals). midodrine (PROAMITINE) 5 mg tablet   Yes Yes   Sig: Take 5 mg by mouth three (3) times daily. nicotine (NICORETTE) 2 mg gum   Yes Yes   Sig: Take 2 mg by mouth every three (3) hours as needed for Smoking Cessation. nitroglycerin (NITROSTAT) 0.4 mg SL tablet   Yes Yes   Si.4 mg by SubLINGual route every five (5) minutes as needed for Chest Pain. omeprazole (PRILOSEC) 20 mg capsule   Yes Yes   Sig: Take 20 mg by mouth two (2) times a day. polyethylene glycol (MIRALAX) 17 gram/dose powder   Yes Yes   Sig: Take 17 g by mouth every other day. promethazine (PHENERGAN) 25 mg tablet   Yes Yes   Sig: Take 25 mg by mouth every six (6) hours as needed for Nausea. sennosides 8.6 mg cap   Yes Yes   Sig: Take 8.6 mg by mouth two (2) times a day. tamsulosin (FLOMAX) 0.4 mg capsule   Yes Yes   Sig: Take 0.4 mg by mouth daily. therapeutic multivitamin (THERA) tablet   Yes Yes   Sig: Take 1 Tab by mouth daily. traMADol (ULTRAM) 50 mg tablet   Yes Yes   Sig: Take 50 mg by mouth nightly. Take 2 tabs by mouth at bedtime      Facility-Administered Medications: None     Allergies   Allergen Reactions    Naprosyn [Naproxen] Other (comments)    Pravastatin Other (comments)      Social History   Substance Use Topics    Smoking status: Never Smoker    Smokeless tobacco: Current User    Alcohol use No      No family history on file.    Objective:     Visit Vitals    /85 (BP 1 Location: Left arm, BP Patient Position: Lying right side)    Pulse 95    Temp 99.7 °F (37.6 °C)    Resp 20    Ht 5' 9\" (1.753 m)    Wt 75.9 kg (167 lb 4.8 oz)    SpO2 94%    BMI 24.71 kg/m2      Temp (24hrs), Av.2 °F (37.3 °C), Min:97.2 °F (36.2 °C), Max:101.7 °F (38.7 °C)    Oxygen Therapy  O2 Sat (%): 94 % (17 1206)  Pulse via Oximetry: 77 beats per minute (02/15/17 1421)  O2 Device: Room air (17 0700)  Physical Exam:  General:    No acute distress    Head:   Normocephalic, without obvious abnormality, atraumatic. Nose:  Nares normal. No drainage or sinus tenderness. Lungs:   Coarse bs bilat  Heart:   Regular rate and rhythm,  no murmur, rub or gallop. Abdomen:   Soft, non-tender. Not distended. Bowel sounds normal.   Extremities: No cyanosis. No edema. No clubbing  Skin:     Texture, turgor normal. No rashes or lesions.   Not Jaundiced    Data Review:   Recent Results (from the past 24 hour(s))   GLUCOSE, POC    Collection Time: 02/15/17  3:50 PM   Result Value Ref Range    Glucose (POC) 176 (H) 65 - 100 mg/dL   TROPONIN I    Collection Time: 02/15/17  4:46 PM   Result Value Ref Range    Troponin-I, Qt. <0.02 (L) 0.02 - 0.05 NG/ML   GLUCOSE, POC    Collection Time: 02/15/17  9:38 PM   Result Value Ref Range    Glucose (POC) 189 (H) 65 - 100 mg/dL   TROPONIN I    Collection Time: 02/15/17 10:00 PM   Result Value Ref Range    Troponin-I, Qt. <0.02 (L) 0.02 - 0.05 NG/ML   INFLUENZA A & B AG (RAPID TEST)    Collection Time: 17  1:20 AM   Result Value Ref Range    Influenza A Ag NEGATIVE  NEG      Influenza B Ag NEGATIVE  NEG     METABOLIC PANEL, BASIC    Collection Time: 17  5:05 AM   Result Value Ref Range    Sodium 138 136 - 145 mmol/L    Potassium 5.7 (H) 3.5 - 5.1 mmol/L    Chloride 101 98 - 107 mmol/L    CO2 25 21 - 32 mmol/L    Anion gap 12 7 - 16 mmol/L    Glucose 168 (H) 65 - 100 mg/dL    BUN 32 (H) 8 - 23 MG/DL    Creatinine 1.87 (H) 0.8 - 1.5 MG/DL    GFR est AA 45 (L) >60 ml/min/1.73m2    GFR est non-AA 37 (L) >60 ml/min/1.73m2    Calcium 9.0 8.3 - 10.4 MG/DL   LIPID PANEL    Collection Time: 17  5:05 AM Result Value Ref Range    LIPID PROFILE          Cholesterol, total 127 <200 MG/DL    Triglyceride 80 35 - 150 MG/DL    HDL Cholesterol 51 40 - 60 MG/DL    LDL, calculated 60 <100 MG/DL    VLDL, calculated 16 6.0 - 23.0 MG/DL    CHOL/HDL Ratio 2.5     CBC W/O DIFF    Collection Time: 02/16/17  5:05 AM   Result Value Ref Range    WBC 17.4 (H) 4.3 - 11.1 K/uL    RBC 4.04 (L) 4.23 - 5.67 M/uL    HGB 13.5 (L) 13.6 - 17.2 g/dL    HCT 39.3 (L) 41.1 - 50.3 %    MCV 97.3 79.6 - 97.8 FL    MCH 33.4 (H) 26.1 - 32.9 PG    MCHC 34.4 31.4 - 35.0 g/dL    RDW 12.4 11.9 - 14.6 %    PLATELET 674 947 - 025 K/uL    MPV 10.5 (L) 10.8 - 14.1 FL   GLUCOSE, POC    Collection Time: 02/16/17  6:24 AM   Result Value Ref Range    Glucose (POC) 168 (H) 65 - 100 mg/dL   EKG, 12 LEAD, INITIAL    Collection Time: 02/16/17  7:27 AM   Result Value Ref Range    Systolic BP  mmHg    Diastolic BP  mmHg    Ventricular Rate 87 BPM    Atrial Rate 87 BPM    P-R Interval 184 ms    QRS Duration 138 ms    Q-T Interval 404 ms    QTC Calculation (Bezet) 486 ms    Calculated P Axis 52 degrees    Calculated R Axis 48 degrees    Calculated T Axis 28 degrees    Diagnosis       Normal sinus rhythm  Right bundle branch block  Abnormal ECG  When compared with ECG of 15-FEB-2017 13:15,  T wave inversion less evident in Inferior leads  T wave inversion no longer evident in Anterior leads  Confirmed by Janessa Moreira MD (), INDRA HORNE ([id number]) on 2/16/2017 7:44:37 AM     URINALYSIS W/ RFLX MICROSCOPIC    Collection Time: 02/16/17  8:20 AM   Result Value Ref Range    Color SHIRA      Appearance CLEAR      Specific gravity 1.048 (H) 1.001 - 1.023      pH (UA) 7.5 5.0 - 9.0      Protein 30 (A) NEG mg/dL    Glucose NEGATIVE  mg/dL    Ketone TRACE (A) NEG mg/dL    Bilirubin SMALL (A) NEG      Blood NEGATIVE  NEG      Urobilinogen 1.0 0.2 - 1.0 EU/dL    Nitrites NEGATIVE  NEG      Leukocyte Esterase NEGATIVE  NEG      WBC 0-3 0 /hpf    RBC 0-3 0 /hpf    Epithelial cells 0-3 0 /hpf    Bacteria 0 0 /hpf    Casts 0 0 /lpf   GLUCOSE, POC    Collection Time: 02/16/17 10:53 AM   Result Value Ref Range    Glucose (POC) 133 (H) 65 - 100 mg/dL     Imaging /Procedures /Studies     Assessment and Plan: Active Hospital Problems    Diagnosis Date Noted    Chest pain 02/16/2017    Fever 02/16/2017    Leukocytosis 02/16/2017    Debility 04/11/2015    CAD (coronary artery disease) 07/21/2014    Orthostatic hypotension 07/21/2014       PLAN  ·  influenza neg however I agree with tamiflu at this time given rigors with more diffuse body aches  · Reviewed UA and ct chest/ab/pelvis without obvious signs of acute infection  · BC drawn. Will add vanco and zosyn to cover for fever of unknown origin. Would stop abx if cx remain neg  Thank you for the opportunity to participate in this patient's care.   Will follow      Signed By: Zackery Aparicio MD     February 16, 2017

## 2017-02-16 NOTE — PROGRESS NOTES
[facility] CARDIOLOGY PROGRESS NOTE           2/16/2017 7:38 AM    Admit Date: 2/15/2017         Subjective: Patient notes continued right sided chest pain that is sharp. He denies any dyspnea or cough. He was febrile overnight.      ROS:  Cardiovascular:  As noted above    Objective:      Vitals:    02/15/17 2058 02/16/17 0108 02/16/17 0300 02/16/17 0551   BP: 113/58 109/57 119/60 135/65   Pulse: 94 (!) 101 98 92   Resp: 17 16 24 20   Temp: 98.4 °F (36.9 °C) (!) 101.7 °F (38.7 °C) 99.5 °F (37.5 °C) 99.1 °F (37.3 °C)   SpO2: 92% 92% 92% 93%   Weight:    75.9 kg (167 lb 4.8 oz)   Height:           On telemetry: sinus rhythm/sinus tachycardia, RBBB      Physical Exam:  General: Well Developed, Well Nourished, No Acute Distress, Alert & Oriented x 3, Appropriate mood  Neck: supple, no JVD  Heart: S1S2 with RRR  Lungs: Clear throughout auscultation bilaterally without adventitious sounds  Abd: soft, nontender, nondistended, with good bowel sounds  Ext: no edema bilaterally  Skin: warm and dry      Data Review:   Recent Labs      02/16/17   0505  02/15/17   1300  02/15/17   0814   NA  138   --   138   K  5.7*  4.5  6.2*   BUN  32*   --   21   CREA  1.87*   --   1.51*   GLU  168*   --   176*   WBC  17.4*   --   9.1   HGB  13.5*   --   12.3*   HCT  39.3*   --   36.1*   PLT  218   --   191   CHOL  127   --    --    TGL  80   --    --    LDLC  60   --    --    HDL  51   --    --        Recent Labs      02/15/17   2200  02/15/17   1646  02/15/17   1329  02/15/17   0819   TNIPOC   --    --   0  0   TROIQ  <0.02*  <0.02*   --    --          Assessment/Plan:     Principal Problem:    Chest pain (2/16/2017)-enzymes neg, still with right sided sharp chest pain, will proceed with Lexiscan nuclear stress    Active Problems:    Orthostatic hypotension (7/21/2014)-continue midodrine, allow higher BP      CAD (coronary artery disease) (7/21/2014)-as above, for nuclear stress test      Debility (4/11/2015)-resides at [facility]      Fever (2/16/2017)-unknown etiology, flu swab neg, will check urine and blood cultures      Leukocytosis (2/16/2017)-as above      Addendum: Pt was unable to lie still and was uncooperative for nuclear stress test. Chest pain was atypical and serial troponins were negative. Jose Thakur PA-C  2/16/2017 7:38 AM    Patient seen and examined by me. Agree with above note by physician extender. Key findings are:  Patient febrile and confused. CV- RRR without MRG  Lungs- Clear bilaterally  Abdomen- soft, non-tender, normal bowel sounds  Extremities- no edema    Plan:  Plan IV rocephin and UA, BLD cx are pending. Suspect UTI.       Dena Grijalva MD

## 2017-02-16 NOTE — PROGRESS NOTES
Pharmacokinetic Consult to Pharmacist    Meenakshi Singh is a 80 y.o. male being treated for FUO with vancomycin and pip/tazo. Height: 5' 9\" (175.3 cm)  Weight: 75.9 kg (167 lb 4.8 oz)  Lab Results   Component Value Date/Time    BUN 32 02/16/2017 05:05 AM    Creatinine 1.87 02/16/2017 05:05 AM    WBC 17.4 02/16/2017 05:05 AM      Estimated Creatinine Clearance: 30.5 mL/min (based on Cr of 1.87). CULTURES:  2/17/17: Influenza - negative, final    BCx - in process    UCx - in process      Day 1 of vancomycin. Goal trough is 12-18, may increase goal as workup becomes more definative. Vancomycin dose initiated at 1.25 g q24h. Will continue to follow patient.       Thank you,  Chelo Acevedo, PharmD, BCPS  Clinical Pharmacist  [phone]

## 2017-02-16 NOTE — PROGRESS NOTES
Pt noted with Tmax 101.7, . Donata Lamp, NP, notified. Orders received for flu swab now. Acetaminophen prn administered per orders.

## 2017-02-16 NOTE — PROGRESS NOTES
Bedside report from [address]. Patient awakens, oriented, delayed responses, right sided sharp chest pain, Cards rounding at bedside.

## 2017-02-16 NOTE — PROGRESS NOTES
Bedside and Verbal shift change report given to Rita Doe RN (oncoming nurse) by self [place]án lujan). Report included the following information SBAR, Kardex, MAR and Recent Results.

## 2017-02-17 NOTE — PROGRESS NOTES
Problem: Self Care Deficits Care Plan (Adult)  Goal: *Acute Goals and Plan of Care (Insert Text)  1. Patient will perform grooming with minimal assistance within 7 days with equipment as needed. 2. Patient will perform upper body dressing with minimal assistance within 7 days with equipment as needed. 3. Patient will perform bathing with moderate assistance within 7 days with equipment as needed. 4. Patient will perform functional transfers with minimal assistance within 7 days with equipment as needed. 5. Pt will participate in B UE therapeutic exercises for 8 minutes with 3 rest breaks within 7 days. 6. Pt and or caregiver to demonstrate and verbalize good understanding of recommendations for increasing safety with functional tasks within 7 days. OCCUPATIONAL THERAPY: Initial Assessment and AM 2/17/2017  INPATIENT: Hospital Day: 3  Payor: Fely Mayer / Plan: [address] [place] [address] HMO / Product Type: Managed Care Medicare /      NAME/AGE/GENDER: Patti Lockhart is a 80 y.o. male       PRIMARY DIAGNOSIS:  chest pain  Leukocytosis Chest pain Chest pain        ICD-10: Treatment Diagnosis:        · Stiffness of Left Shoulder, Not elsewhere classified (M25.612)  · Stiffness of Right Shoulder, Not elsewhere classified (M25.611)  · Generalized Muscle Weakness (M62.81)  · Other lack of cordination (R27.8)   Precautions/Allergies:        Falls, Naprosyn [naproxen] and Pravastatin       ASSESSMENT:      Mr. Heather Angela admitted from [facility] with the above diagnosis. Patient's daughter present and she stated that her father needed some assistance with dressing and bathing prior to admit, however now he needs more assistance with tasks. Patient's daughter stated that her dad was nonambulatory but he could transfer to a wheelchair with assistance. Patient's daughter said that patient was even able to propel his wheelchair and feed himself prior to admit. Patient was maximal assistance for supine to sit. Patient had poor sitting balance. Patient returned to supine. Patient has limited B UE Sh AROM, generalized weakness, and decreased activity tolerance. Patient required extensive assistance with ADLs this date. Recommend Continued Skilled OT to maximize patient's ADL performance and to increase activity tolerance. This section established at most recent assessment   PROBLEM LIST (Impairments causing functional limitations):  1. Decreased Strength  2. Decreased ADL/Functional Activities  3. Decreased Transfer Abilities  4. Decreased Ambulation Ability/Technique  5. Decreased Balance  6. Decreased Activity Tolerance  7. Increased Fatigue  8. Increased Shortness of Breath  9. Decreased Flexibility/Joint Mobility  10. Decreased Knowledge of Precautions  11. Decreased Cognition    INTERVENTIONS PLANNED: (Benefits and precautions of occupational therapy have been discussed with the patient.)  1. Activities of daily living training  2. Adaptive equipment training  3. Balance training  4. Clothing management  5. Cognitive training  6. Donning&doffing training  7. Group therapy  8. Hygiene training  9. Neuromuscular re-eduation  10. Re-evaluation  11. Sensory reintergration training  12. Theraputic activity  13. Theraputic exercise      TREATMENT PLAN: Frequency/Duration: Follow patient 3x's/ week to address above goals. Rehabilitation Potential For Stated Goals: GOOD      RECOMMENDED REHABILITATION/EQUIPMENT: (at time of discharge pending progress): Continue Skilled Therapy. OCCUPATIONAL PROFILE AND HISTORY:   History of Present Injury/Illness (Reason for Referral):  Nisa Lloyd is a 80 y.o. male who presents to the ER from [facility] with complaints of chest pain and back pain. He states chest pain started last night. Pain is sharp in nature and radiates to the right side of his chest. He denies any dyspnea, nausea or diaphoresis. He also complains of severe back pain that started this morning. He states the chest pain was no better with NTG. He states Morphine is the only thing that helped. He has known CAD. He states he has not had chest pain in quite some time. He has orthostatic hypotension and is on chronic midodrine  Past Medical History/Comorbidities:   Mr. Dale Kumar  has a past medical history of CAD (coronary artery disease); Diabetes ([address] Hypertension; Neurological disorder; and Psychiatric disorder. Mr. Dale Kumar  has a past surgical history that includes cardiac surg procedure unlist.  Social History/Living Environment:   Home Environment: Long term care  # Steps to Enter: 0  One/Two Story Residence: One story  Living Alone: No  Support Systems: Family member(s)  Patient Expects to be Discharged to[de-identified] Skilled nursing facility  Current DME Used/Available at Home: Wheelchair (mainly uses w/c; able to SPT with assist)  Tub or Shower Type: Shower  Prior Level of Function/Work/Activity:  Patient able to feed self; patient required assistance with ADLs and transfers. Number of Personal Factors/Comorbidities that affect the Plan of Care  · CAD  · Neurological Disorder: Expanded review of therapy/medical records (1-2):  MODERATE COMPLEXITY   ASSESSMENT OF OCCUPATIONAL PERFORMANCE[de-identified]   Activities of Daily Living:           Basic ADLs (From Assessment) Complex ADLs (From Assessment)   Basic ADL  Feeding: Moderate assistance  Oral Facial Hygiene/Grooming: Maximum assistance  Bathing: Maximum assistance  Upper Body Dressing: Maximum assistance  Lower Body Dressing: Total assistance  Toileting: Total assistance Instrumental ADL  Meal Preparation: Total assistance  Homemaking:  Total assistance  Medication Management: Total assistance  Financial Management: Total assistance   Grooming/Bathing/Dressing Activities of Daily Living     Cognitive Retraining  Safety/Judgement: Fall prevention;Decreased awareness of environment                       Bed/Mat Mobility  Rolling: Contact guard assistance;Minimum assistance  Supine to Sit: Maximum assistance  Sit to Supine: Minimum assistance  Sit to Stand: Minimum assistance;Assist x2  Scooting: Maximum assistance          Most Recent Physical Functioning:   Gross Assessment:                  Posture:  Posture (WDL): Exceptions to WDL  Posture Assessment: Forward head, Rounded shoulders, Trunk flexion, Increased (in sitting)  Balance:  Sitting: Impaired  Sitting - Static: Fair (occasional); Poor (constant support)  Sitting - Dynamic: Poor (constant support)  Standing: Impaired  Standing - Static: Fair (with support)  Standing - Dynamic : Fair (with support) Bed Mobility:  Rolling: Contact guard assistance;Minimum assistance  Supine to Sit: Maximum assistance  Sit to Supine: Minimum assistance  Scooting: Maximum assistance  Wheelchair Mobility:     Transfers:  Sit to Stand: Minimum assistance;Assist x2  Stand to Sit: Minimum assistance;Assist x2                    Patient Vitals for the past 6 hrs:       BP BP Patient Position SpO2 Pulse   02/17/17 0832 127/48 At rest 94 % 100   02/17/17 1257 133/68 At rest 90 % 94        Mental Status  Neurologic State: Alert, Confused  Orientation Level: Disoriented to situation, Disoriented to time, Oriented to person, Oriented to place  Cognition: Decreased attention/concentration, Memory loss, Poor safety awareness  Perception: Cues to maintain midline in sitting  Perseveration: No perseveration noted  Safety/Judgement: Fall prevention, Decreased awareness of environment                               Physical Skills Involved:  1. Range of Motion  2. Balance  3. Mobility  4. Strength  5. Endurance  6. Fine or Gross Motor Coordination  7. Dexterity Cognitive Skills Affected (resulting in the inability to perform in a timely and safe manner):  1. Attending  2. Thinking  3. Problem Solving  4. Mental Sequencing  5. Learning  6. Remembering Psychosocial Skills Affected:  1. Habits  2.  Routines and Behaviors   Number of elements that affect the Plan of Care: 5+:  HIGH COMPLEXITY   CLINICAL DECISION MAKIN[address] [place] [address] AM-PAC 6 Clicks   Basic Mobility Inpatient Short Form  How much help from another person does the patient currently need. .. Total A Lot A Little None   1. Putting on and taking off regular lower body clothing? [X] 1   [ ] 2   [ ] 3   [ ] 4   2. Bathing (including washing, rinsing, drying)? [ ] 1   [X] 2   [ ] 3   [ ] 4   3. Toileting, which includes using toilet, bedpan or urinal?   [X] 1   [ ] 2   [ ] 3   [ ] 4   4. Putting on and taking off regular upper body clothing?   [ ] 1   [X] 2   [ ] 3   [ ] 4   5. Taking care of personal grooming such as brushing teeth? [ ] 1   [X] 2   [ ] 3   [ ] 4   6. Eating meals? [ ] 1   [X] 2   [ ] 3   [ ] 4   © , Trustees of [address] [place] [address], under license to [employer]. All rights reserved    Score:  Initial: CL Most Recent: X (Date: -- )     Interpretation of Tool:  Represents activities that are increasingly more difficult (i.e. Bed mobility, Transfers, Gait). Score 24 23 22-20 19-15 14-10 9-7 6       Modifier CH CI CJ CK CL CM CN         · Self Care:               - CURRENT STATUS:    CL - 60%-79% impaired, limited or restricted               - GOAL STATUS:           CK - 40%-59% impaired, limited or restricted               - D/C STATUS:                       ---------------To be determined---------------  Payor: [employer] MEDICARE / Plan: [address] [place] [address] HMO / Product Type: Managed Care Medicare /       Medical Necessity:     · Patient demonstrates good rehab potential due to higher previous functional level. Reason for Services/Other Comments:  · Patient continues to require skilled intervention due to patient's inability to take care of self.    Use of outcome tool(s) and clinical judgement create a POC that gives a: MODERATE COMPLEXITY             TREATMENT:   (In addition to Assessment/Re-Assessment sessions the following treatments were rendered)      Pre-treatment Symptoms/Complaints:    Pain: Initial:   Pain Location 1: Chest  Pain Orientation 1: Anterior, Right  Pain Intervention(s) 1: Ambulation/Increased Activity  Post Session:        Assessment/Reassessment only, no treatment provided today     Braces/Orthotics/Lines/Etc:   · IV  · O2 Device: Room air  Treatment/Session Assessment:    · Response to Treatment:  Patient tolerated treatment well. · Interdisciplinary Collaboration:  · Physical Therapist  · Registered Nurse  · After treatment position/precautions:  · Supine in bed  · Bed/Chair-wheels locked  · Bed in low position  · Call light within reach  · RN notified  · Family at bedside  · Compliance with Program/Exercises: Will assess as treatment progresses. · Recommendations/Intent for next treatment session: \"Next visit will focus on advancements to more challenging activities and reduction in assistance provided\".   Total Treatment Duration:  OT Patient Time In/Time Out  Time In: 7654  Time Out: [facility],

## 2017-02-17 NOTE — CONSULTS
HOSPITALIST H&P/CONSULT  NAME:  Emy Shah   Age:  80 y.o.  :   1934   MRN:   [id number]  PCP: Shani Gupta MD  Consulting MD:  Treatment Team: Attending Provider: Tejas Martinez MD; Consulting Provider: Tejas Martinez MD; Utilization Review: Julieta Low RN; Consulting Provider: Varghese Aguilar MD; Care Manager: EL Garibay  HPI:   Patient presented with rt sided chest pain which has progressed to include back and leg pains. Troponin neg on admission. Nuclear stress was unable to be completed as pt unable to lay still. Noted to have a fever overnight of 101 with leukocytosis this am.  Started on tamiflu as pt with reported rigors overnight as well. Resting comfortably currently    2017- pt seen complains of pain all over and to rt shoulder. Currently afebrile. Difficult historian. Complete ROS done and is as stated in HPI or otherwise negative  Past Medical History   Diagnosis Date    CAD (coronary artery disease)     Diabetes ([facility] [address]     Hypertension     Neurological disorder      parkinson    Psychiatric disorder       Past Surgical History   Procedure Laterality Date    Pr cardiac surg procedure unlist        Prior to Admission Medications   Prescriptions Last Dose Informant Patient Reported? Taking? ALPRAZolam (XANAX) 1 mg tablet   Yes Yes   Sig: Take 1 mg by mouth nightly. acetaminophen (TYLENOL) 325 mg tablet   Yes Yes   Sig: Take 500 mg by mouth every four (4) hours as needed for Pain. aspirin delayed-release 81 mg tablet   Yes Yes   Sig: Take 325 mg by mouth daily. atorvastatin (LIPITOR) 80 mg tablet   Yes Yes   Sig: Take 40 mg by mouth nightly. calcium-cholecalciferol, d3, (CALCIUM 600 + D) 600-125 mg-unit tab   Yes Yes   Sig: Take 1 Tab by mouth daily. carbidopa-levodopa (SINEMET)  mg per tablet   Yes Yes   Sig: Take 1 Tab by mouth three (3) times daily.    cholecalciferol, VITAMIN D3, (VITAMIN D3) 5,000 unit tab tablet   Yes Yes   Sig: Take 5,000 Units by mouth daily. clopidogrel (PLAVIX) 75 mg tablet   Yes Yes   Sig: Take 75 mg by mouth daily. insulin NPH/insulin regular (NOVOLIN 70/30) 100 unit/mL (70-30) injection   Yes Yes   Si Units by SubCUTAneous route once.   magnesium oxide (MAG-OX) 400 mg tablet   Yes Yes   Sig: Take 400 mg by mouth daily. memantine ER (NAMENDA XR) 28 mg capsule   Yes Yes   Sig: Take  by mouth daily. metFORMIN (GLUCOPHAGE) 500 mg tablet   Yes Yes   Sig: Take 500 mg by mouth two (2) times daily (with meals). midodrine (PROAMITINE) 5 mg tablet   Yes Yes   Sig: Take 5 mg by mouth three (3) times daily. nicotine (NICORETTE) 2 mg gum   Yes Yes   Sig: Take 2 mg by mouth every three (3) hours as needed for Smoking Cessation. nitroglycerin (NITROSTAT) 0.4 mg SL tablet   Yes Yes   Si.4 mg by SubLINGual route every five (5) minutes as needed for Chest Pain. omeprazole (PRILOSEC) 20 mg capsule   Yes Yes   Sig: Take 20 mg by mouth two (2) times a day. polyethylene glycol (MIRALAX) 17 gram/dose powder   Yes Yes   Sig: Take 17 g by mouth every other day. promethazine (PHENERGAN) 25 mg tablet   Yes Yes   Sig: Take 25 mg by mouth every six (6) hours as needed for Nausea. sennosides 8.6 mg cap   Yes Yes   Sig: Take 8.6 mg by mouth two (2) times a day. tamsulosin (FLOMAX) 0.4 mg capsule   Yes Yes   Sig: Take 0.4 mg by mouth daily. therapeutic multivitamin (THERA) tablet   Yes Yes   Sig: Take 1 Tab by mouth daily. traMADol (ULTRAM) 50 mg tablet   Yes Yes   Sig: Take 50 mg by mouth nightly. Take 2 tabs by mouth at bedtime      Facility-Administered Medications: None     Allergies   Allergen Reactions    Naprosyn [Naproxen] Other (comments)    Pravastatin Other (comments)      Social History   Substance Use Topics    Smoking status: Never Smoker    Smokeless tobacco: Current User    Alcohol use No      No family history on file.    Objective:     Visit Vitals    /68 (BP 1 Location: Right arm, BP Patient Position: At rest)    Pulse 94    Temp 97.4 °F (36.3 °C)    Resp 18    Ht 5' 9\" (1.753 m)    Wt 76 kg (167 lb 8 oz)    SpO2 90%    BMI 24.74 kg/m2      Temp (24hrs), Av.1 °F (37.3 °C), Min:97.3 °F (36.3 °C), Max:102.2 °F (39 °C)    Oxygen Therapy  O2 Sat (%): 90 % (17 1257)  Pulse via Oximetry: 77 beats per minute (02/15/17 1421)  O2 Device: Room air (17 1005)  Physical Exam:  General:    No acute distress    Head:   Normocephalic, without obvious abnormality, atraumatic. Nose:  Nares normal. No drainage or sinus tenderness. Lungs:   Coarse bs bilat  Heart:   Regular rate and rhythm,  no murmur, rub or gallop. Abdomen:   Soft, non-tender. Not distended. Bowel sounds normal.   Extremities: No cyanosis. No edema. No clubbing  Skin:     Texture, turgor normal. No rashes or lesions.   Not Jaundiced    Data Review:   Recent Results (from the past 24 hour(s))   GLUCOSE, POC    Collection Time: 17  4:01 PM   Result Value Ref Range    Glucose (POC) 177 (H) 65 - 100 mg/dL   CULTURE, BLOOD    Collection Time: 17  8:40 PM   Result Value Ref Range    Special Requests: LEFT HAND      Culture result: NO GROWTH AFTER 9 HOURS     GLUCOSE, POC    Collection Time: 17  9:29 PM   Result Value Ref Range    Glucose (POC) 162 (H) 65 - 100 mg/dL   GLUCOSE, POC    Collection Time: 17  5:53 AM   Result Value Ref Range    Glucose (POC) 102 (H) 65 - 100 mg/dL   CBC WITH AUTOMATED DIFF    Collection Time: 17 10:45 AM   Result Value Ref Range    WBC 11.6 (H) 4.3 - 11.1 K/uL    RBC 3.71 (L) 4.23 - 5.67 M/uL    HGB 12.4 (L) 13.6 - 17.2 g/dL    HCT 35.8 (L) 41.1 - 50.3 %    MCV 96.5 79.6 - 97.8 FL    MCH 33.4 (H) 26.1 - 32.9 PG    MCHC 34.6 31.4 - 35.0 g/dL    RDW 12.6 11.9 - 14.6 %    PLATELET 167 (L) 722 - 450 K/uL    MPV 10.4 (L) 10.8 - 14.1 FL    DF AUTOMATED      NEUTROPHILS 81 (H) 43 - 78 %    LYMPHOCYTES 12 (L) 13 - 44 %    MONOCYTES 6 4.0 - 12.0 % EOSINOPHILS 0 (L) 0.5 - 7.8 %    BASOPHILS 0 0.0 - 2.0 %    IMMATURE GRANULOCYTES 0.9 0.0 - 5.0 %    ABS. NEUTROPHILS 9.5 (H) 1.7 - 8.2 K/UL    ABS. LYMPHOCYTES 1.3 0.5 - 4.6 K/UL    ABS. MONOCYTES 0.6 0.1 - 1.3 K/UL    ABS. EOSINOPHILS 0.0 0.0 - 0.8 K/UL    ABS. BASOPHILS 0.0 0.0 - 0.2 K/UL    ABS. IMM. GRANS. 0.1 0.0 - 0.5 K/UL   METABOLIC PANEL, BASIC    Collection Time: 02/17/17 10:45 AM   Result Value Ref Range    Sodium 138 136 - 145 mmol/L    Potassium 4.6 3.5 - 5.1 mmol/L    Chloride 101 98 - 107 mmol/L    CO2 23 21 - 32 mmol/L    Anion gap 14 7 - 16 mmol/L    Glucose 135 (H) 65 - 100 mg/dL    BUN 34 (H) 8 - 23 MG/DL    Creatinine 1.91 (H) 0.8 - 1.5 MG/DL    GFR est AA 44 (L) >60 ml/min/1.73m2    GFR est non-AA 36 (L) >60 ml/min/1.73m2    Calcium 8.1 (L) 8.3 - 10.4 MG/DL   GLUCOSE, POC    Collection Time: 02/17/17 12:10 PM   Result Value Ref Range    Glucose (POC) 142 (H) 65 - 100 mg/dL     Imaging /Procedures /Studies     Assessment and Plan:      Active Hospital Problems    Diagnosis Date Noted    Chest pain 02/16/2017    Fever 02/16/2017    Leukocytosis 02/16/2017    Debility 04/11/2015    CAD (coronary artery disease) 07/21/2014    Orthostatic hypotension 07/21/2014       PLAN  ·  Continue tamiflu  · Continue bs abx  · Follow up cultures  · Rt shoulder xray for the severe pain- pain meds  · Wbc improved  · Will follow    Signed By: Frederick Severe, MD     February 17, 2017

## 2017-02-17 NOTE — ADT AUTH CERT NOTES
Utilization Review           Systemic or Infectious Condition GRG - Care Day 2 (2/17/2017) by Berny Justice RN        Review Status Review Entered       Completed 2/17/2017       Details              Care Day: 2 Care Date: 2/17/2017 Level of Care:        Guideline Day 1        Clinical Status       (X) * Clinical Indications met [H]       2/17/2017 10:38 AM EST by Bharat Nelson         Febrile to 102 and leukocytosis- WEAK - doubt cardiac issue at present. Monitor and treat clinically for now.                     Interventions       (X) Inpatient interventions as needed       2/17/2017 10:38 AM EST by Bharat Nelson         PT, OT, PHARMACY CONSULT, CARDIAC DIET, CARD MONITORING, BEDREST NS DRIP @ 75, ZOSYN 3.375G Q8H IV, VANCOCIN 1250 MG QD IV                                          * Milestone              Additional Notes       CARD NOTE       Subjective:       No angina, CHF, or palpitations. BP stable, but weak and febrile overnight with higher WBC today.        Physical Exam:       Neck- supple, no JVD       CV- regular rate and rhythm no MRG       Lung- clear bilaterally apically, decreased bibasilar       Abd- soft, nontender, nondistended       Ext- no edema       Skin- warm and dry               Assessment and Plan:               Principal Problem:       Chest pain (2/16/2017)- negative serial enzymes, couldn't lie still for nuclear stress test. No further evaluation planned for now unless CP recurs                Active Problems:       Orthostatic hypotension - continue midodrine as currently taking, follow BP, stay hydrated, follow up               CAD (coronary artery disease) - medical therapy, see above               Debility (4/11/2015)- permissive HTN given age, debility               Fever (2/16/2017)- recurrent last night, WBC higher, on IV ABX, appreciate hospitalist assistance.  Flu swab negative.                Leukocytosis (2/16/2017)- await urine and blood culture results, on IV ABX, recheck labs in AM                       /61, TEMP 97.3, HR 88, RR 18, 2O 93 ON RA       WBC 2/16 - 17.4       NO IMAGING                         Systemic or Infectious Condition GRG - Care Day 1 (2/16/2017) by Jacquelyne Homans, RN        Review Status Review Entered       Completed 2/17/2017       Details              Care Day: 1 Care Date: 2/16/2017 Level of Care:        Guideline Day 1        Clinical Status       (X) * Clinical Indications met [H]              Interventions       (X) Inpatient interventions as needed       2/17/2017 10:30 AM EST by Ray Andrews         SEE OTHER NOTES FOR 2/16/17                                          * Milestone                  Systemic or Infectious Condition GRG - Clinical Indications for Admission to Inpatient Care by Jacquelyne Homans, RN        Review Status Review Entered       Completed 2/17/2017       Details              Clinical Indications for Admission to Inpatient Care       Most Recent : Ray Andrews Most Recent Date: 2/17/2017 10:29 AM EST       (X) Hospital admission is needed for appropriate care of the patient because of 1 or more of the        following :          (X) Systemic or infectious condition causing severe symptoms or findings not responsive to emergency           or observation care treatment (as appropriate) indicated by 1 or more of the following           :             (X) Severe electrolyte abnormalities             2/17/2017 10:29 AM EST by Ray Andrews               K 5.7

## 2017-02-17 NOTE — PROGRESS NOTES
[facility] CARDIOLOGY PROGRESS NOTE    2/17/2017 9:01 AM    Admit Date: 2/15/2017    Admit Diagnosis: chest pain;Leukocytosis      Subjective:   No angina, CHF, or palpitations. BP stable, but weak and febrile overnight with higher WBC today. Objective:      Vitals:    02/16/17 2200 02/17/17 0128 02/17/17 0531 02/17/17 0832   BP: 150/61 129/64 137/61 127/48   Pulse:  95 88 100   Resp:  17 18 18   Temp:  98.2 °F (36.8 °C) 97.3 °F (36.3 °C) 99.8 °F (37.7 °C)   SpO2:  95% 93% 94%   Weight:   76 kg (167 lb 8 oz)    Height:           Physical Exam:  Neck- supple, no JVD  CV- regular rate and rhythm no MRG  Lung- clear bilaterally apically, decreased bibasilar  Abd- soft, nontender, nondistended  Ext- no edema  Skin- warm and dry    Data Review:   Recent Labs      02/16/17   0505  02/15/17   1300  02/15/17   0814   NA  138   --   138   K  5.7*  4.5  6.2*   BUN  32*   --   21   CREA  1.87*   --   1.51*   GLU  168*   --   176*   WBC  17.4*   --   9.1   HGB  13.5*   --   12.3*   HCT  39.3*   --   36.1*   PLT  218   --   191   CHOL  127   --    --    HDL  51   --    --        Assessment and Plan:     Principal Problem:    Chest pain (2/16/2017)- negative serial enzymes, couldn't lie still for nuclear stress test.  Febrile to 102 and leukocytosis- doubt cardiac issue at present. Monitor and treat clinically for now. No further evaluation planned for now unless CP recurs     Active Problems:    Orthostatic hypotension - continue midodrine as currently taking, follow BP, stay hydrated, follow up      CAD (coronary artery disease) - medical therapy, see above      Debility (4/11/2015)- permissive HTN given age, debility      Fever (2/16/2017)- recurrent last night, WBC higher, on IV ABX, appreciate hospitalist assistance. Flu swab negative. Leukocytosis (2/16/2017)- await urine and blood culture results, on IV ABX, recheck labs in AM        A.  Pinky Barnett MD  [address] Cardiology  Pager [phone]

## 2017-02-17 NOTE — PROGRESS NOTES
Problem: Mobility Impaired (Adult and Pediatric)  Goal: *Acute Goals and Plan of Care (Insert Text)  LTG:  (1.)Mr. Bernarda Pacheco will move from supine to sit and sit to supine , scoot up and down and roll side to side with MODIFIED INDEPENDENCE within 7 day(s). (2.)Mr. Bernarda Pacheco will transfer from bed to chair and chair to bed with CONTACT GUARD ASSIST using the least restrictive device within 7 day(s). (3.)Mr. Bernarda Pacheco will ambulate with CONTACT GUARD ASSIST for 5 feet with the least restrictive device within 7 day(s). (4.)Mr. Bernarda Pacheco will propel manual wheelchair with B hands and feet for 150 within 7 days for increased mobility and strength.   _____________________________________________________________________________________________      PHYSICAL THERAPY: INITIAL ASSESSMENT, AM 2/17/2017  INPATIENT: Hospital Day: 3  Payor: Will Shaw / Plan: [address] [place] [address] HMO / Product Type: [employer] Medicare /      NAME/AGE/GENDER: Arley Hoyos is a 80 y.o. male       PRIMARY DIAGNOSIS: chest pain  Leukocytosis Chest pain Chest pain        ICD-10: Treatment Diagnosis:       · Generalized Muscle Weakness (M62.81)  · Difficulty in walking, Not elsewhere classified (R26.2)   Precaution/Allergies:  Naprosyn [naproxen] and Pravastatin       ASSESSMENT:      Mr. Bernarda Pacheco presents with decreased bed mobility, transfers, ambulation, balance, activity tolerance and overall general functional mobility s/p hospital admission with chest pain. Pt lives in long term care at [facility], mainly uses manual w/c for mobility, able to SPT with assist from bed to chair. Pt daughter present during assessment. Pt very lethargic/drowsy, decreased concentration, can visibly see pt does not feel well. Pt max A for bed mobility, fair/poor static sitting balance this date, sit to stand with MIN A x 2 for safety. Pt able to take shuffled side steps to [facility], returned to supine with increased mobility and only min A.  PT felt pt unsafe to leave up in chair at this time due to increased drowsiness and lack of command following. PT to follow for acute care needs. Pt to return to [facility] hopefully with therapy at discharge. This section established at most recent assessment   PROBLEM LIST (Impairments causing functional limitations):  1. Decreased Strength  2. Decreased ADL/Functional Activities  3. Decreased Transfer Abilities  4. Decreased Ambulation Ability/Technique  5. Decreased Balance  6. Decreased Activity Tolerance  7. Increased Fatigue  8. Decreased Cognition    INTERVENTIONS PLANNED: (Benefits and precautions of physical therapy have been discussed with the patient.)  1. Balance Exercise  2. Bed Mobility  3. Family Education  4. Gait Training  5. Home Exercise Program (HEP)  6. Therapeutic Activites  7. Therapeutic Exercise/Strengthening  8. Transfer Training  9. Group Therapy      TREATMENT PLAN: Frequency/Duration: 3 times a week for duration of hospital stay  Rehabilitation Potential For Stated Goals: GOOD      RECOMMENDED REHABILITATION/EQUIPMENT: (at time of discharge pending progress): Continue Skilled Therapy. HISTORY:   History of Present Injury/Illness (Reason for Referral):  Farhan Smith is a 80 y.o. male who presents to the ER from [facility] with complaints of chest pain and back pain. He states chest pain started last night. Pain is sharp in nature and radiates to the right side of his chest. He denies any dyspnea, nausea or diaphoresis. He also complains of severe back pain that started this morning. He states the chest pain was no better with NTG. He states Morphine is the only thing that helped. He has known CAD. He states he has not had chest pain in quite some time. He has orthostatic hypotension and is on chronic midodrine. Past Medical History/Comorbidities:   Mr. Balbir Parekh  has a past medical history of CAD (coronary artery disease); Diabetes ([facility] [address] Hypertension; Neurological disorder; and Psychiatric disorder.    Markos Wilder  has a past surgical history that includes cardiac surg procedure unlist.  Social History/Living Environment:   Home Environment: Long term care  # Steps to Enter: 0  One/Two Story Residence: One story  Living Alone: No  Support Systems: Family member(s)  Patient Expects to be Discharged to[de-identified] Skilled nursing facility  Current DME Used/Available at Home: Wheelchair (mainly uses w/c; able to SPT with assist)  Tub or Shower Type: Shower  Prior Level of Function/Work/Activity:  Lives at [facility], uses wheelchair for mobility, required assist for ADLs  Personal Factors:          Sex:  male        Age:  80 y.o. Number of Personal Factors/Comorbidities that affect the Plan of Care:  Age, dementia, CAD, DM 3+: HIGH COMPLEXITY   EXAMINATION:   Most Recent Physical Functioning:   Gross Assessment:  AROM: Generally decreased, functional (B LE)  Strength: Generally decreased, functional (B LE, difficulty following direction)  Coordination: Generally decreased, functional               Posture:  Posture (WDL): Exceptions to WDL  Posture Assessment: Forward head, Rounded shoulders, Trunk flexion, Increased (in sitting)  Balance:  Sitting: Impaired  Sitting - Static: Fair (occasional); Poor (constant support)  Sitting - Dynamic: Poor (constant support)  Standing: Impaired  Standing - Static: Fair (with support)  Standing - Dynamic : Fair (with support) Bed Mobility:  Rolling: Contact guard assistance;Minimum assistance  Supine to Sit: Maximum assistance  Sit to Supine: Minimum assistance  Scooting: Maximum assistance  Wheelchair Mobility:     Transfers:  Sit to Stand: Minimum assistance;Assist x2  Stand to Sit: Minimum assistance;Assist x2  Gait:     Base of Support: Center of gravity altered; Widened  Speed/Deandra: Pace decreased (<100 feet/min); Shuffled  Step Length: Left shortened;Right shortened  Swing Pattern: Left asymmetrical;Right asymmetrical  Gait Abnormalities: Decreased step clearance;Shuffling gait; Steppage gait  Distance (ft): 3 Feet (ft) (at bedside)  Assistive Device:  (HHA x 2 for safety)  Ambulation - Level of Assistance: Minimal assistance;Assist x2  Interventions: Safety awareness training;Verbal cues       Body Structures Involved:  1. Joints  2. Muscles  3. Ligaments Body Functions Affected:  1. Movement Related Activities and Participation Affected:  1. General Tasks and Demands  2. Mobility  3. Self Care   Number of elements that affect the Plan of Care: 4+: HIGH COMPLEXITY   CLINICAL PRESENTATION:   Presentation: Stable and uncomplicated: LOW COMPLEXITY   CLINICAL DECISION MAKIN[address] [place] [address] AM-PAC 6 Clicks   Basic Mobility Inpatient Short Form  How much difficulty does the patient currently have. .. Unable A Lot A Little None   1. Turning over in bed (including adjusting bedclothes, sheets and blankets)? [ ] 1   [ ] 2   [X] 3   [ ] 4   2. Sitting down on and standing up from a chair with arms ( e.g., wheelchair, bedside commode, etc.)   [ ] 1   [X] 2   [ ] 3   [ ] 4   3. Moving from lying on back to sitting on the side of the bed? [ ] 1   [X] 2   [ ] 3   [ ] 4   How much help from another person does the patient currently need. .. Total A Lot A Little None   4. Moving to and from a bed to a chair (including a wheelchair)? [ ] 1   [ ] 2   [X] 3   [ ] 4   5. Need to walk in hospital room? [ ] 1   [ ] 2   [X] 3   [ ] 4   6. Climbing 3-5 steps with a railing? [ ] 1   [X] 2   [ ] 3   [ ] 4   © , Trustees of [address] [place] [address], under license to [employer]. All rights reserved    Score:  Initial: 15 Most Recent: X (Date: -- )     Interpretation of Tool:  Represents activities that are increasingly more difficult (i.e. Bed mobility, Transfers, Gait).        Score 24 23 22-20 19-15 14-10 9-7 6       Modifier CH CI CJ CK CL CM CN         · Mobility - Walking and Moving Around:               - CURRENT STATUS:    CK - 40%-59% impaired, limited or restricted               - GOAL STATUS:           CJ - 20%-39% impaired, limited or restricted               - D/C STATUS:                       ---------------To be determined---------------  Payor: Murali Barrientos / Plan: [address] HMO / Product Type: Managed Care Medicare /       Medical Necessity:     · Patient is expected to demonstrate progress in strength, range of motion, balance and coordination to decrease assistance required with overall functional mobility, transfers, ambulation. · Patient demonstrates good and fair rehab potential due to higher previous functional level. Reason for Services/Other Comments:  · Patient continues to require present interventions due to patient's inability to perform bed mobility, transfers safely and effectively at prior level of function of CGA. Use of outcome tool(s) and clinical judgement create a POC that gives a: Clear prediction of patient's progress: LOW COMPLEXITY                 TREATMENT:   (In addition to Assessment/Re-Assessment sessions the following treatments were rendered)   Pre-treatment Symptoms/Complaints:  No complaints, limited response, drowsy  Pain: Initial:   Pain Intensity 1: 0  Post Session:  0      Assessment/Reassessment only, no treatment provided today     Braces/Orthotics/Lines/Etc:   · O2 Device: Room air  Treatment/Session Assessment:    · Response to Treatment:  See above  · Interdisciplinary Collaboration:  · Physical Therapist  · Registered Nurse  · After treatment position/precautions:  · Supine in bed  · Bed/Chair-wheels locked  · Bed in low position  · Call light within reach  · RN notified  · Family at bedside  · Side rails x 2  · Compliance with Program/Exercises: Will assess as treatment progresses. · Recommendations/Intent for next treatment session: \"Next visit will focus on advancements to more challenging activities\".   Total Treatment Duration:  PT Patient Time In/Time Out  Time In: 1005  Time Out: [address], PT

## 2017-02-17 NOTE — PROGRESS NOTES
SW met with pt's daughter, Valerie Deleon (cell: [phone]) in room today. She is agreeable to pt returning to [facility] under the rehab benefit. Referral sent, rehab bed offered and precert initiated for [facility].

## 2017-02-18 NOTE — PROGRESS NOTES
2/18/2017 11:11 AM    Admit Date: 2/15/2017    Admit Diagnosis: chest pain;Leukocytosis      Subjective:   More somnolent- no cp or sob      Objective:      Visit Vitals    /57 (BP 1 Location: Left arm, BP Patient Position: Lying right side)    Pulse 96    Temp 98.1 °F (36.7 °C)    Resp 16    Ht 5' 9\" (1.753 m)    Wt 77.6 kg (171 lb 1.6 oz)    SpO2 92%    BMI 25.27 kg/m2       Physical Exam:  General-Well Developed, Well Nourished, No Acute Distress, Alert & Oriented x 3, appropriate mood. Neck- supple, no JVD  CV- regular rate and rhythm no MRG  Lung- clear bilaterally  Abd- soft, nontender, nondistended  Ext- no edema bilaterally. Skin- warm and dry        Data Review:   Recent Labs      02/18/17   0342   02/16/17   0505   NA  139   < >  138   K  3.6   < >  5.7*   BUN  35*   < >  32*   CREA  1.72*   < >  1.87*   WBC  10.0   < >  17.4*   HGB  10.8*   < >  13.5*   HCT  31.9*   < >  39.3*   PLT  122*   < >  218   HDL   --    --   51    < > = values in this interval not displayed. Assessment/Plan:     Principal Problem:    Chest pain (2/16/2017)Resolved. Continue current meds      Active Problems:    Orthostatic hypotension (7/21/2014)      CAD (coronary artery disease) (7/21/2014)Stable. Continue current medical therapy.       Debility (4/11/2015)      Fever (2/16/2017) persists- per primary- home when IM w/u resolved      Leukocytosis (2/16/2017)

## 2017-02-18 NOTE — PROGRESS NOTES
Problem: Falls - Risk of  Goal: *Absence of falls  Outcome: Progressing Towards Goal  Pt progressing towards goal. No falls since admission. Bed low and locked. Call light within reach. Side rails x 2. Gripper socks applied. Personal belongings within reach. Pt verbalizes understanding to call for assistance.    BED ALARM IS ON

## 2017-02-18 NOTE — CONSULTS
HOSPITALIST H&P/CONSULT  NAME:  Amado Keller   Age:  80 y.o.  :   1934   MRN:   [id number]  PCP: Raul Cedillo MD  Consulting MD:  Treatment Team: Attending Provider: Mathew Steen MD; Consulting Provider: Mathew Steen MD; Utilization Review: Arvind Aranda RN; Consulting Provider: Elmo Bal MD; Care Manager: EL hKanna  HPI:   Patient presented with rt sided chest pain which has progressed to include back and leg pains. Troponin neg on admission. Nuclear stress was unable to be completed as pt unable to lay still. Noted to have a fever overnight of 101 with leukocytosis this am.  Started on tamiflu as pt with reported rigors overnight as well. Resting comfortably currently    2017- pt seen complains of pain all over and to rt shoulder. Currently afebrile. Difficult historian.    2017- pt still with low grade fevers this am but deffervescing     Complete ROS done and is as stated in HPI or otherwise negative  Past Medical History   Diagnosis Date    CAD (coronary artery disease)     Diabetes ([facility] [address]     Hypertension     Neurological disorder      parkinson    Psychiatric disorder       Past Surgical History   Procedure Laterality Date    Pr cardiac surg procedure unlist        Prior to Admission Medications   Prescriptions Last Dose Informant Patient Reported? Taking? ALPRAZolam (XANAX) 1 mg tablet   Yes Yes   Sig: Take 1 mg by mouth nightly. acetaminophen (TYLENOL) 325 mg tablet   Yes Yes   Sig: Take 500 mg by mouth every four (4) hours as needed for Pain. aspirin delayed-release 81 mg tablet   Yes Yes   Sig: Take 325 mg by mouth daily. atorvastatin (LIPITOR) 80 mg tablet   Yes Yes   Sig: Take 40 mg by mouth nightly. calcium-cholecalciferol, d3, (CALCIUM 600 + D) 600-125 mg-unit tab   Yes Yes   Sig: Take 1 Tab by mouth daily.    carbidopa-levodopa (SINEMET)  mg per tablet   Yes Yes   Sig: Take 1 Tab by mouth three (3) times daily. cholecalciferol, VITAMIN D3, (VITAMIN D3) 5,000 unit tab tablet   Yes Yes   Sig: Take 5,000 Units by mouth daily. clopidogrel (PLAVIX) 75 mg tablet   Yes Yes   Sig: Take 75 mg by mouth daily. insulin NPH/insulin regular (NOVOLIN 70/30) 100 unit/mL (70-30) injection   Yes Yes   Si Units by SubCUTAneous route once.   magnesium oxide (MAG-OX) 400 mg tablet   Yes Yes   Sig: Take 400 mg by mouth daily. memantine ER (NAMENDA XR) 28 mg capsule   Yes Yes   Sig: Take  by mouth daily. metFORMIN (GLUCOPHAGE) 500 mg tablet   Yes Yes   Sig: Take 500 mg by mouth two (2) times daily (with meals). midodrine (PROAMITINE) 5 mg tablet   Yes Yes   Sig: Take 5 mg by mouth three (3) times daily. nicotine (NICORETTE) 2 mg gum   Yes Yes   Sig: Take 2 mg by mouth every three (3) hours as needed for Smoking Cessation. nitroglycerin (NITROSTAT) 0.4 mg SL tablet   Yes Yes   Si.4 mg by SubLINGual route every five (5) minutes as needed for Chest Pain. omeprazole (PRILOSEC) 20 mg capsule   Yes Yes   Sig: Take 20 mg by mouth two (2) times a day. polyethylene glycol (MIRALAX) 17 gram/dose powder   Yes Yes   Sig: Take 17 g by mouth every other day. promethazine (PHENERGAN) 25 mg tablet   Yes Yes   Sig: Take 25 mg by mouth every six (6) hours as needed for Nausea. sennosides 8.6 mg cap   Yes Yes   Sig: Take 8.6 mg by mouth two (2) times a day. tamsulosin (FLOMAX) 0.4 mg capsule   Yes Yes   Sig: Take 0.4 mg by mouth daily. therapeutic multivitamin (THERA) tablet   Yes Yes   Sig: Take 1 Tab by mouth daily. traMADol (ULTRAM) 50 mg tablet   Yes Yes   Sig: Take 50 mg by mouth nightly.  Take 2 tabs by mouth at bedtime      Facility-Administered Medications: None     Allergies   Allergen Reactions    Naprosyn [Naproxen] Other (comments)    Pravastatin Other (comments)      Social History   Substance Use Topics    Smoking status: Never Smoker    Smokeless tobacco: Current User    Alcohol use No      No family history on file. Objective:     Visit Vitals    /45 (BP 1 Location: Left arm, BP Patient Position: Lying right side)    Pulse 82    Temp 98.3 °F (36.8 °C)    Resp 18    Ht 5' 9\" (1.753 m)    Wt 77.6 kg (171 lb 1.6 oz)    SpO2 96%    BMI 25.27 kg/m2      Temp (24hrs), Av.7 °F (37.1 °C), Min:97.6 °F (36.4 °C), Max:100.3 °F (37.9 °C)    Oxygen Therapy  O2 Sat (%): 96 % (17 1145)  Pulse via Oximetry: 77 beats per minute (02/15/17 1421)  O2 Device: Room air (17 0615)  Physical Exam:  General:    No acute distress    Head:   Normocephalic, without obvious abnormality, atraumatic. Nose:  Nares normal. No drainage or sinus tenderness. Lungs:   Coarse bs bilat  Heart:   Regular rate and rhythm,  no murmur, rub or gallop. Abdomen:   Soft, non-tender. Not distended. Bowel sounds normal.   Extremities: No cyanosis. No edema. No clubbing  Skin:     Texture, turgor normal. No rashes or lesions.   Not Jaundiced    Data Review:   Recent Results (from the past 24 hour(s))   GLUCOSE, POC    Collection Time: 17  4:06 PM   Result Value Ref Range    Glucose (POC) 148 (H) 65 - 100 mg/dL   GLUCOSE, POC    Collection Time: 17  9:19 PM   Result Value Ref Range    Glucose (POC) 218 (H) 65 - 100 mg/dL   MAGNESIUM    Collection Time: 17  3:42 AM   Result Value Ref Range    Magnesium 2.2 1.8 - 2.4 mg/dL   CBC WITH AUTOMATED DIFF    Collection Time: 17  3:42 AM   Result Value Ref Range    WBC 10.0 4.3 - 11.1 K/uL    RBC 3.26 (L) 4.23 - 5.67 M/uL    HGB 10.8 (L) 13.6 - 17.2 g/dL    HCT 31.9 (L) 41.1 - 50.3 %    MCV 97.9 (H) 79.6 - 97.8 FL    MCH 33.1 (H) 26.1 - 32.9 PG    MCHC 33.9 31.4 - 35.0 g/dL    RDW 12.5 11.9 - 14.6 %    PLATELET 729 (L) 605 - 450 K/uL    MPV 11.0 10.8 - 14.1 FL    DF AUTOMATED      NEUTROPHILS 79 (H) 43 - 78 %    LYMPHOCYTES 11 (L) 13 - 44 %    MONOCYTES 9 4.0 - 12.0 %    EOSINOPHILS 1 0.5 - 7.8 %    BASOPHILS 0 0.0 - 2.0 %    IMMATURE GRANULOCYTES 0.3 0.0 - 5.0 %    ABS. NEUTROPHILS 7.9 1.7 - 8.2 K/UL    ABS. LYMPHOCYTES 1.1 0.5 - 4.6 K/UL    ABS. MONOCYTES 0.9 0.1 - 1.3 K/UL    ABS. EOSINOPHILS 0.1 0.0 - 0.8 K/UL    ABS. BASOPHILS 0.0 0.0 - 0.2 K/UL    ABS. IMM. GRANS. 0.0 0.0 - 0.5 K/UL   METABOLIC PANEL, BASIC    Collection Time: 02/18/17  3:42 AM   Result Value Ref Range    Sodium 139 136 - 145 mmol/L    Potassium 3.6 3.5 - 5.1 mmol/L    Chloride 103 98 - 107 mmol/L    CO2 23 21 - 32 mmol/L    Anion gap 13 7 - 16 mmol/L    Glucose 105 (H) 65 - 100 mg/dL    BUN 35 (H) 8 - 23 MG/DL    Creatinine 1.72 (H) 0.8 - 1.5 MG/DL    GFR est AA 49 (L) >60 ml/min/1.73m2    GFR est non-AA 41 (L) >60 ml/min/1.73m2    Calcium 7.9 (L) 8.3 - 10.4 MG/DL   GLUCOSE, POC    Collection Time: 02/18/17  6:21 AM   Result Value Ref Range    Glucose (POC) 115 (H) 65 - 100 mg/dL   GLUCOSE, POC    Collection Time: 02/18/17  7:16 AM   Result Value Ref Range    Glucose (POC) 131 (H) 65 - 100 mg/dL   GLUCOSE, POC    Collection Time: 02/18/17 12:10 PM   Result Value Ref Range    Glucose (POC) 159 (H) 65 - 100 mg/dL     Imaging /Procedures /Studies     Assessment and Plan:      Active Hospital Problems    Diagnosis Date Noted    Chest pain 02/16/2017    Fever 02/16/2017    Leukocytosis 02/16/2017    Debility 04/11/2015    CAD (coronary artery disease) 07/21/2014    Orthostatic hypotension 07/21/2014       PLAN  · Continue tamiflu  · Continue bs abx  · Pt still with low grade fevers- will need to be afebrile prior to return to facility  · Follow up cultures  · Rt shoulder x-ray for the severe pain- showed djd- continue pain meds  · Wbc improved  · Will follow    Signed By: Kulwinder Dahl MD     February 18, 2017

## 2017-02-18 NOTE — PROGRESS NOTES
Bedside report given to Carlos Luis RN for continuation of care. All questions answered at this time.

## 2017-02-18 NOTE — PROGRESS NOTES
Bedside and Verbal shift change report given to Deepa Morgan RN (oncoming nurse) by self Dylan lujan). Report included the following information SBAR, Kardex and MAR.

## 2017-02-18 NOTE — PROGRESS NOTES
Bedside and Verbal shift change report given to self (oncoming nurse) by Minerva Saucedo RN (offgoing nurse). Report included the following information SBAR, Kardex, MAR, Recent Results and Cardiac Rhythm paced.

## 2017-02-19 NOTE — CONSULTS
Ysei Mathews M.D. Colon and Rectal Surgeon  [address], [address] [place] [address], [facility] Siria Blancas   Phone: [phone] Fax: [phone]      Consult Note  2/19/2017    Cristhian Burden  MRN: [id number]    Requesting Provider: Dr Radha Phillips    Primary Care Physician: Brett Galeas MD    Reason for Consult: possible cholecystitis    HISTORY OF PRESENT ILLNESS:   Cristhian Burden is a 80y.o. year old male who I was asked to see regarding possible cholecystitis. He is lethargic because of a recent pain medication administration, and history comes from the chart and his daughter in the room. The patient developed chest pain on Wednesday of last week. He came to the hospital and was admitted. He has been worked up from a cardiac standpoint and this was negative. Pain progressively moved from his chest, then into the center of his back, and then into his right shoulder. Daughter states he has had abdominal pain for the last ~24 hours. No n/v. He has had fevers up to 101.7 and leukocytosis of 17 (2/16) in the hospital.  Hospitalist saw the patient and started Tamiflu due to fevers, rigors, and diffuse body aches. Due to persistent symptoms, CT and Ultrasound were done. US showed no gallstones.       REVIEW OF SYSTEMS: unobtainable due to mental status    PAST MEDICAL HISTORY:  CAD, DM2, HTN, parkinsons,     PAST SURGICAL HISTORY:  None    ALLERGIES: Naprosyn, pravachol    CURRENT MEDICATIONS:   Current Facility-Administered Medications   Medication Dose Route Frequency    VANCOMYCIN INFORMATION NOTE   Other ONCE    chlorproMAZINE (THORAZINE) injection 25 mg  25 mg IntraMUSCular Q4H PRN    oseltamivir (TAMIFLU) capsule 30 mg  30 mg Oral Q12H    meropenem 500 mg in 0.9% sodium chloride 50 mL IVPB  500 mg IntraVENous Q8H    docusate sodium (COLACE) capsule 100 mg  100 mg Oral BID    0.9% sodium chloride infusion  100 mL/hr IntraVENous CONTINUOUS    vancomycin (VANCOCIN) 1250 mg in  ml infusion  1,250 mg IntraVENous Q24H    sodium chloride (NS) flush 5-10 mL  5-10 mL IntraVENous PRN    ALPRAZolam (XANAX) tablet 1 mg  1 mg Oral QHS    aspirin delayed-release tablet 325 mg  325 mg Oral DAILY    atorvastatin (LIPITOR) tablet 40 mg  40 mg Oral QHS    clopidogrel (PLAVIX) tablet 75 mg  75 mg Oral DAILY    carbidopa-levodopa (SINEMET)  mg per tablet 1 Tab  1 Tab Oral TID    magnesium oxide (MAG-OX) tablet 400 mg  400 mg Oral DAILY    memantine ER (NAMENDA XR) capsule 28 mg  28 mg Oral DAILY    midodrine (PROAMITINE) tablet 5 mg  5 mg Oral TID    pantoprazole (PROTONIX) tablet 40 mg  40 mg Oral ACB    promethazine (PHENERGAN) tablet 25 mg  25 mg Oral Q6H PRN    tamsulosin (FLOMAX) capsule 0.4 mg  0.4 mg Oral DAILY    traMADol (ULTRAM) tablet 50 mg  50 mg Oral QHS    insulin lispro (HUMALOG) injection   SubCUTAneous AC&HS    nitroglycerin (NITROSTAT) tablet 0.4 mg  0.4 mg SubLINGual Q5MIN PRN    HYDROcodone-acetaminophen (NORCO) 5-325 mg per tablet 1 Tab  1 Tab Oral Q6H PRN    morphine injection 2 mg  2 mg IntraVENous Q3H PRN    acetaminophen (TYLENOL) tablet 650 mg  650 mg Oral Q4H PRN       SOCIAL HISTORY:  to his wife of 60 years. They live in [place] at [facility]. He has two children, four grandchildren, and five great grandchildren. Worked as a .  Previous smoker x 20 years, quit in 1980s. No alcohol use    PREVENTION: Last colonoscopy ~10 years ago, never had polyps    FAMILY HISTORY: No colon or rectal cancer. No history of polyps. No breast, prostate, ovary, endometrial, gastric, or pancreatic cancers. PHYSICAL EXAM:  Blood pressure 142/74, pulse 87, temperature 99.6 °F (37.6 °C), resp. rate 20, height 5' 9\" (1.753 m), weight 173 lb 11.2 oz (78.8 kg), SpO2 92 %. Body mass index is 25.65 kg/(m^2).   The patient was evaluated in the presence of a medical assistant  General: Normotensive, sleeping and in no acute distress, well developed, well nourished appearing. He is lethargic and difficult to rouse (due to recent pain medications, per daughter). When I get him to wake, he denies abdominal pain, nausea, vomiting. He quickly falls back to sleep. He has hiccups, which seem to bother him   Head:  AT/NC, no lesions   Eyes:  PERRLA, EOM's full, conjunctivae clear   Neck:  Supple, no masses, no lymphadenopathy, no thyromegaly, no carotid bruits   Chest:  Lungs clear, no rales, no rhonchi, no wheezes   Heart:  RR, no murmurs, no rubs, no gallops   Abdomen:  Soft, no tenderness, no rebound, no guarding, no masses, nondistended. Completely benign abdomen today   Rectal:  Deferred   Back:  Normal curvature, no tenderness. Negative Higgins's punch. Extremities:  FROM, no deformities, no edema, no erythema   Neuro:  Physiologic, oriented x3, full affect, no localizing findings   Skin:  Normal, no rashes, no lesions noted. Recent Labs      02/19/17   0355  02/18/17   0342  02/17/17   1045   WBC  9.2  10.0  11.6*   HGB  9.6*  10.8*  12.4*   HCT  28.6*  31.9*  35.8*   PLT  112*  122*  136*       Recent Labs      02/19/17   0355  02/18/17   0342  02/17/17   1045   NA  140  139  138   K  3.6  3.6  4.6   CL  106  103  101   CO2  24  23  23   BUN  32*  35*  34*   CREA  1.59*  1.72*  1.91*   TBILI  0.9   --    --    SGOT  77*   --    --    ALT  13   --    --    AP  204*   --    --    MG   --   2.2   --      UA with 30 protein, trace ketones, small bili, no LE or nitrites, bact negative  Trending troponins negative  Lipids normal  Urine cx 2/16/17--neg x2 days  Blood cx 2/16/17--neg x3 days  Blood cx 2/18/17--neg x 7 hrs  Blood cx 2/19/17--pending  Flu negative  Labs reviewed by me. Echo 2/16/17--\"SUMMARY:  Left ventricle: Systolic function was at the lower limits of normal. Ejection fraction was estimated in the range of 50 % to 55 %. There were no regional wall motion abnormalities.  There was mild concentric hypertrophy.  Right ventricle: There was mild pulmonary artery hypertension. Right atrium: The atrium  was mildly dilated. There was a possible, large, spherical, solid, fixed mass in the atrial cavity. The possibility that this  Is a tumor cannot be excluded.  Mitral valve: There was mild regurgitation. \"    IMAGING: Images reviewed by me. 2/15/17--CT angio: \"IMPRESSION:  No acute arterial findings. However, there is extensive coronary artery disease present.  Extensive bilateral pulmonary parenchymal disease, worse within the right lung. \"  2/18/17--CT abd/pelvis: \"New trace right pleural effusion with extensive peripheral reticular and cystic change in the right lung base similar in appearance to previous exam.    The lack of IV contrast limits the evaluation of solid abdominal organs, hollow viscera, and vascular structures. The gallbladder is mildly distended with evidence of gallbladder wall thickening and small amount of perihepatic ascites and fluid in the right upper quadrant. No gross abnormalities of the unenhanced liver, spleen, pancreas, right kidney, or adrenal glands. Left upper pole renal cyst is noted. No hydronephrosis or asymmetric perinephric stranding. Abdominal aorta is nonaneurysmal with atherosclerotic calcifications. No abnormally dilated loops of bowel. The appendix is not definitively identified without secondary signs of acute appendicitis. Moderate volume colonic stool is present throughout the colon. The urinary bladder contour is within normal limits. No free pelvic fluid. No acute osseous abnormality. IMPRESSION:  There is evidence of mild gallbladder wall thickening with new fluid/stranding in the adjacent right upper quadrant, this is nonspecific but given history of abdominal pain and fever raises concern for cholecystitis.  Consider further evaluation with right upper quadrant ultrasound if clinically warranted.   New small right pleural effusion with persistent parenchymal disease in the visualized lung bases. \"  2/19/17--RUQ US: \"The pancreas is obscured and not well evaluated due to bowel gas. The liver displays normal echogenicity. No evidence of focal disease. No intrahepatic biliary ductal dilatation. Small amount of perihepatic fluid. The juxtahepatic IVC is unremarkable. The visualized abdominal aorta is of normal caliber.   The gallbladder demonstrates edematous wall thickening measuring up to 6 mm in thickness. No shadowing stones identified. Sonographic Higgins's sign is reported as negative. The common duct measures 4 mm.   The right kidney measures 9.7 cm longitudinally. No hydronephrosis. IMPRESSION: Edematous appearing gallbladder wall thickening is nonspecific without evidence of cholelithiasis and reportedly negative sonographic Higgins's sign. Cholecystitis cannot be completely excluded, other possible etiology include but are not limited to volume overload, hepatic failure, or renal failure. \"    ASSESSMENT:  Abdominal pain, by history only, none on exam today  Anemia, dilutional and phlebotomy  Thrombocytopenia  Leukocytosis--resolved  Mildly elevated LFTs  JIM--improved    RECOMMENDATIONS:  --His abdominal exam is unimpressive today. This may be related to his sedation, but he has no guarding on palpation and his abdomen is completely soft and benign. Additionally, he has no stones to explain an acute cholecystitis. He has some gallbladder wall thickening, which could be acute but could also be chronic. He has very mildly elevated LFTs, which is nonspecific. He may have had a stone that passed (?)  --I recommend continued conservative therapy at this time. Serial abdominal exams  --May consider HIDA to assess patency of cystic duct  --will follow    Odell Casarez MD  2/19/2017      Elements of this note have been created with speech-recognition software. As a result, errors in speech recognition may have occurred.

## 2017-02-19 NOTE — PROGRESS NOTES
2/19/2017 11:44 AM    Admit Date: 2/15/2017    Admit Diagnosis: chest pain;Leukocytosis      Subjective:   Somnolent- no cp or sob      Objective:      Visit Vitals    /73 (BP 1 Location: Left arm, BP Patient Position: At rest)    Pulse 92    Temp 100.1 °F (37.8 °C)    Resp 18    Ht 5' 9\" (1.753 m)    Wt 78.8 kg (173 lb 11.2 oz)    SpO2 90%    BMI 25.65 kg/m2       Physical Exam:  [facility], Well Nourished, No Acute Distress, Alert & Oriented x 3, appropriate mood. Neck- supple, no JVD  CV- regular rate and rhythm no MRG  Lung- clear bilaterally  Abd- soft, nontender, nondistended  Ext- no edema bilaterally. Skin- warm and dry        Data Review:   Recent Labs      02/19/17   0355   NA  140   K  3.6   BUN  32*   CREA  1.59*   WBC  9.2   HGB  9.6*   HCT  28.6*   PLT  112*       Assessment/Plan:     Principal Problem:    Chest pain (2/16/2017)Improved with current therapy. Will continue medications      Active Problems:    Orthostatic hypotension (7/21/2014)      CAD (coronary artery disease) (7/21/2014)Stable. Continue current medical therapy.       Debility (4/11/2015)      Fever (2/16/2017)- persists- surgery to see with gb disease on us      Leukocytosis (2/16/2017)

## 2017-02-19 NOTE — PROGRESS NOTES
Spoke to pt nurse @ 2050 and informed her that I have ordered oral contrast in patient's chart.  Also informed her of mixing instructions and to call [phone] when pt had completed contrast.

## 2017-02-19 NOTE — CONSULTS
HOSPITALIST H&P/CONSULT  NAME:  Jeanne Saini   Age:  80 y.o.  :   1934   MRN:   [id number]  PCP: Caroline Mata MD  Consulting MD:  Treatment Team: Attending Provider: Mauri Cowden, MD; Consulting Provider: Mauri Cowden, MD; Utilization Review: Jacquelyne Homans, RN; Consulting Provider: Lisandra Curtis MD; Care Manager: Billy Matute, Laura Han; Consulting Provider: Lelo Trinh MD  HPI:   Patient presented with rt sided chest pain which has progressed to include back and leg pains. Troponin neg on admission. Nuclear stress was unable to be completed as pt unable to lay still. Noted to have a fever overnight of 101 with leukocytosis this am.  Started on tamiflu as pt with reported rigors overnight as well. Resting comfortably currently    2017- pt seen complains of pain all over and to rt shoulder. Currently afebrile. Difficult historian.    2017- pt still with low grade fevers this am but deffervescing       2017- pt seen febrile again this am- with abdominal pain. Cultures done abx broadened. Ct abd pelvis done which showed suspicion for acute cholecystitis. Gen surgery consult pending. Pt also hiccuping    Complete ROS done and is as stated in HPI or otherwise negative  Past Medical History   Diagnosis Date    CAD (coronary artery disease)     Diabetes ([facility] [address]     Hypertension     Neurological disorder      parkinson    Psychiatric disorder       Past Surgical History   Procedure Laterality Date    Pr cardiac surg procedure unlist        Prior to Admission Medications   Prescriptions Last Dose Informant Patient Reported? Taking? ALPRAZolam (XANAX) 1 mg tablet   Yes Yes   Sig: Take 1 mg by mouth nightly. acetaminophen (TYLENOL) 325 mg tablet   Yes Yes   Sig: Take 500 mg by mouth every four (4) hours as needed for Pain. aspirin delayed-release 81 mg tablet   Yes Yes   Sig: Take 325 mg by mouth daily.    atorvastatin (LIPITOR) 80 mg tablet Yes Yes   Sig: Take 40 mg by mouth nightly. calcium-cholecalciferol, d3, (CALCIUM 600 + D) 600-125 mg-unit tab   Yes Yes   Sig: Take 1 Tab by mouth daily. carbidopa-levodopa (SINEMET)  mg per tablet   Yes Yes   Sig: Take 1 Tab by mouth three (3) times daily. cholecalciferol, VITAMIN D3, (VITAMIN D3) 5,000 unit tab tablet   Yes Yes   Sig: Take 5,000 Units by mouth daily. clopidogrel (PLAVIX) 75 mg tablet   Yes Yes   Sig: Take 75 mg by mouth daily. insulin NPH/insulin regular (NOVOLIN 70/30) 100 unit/mL (70-30) injection   Yes Yes   Si Units by SubCUTAneous route once.   magnesium oxide (MAG-OX) 400 mg tablet   Yes Yes   Sig: Take 400 mg by mouth daily. memantine ER (NAMENDA XR) 28 mg capsule   Yes Yes   Sig: Take  by mouth daily. metFORMIN (GLUCOPHAGE) 500 mg tablet   Yes Yes   Sig: Take 500 mg by mouth two (2) times daily (with meals). midodrine (PROAMITINE) 5 mg tablet   Yes Yes   Sig: Take 5 mg by mouth three (3) times daily. nicotine (NICORETTE) 2 mg gum   Yes Yes   Sig: Take 2 mg by mouth every three (3) hours as needed for Smoking Cessation. nitroglycerin (NITROSTAT) 0.4 mg SL tablet   Yes Yes   Si.4 mg by SubLINGual route every five (5) minutes as needed for Chest Pain. omeprazole (PRILOSEC) 20 mg capsule   Yes Yes   Sig: Take 20 mg by mouth two (2) times a day. polyethylene glycol (MIRALAX) 17 gram/dose powder   Yes Yes   Sig: Take 17 g by mouth every other day. promethazine (PHENERGAN) 25 mg tablet   Yes Yes   Sig: Take 25 mg by mouth every six (6) hours as needed for Nausea. sennosides 8.6 mg cap   Yes Yes   Sig: Take 8.6 mg by mouth two (2) times a day. tamsulosin (FLOMAX) 0.4 mg capsule   Yes Yes   Sig: Take 0.4 mg by mouth daily. therapeutic multivitamin (THERA) tablet   Yes Yes   Sig: Take 1 Tab by mouth daily. traMADol (ULTRAM) 50 mg tablet   Yes Yes   Sig: Take 50 mg by mouth nightly.  Take 2 tabs by mouth at bedtime      Facility-Administered Medications: None     Allergies   Allergen Reactions    Naprosyn [Naproxen] Other (comments)    Pravastatin Other (comments)      Social History   Substance Use Topics    Smoking status: Never Smoker    Smokeless tobacco: Current User    Alcohol use No      No family history on file. Objective:     Visit Vitals    /73 (BP 1 Location: Left arm, BP Patient Position: At rest)    Pulse 92    Temp 100.1 °F (37.8 °C)    Resp 18    Ht 5' 9\" (1.753 m)    Wt 78.8 kg (173 lb 11.2 oz)    SpO2 90%    BMI 25.65 kg/m2      Temp (24hrs), Av.2 °F (37.3 °C), Min:98.3 °F (36.8 °C), Max:100.7 °F (38.2 °C)    Oxygen Therapy  O2 Sat (%): 90 % (17 1106)  Pulse via Oximetry: 77 beats per minute (02/15/17 1421)  O2 Device: Room air (17 0121)  Physical Exam:  General:    No acute distress    Head:   Normocephalic, without obvious abnormality, atraumatic. Nose:  Nares normal. No drainage or sinus tenderness. Lungs:   Coarse bs bilat  Heart:   Regular rate and rhythm,  no murmur, rub or gallop. Abdomen:   Soft, non-tender. Not distended. Bowel sounds normal.   Extremities: No cyanosis. No edema. No clubbing  Skin:     Texture, turgor normal. No rashes or lesions.   Not Jaundiced    Data Review:   Recent Results (from the past 24 hour(s))   GLUCOSE, POC    Collection Time: 17 12:10 PM   Result Value Ref Range    Glucose (POC) 159 (H) 65 - 100 mg/dL   GLUCOSE, POC    Collection Time: 17  4:48 PM   Result Value Ref Range    Glucose (POC) 162 (H) 65 - 100 mg/dL   GLUCOSE, POC    Collection Time: 17  9:22 PM   Result Value Ref Range    Glucose (POC) 233 (H) 65 - 100 mg/dL   CULTURE, BLOOD    Collection Time: 17  9:45 PM   Result Value Ref Range    Special Requests: RIGHT HAND      Culture result: NO GROWTH AFTER 7 HOURS     GLUCOSE, POC    Collection Time: 17 11:32 PM   Result Value Ref Range    Glucose (POC) 171 (H) 65 - 100 mg/dL   CBC WITH AUTOMATED DIFF    Collection Time: 02/19/17  3:55 AM   Result Value Ref Range    WBC 9.2 4.3 - 11.1 K/uL    RBC 2.94 (L) 4.23 - 5.67 M/uL    HGB 9.6 (L) 13.6 - 17.2 g/dL    HCT 28.6 (L) 41.1 - 50.3 %    MCV 97.3 79.6 - 97.8 FL    MCH 32.7 26.1 - 32.9 PG    MCHC 33.6 31.4 - 35.0 g/dL    RDW 12.7 11.9 - 14.6 %    PLATELET 910 (L) 959 - 450 K/uL    MPV 10.7 (L) 10.8 - 14.1 FL    DF AUTOMATED      NEUTROPHILS 71 43 - 78 %    LYMPHOCYTES 17 13 - 44 %    MONOCYTES 10 4.0 - 12.0 %    EOSINOPHILS 2 0.5 - 7.8 %    BASOPHILS 0 0.0 - 2.0 %    IMMATURE GRANULOCYTES 0.3 0.0 - 5.0 %    ABS. NEUTROPHILS 6.5 1.7 - 8.2 K/UL    ABS. LYMPHOCYTES 1.5 0.5 - 4.6 K/UL    ABS. MONOCYTES 1.0 0.1 - 1.3 K/UL    ABS. EOSINOPHILS 0.1 0.0 - 0.8 K/UL    ABS. BASOPHILS 0.0 0.0 - 0.2 K/UL    ABS. IMM. GRANS. 0.0 0.0 - 0.5 K/UL   METABOLIC PANEL, BASIC    Collection Time: 02/19/17  3:55 AM   Result Value Ref Range    Sodium 140 136 - 145 mmol/L    Potassium 3.6 3.5 - 5.1 mmol/L    Chloride 106 98 - 107 mmol/L    CO2 24 21 - 32 mmol/L    Anion gap 10 7 - 16 mmol/L    Glucose 139 (H) 65 - 100 mg/dL    BUN 32 (H) 8 - 23 MG/DL    Creatinine 1.59 (H) 0.8 - 1.5 MG/DL    GFR est AA 54 (L) >60 ml/min/1.73m2    GFR est non-AA 45 (L) >60 ml/min/1.73m2    Calcium 8.0 (L) 8.3 - 10.4 MG/DL   HEPATIC FUNCTION PANEL    Collection Time: 02/19/17  3:55 AM   Result Value Ref Range    Protein, total 6.1 (L) 6.3 - 8.2 g/dL    Albumin 2.2 (L) 3.2 - 4.6 g/dL    Globulin 3.9 (H) 2.3 - 3.5 g/dL    A-G Ratio 0.6 (L) 1.2 - 3.5      Bilirubin, total 0.9 0.2 - 1.1 MG/DL    Bilirubin, direct 0.3 <0.4 MG/DL    Alk. phosphatase 204 (H) 50 - 136 U/L    AST (SGOT) 77 (H) 15 - 37 U/L    ALT (SGPT) 13 12 - 65 U/L   GLUCOSE, POC    Collection Time: 02/19/17  6:44 AM   Result Value Ref Range    Glucose (POC) 110 (H) 65 - 100 mg/dL   GLUCOSE, POC    Collection Time: 02/19/17 11:11 AM   Result Value Ref Range    Glucose (POC) 127 (H) 65 - 100 mg/dL     Imaging /Procedures /Studies     Assessment and Plan:      Active Hospital Problems    Diagnosis Date Noted    Chest pain 02/16/2017    Fever 02/16/2017    Leukocytosis 02/16/2017    Debility 04/11/2015    CAD (coronary artery disease) 07/21/2014    Orthostatic hypotension 07/21/2014       PLAN  · Continue tamiflu  · Continue bs abx  · ? Cholecystitis vs ascending cholangitis- gen surgery consulted. abx broadened.  lfts pending- may also need GI  · Abdominal pain- pain mgt with morphine  · Follow up cultures  · Rt shoulder x-ray for the severe pain- showed djd- continue pain mgt  · Will follow    Signed By: Fernando Perales MD     February 19, 2017

## 2017-02-19 NOTE — PROGRESS NOTES
Bedside and Verbal shift change report given to García King RN (oncoming nurse) by Augustin Machuca RN (offgoing nurse). Report included the following information SBAR, Kardex, MAR and Recent Results.

## 2017-02-19 NOTE — PROGRESS NOTES
Dr. Osmar Rosado notified of Temp-101 and unable to get patient to take oral tylenol due to drowsiness. MD orders tylenol suppositories. Informed MD of concerns about getting patient to swallow PM meds. MD has viewed scheduled meds and states if patient will not be awake enough to swallow then it will ok for now. No other orders.

## 2017-02-19 NOTE — PROGRESS NOTES
Attempted 2x to place an IV. Patient appears in extreme pain and warm to touch. Pt states he does not feel good. Assessment performed and temperature 100.8 F. Tylenol administered. Clinical Coordinator, Merna Stevens, at bedside to place IV and reassess patients condition. Noted decreased bowel sounds. Called on call hospitalist. Received new orders for antibiotics and stat CT abdomen. Will continue to monitor.

## 2017-02-19 NOTE — PROGRESS NOTES
Bedside and Verbal shift change report given to Milo Norris RN  (oncoming nurse) by García King RN (offgoing nurse). Report included the following information SBAR, Kardex, MAR and Recent Results.

## 2017-02-19 NOTE — PROGRESS NOTES
Patient still with fever 100.8, appears ill. Pt noted to be moaning in pain and guarding abdomen. Decreased bowel sounds noted. After swallowing tylenol, patient noted to scream out with pain. When asked where he hurts, he moans \"belly\". Abdomen does not appear to be distended at this time, however is tender with palpation. MD, Fransico Koyanagi paged and updated on patient condition. States he will review chart and place orders.

## 2017-02-19 NOTE — PROGRESS NOTES
TRANSFER - OUT REPORT:    Verbal report given to Ana Humphrey RN(name) on Jamie Armenta  being transferred to [facility](unit) for routine progression of care       Report consisted of patients Situation, Background, Assessment and   Recommendations(SBAR). Information from the following report(s) SBAR, Kardex, [facility] and Recent Results was reviewed with the receiving nurse. Opportunity for questions and clarification was provided.       Patient transported with:   Monitor

## 2017-02-19 NOTE — PROGRESS NOTES
Called and spoke with Carlos Eduardo Murray in Radiology to notify of Stat Ultrasound,  States she is calling in the ultrasound tech.

## 2017-02-19 NOTE — PROGRESS NOTES
Called and notified Dr Alexys Moreno of CT result, pt continues to moan in pain. VO for Stat RUQ ultrasound to r/o cholecystitis.

## 2017-02-19 NOTE — PROGRESS NOTES
Pharmacokinetic Consult to Pharmacist    Elyssa Leyva is a 80 y.o. male being treated for FUO with vancomycin and merrem. Height: 5' 9\" (175.3 cm)  Weight: 78.8 kg (173 lb 11.2 oz)  Lab Results   Component Value Date/Time    BUN 32 02/19/2017 03:55 AM    Creatinine 1.59 02/19/2017 03:55 AM    WBC 9.2 02/19/2017 03:55 AM      Estimated Creatinine Clearance: 35.8 mL/min (based on Cr of 1.59). Lab Results   Component Value Date/Time    Vancomycin,trough 11.1 02/19/2017 04:50 PM       Day 4 of vancomycin. Goal trough is 15-20. Trough = 11.1. Change interval to q18h. Same 1.25g dose. Will continue to follow patient. Thank you,  Franci Colin, Pharm. D.   Clinical Pharmacist  [phone]

## 2017-02-19 NOTE — PROGRESS NOTES
Patient pulled out peripheral IV. Both Orestes Fuchs RN and I tried to restart without success. Courtney Callahan RN will try for a third time to start.

## 2017-02-20 PROBLEM — R10.11 RUQ ABDOMINAL PAIN: Status: ACTIVE | Noted: 2017-01-01

## 2017-02-20 NOTE — ROUTINE PROCESS
Spoke with Jl Owusu from Nuclear Medicine. Discussed pt's last PO intake. Unsure of time so pt will be pretreated.   Awaiting transport

## 2017-02-20 NOTE — PROGRESS NOTES
Patient reevaluated. Still with hiccups. Does not follow commands, will not open eyes to command. Temp up to 101 around lunch. VSS. Abdomen completely benign on palpation. Continue serial exams, abx. I remain unconvinced that his gallbladder is driving this picture.   Will follow  Yamilet Sanchez MD

## 2017-02-20 NOTE — PROGRESS NOTES
Assessment completed as charted. At present, patient is sleeping and is not having hiccups. VSS. On remote telemetry. Incontinent in briefs. Call light in reach.

## 2017-02-20 NOTE — ROUTINE PROCESS
Pt remains very lethargic. When chest rubbed, will respond with eyes slightly opening and will not keep them open.   Held all PO medications

## 2017-02-20 NOTE — PROGRESS NOTES
Patient states that the hiccups is the only problem that he has at this time. Have administered IM thorazine.

## 2017-02-20 NOTE — PROGRESS NOTES
Bharat Garcia M.D. Colon and Rectal Surgeon  [address], [address] [place] [address], [facility] Siria Blancas   Phone: [phone] Fax: [phone]      Amado Keller  [id number]    SUBJECTIVE:  Amado Keller is a 80y.o. year old male who I was asked to see regarding possible cholecystitis. On my initial evaluation, he was lethargic because of a recent pain medication administration, and history came from the chart and his daughter in the room. The patient developed chest pain on Wednesday of the week prior to admission. He came to the hospital and was admitted. He has been worked up from a cardiac standpoint and this was negative. Pain progressively moved from his chest, then into the center of his back, and then into his right shoulder. Daughter stated he has had abdominal pain for the last ~24 hours. No n/v. He has had fevers up to 101.7 and leukocytosis of 17 (2/16) in the hospital. Hospitalist saw the patient and started Tamiflu due to fevers, rigors, and diffuse body aches. Due to persistent symptoms, CT and Ultrasound were done. US showed no gallstones but some gallbladder wall thickening. This morning, he is slightly more responsive. Denies abdominal pain, but that is all I can get out of him. No hiccups this AM     ROS: unobtainable due to mental status    PHYSICAL EXAM:   Patient Vitals for the past 24 hrs:   BP Temp Pulse Resp SpO2   02/20/17 0548 - 100 °F (37.8 °C) - - -   02/20/17 0510 148/71 (!) 101.2 °F (38.4 °C) 89 18 93 %   02/19/17 2306 155/66 99.2 °F (37.3 °C) 87 18 93 %   02/19/17 1910 121/68 99 °F (37.2 °C) 86 18 -   02/19/17 1149 - (!) 101 °F (38.3 °C) - - -   02/19/17 1106 115/73 100.1 °F (37.8 °C) 92 18 90 %   02/19/17 0804 142/74 - 87 - -   02/19/17 0716 105/61 99.6 °F (37.6 °C) 80 20 92 %       General--lethargic, tries to open eyes to command. Barely answers my one question regarding abdominal pain  Abdomen--soft, nontender, nondistended.   No peritoneal signs, no rebound, no guarding. Completely benign abdomen again today. UOP: 3/3  BM: nr    Recent Labs      02/19/17   0355  02/18/17   0342  02/17/17   1045   WBC  9.2  10.0  11.6*   HGB  9.6*  10.8*  12.4*   HCT  28.6*  31.9*  35.8*   PLT  112*  122*  136*       Recent Labs      02/19/17   0355  02/18/17   0342  02/17/17   1045   NA  140  139  138   K  3.6  3.6  4.6   CL  106  103  101   CO2  24  23  23   BUN  32*  35*  34*   CREA  1.59*  1.72*  1.91*   TBILI  0.9   --    --    SGOT  77*   --    --    ALT  13   --    --    AP  204*   --    --    MG   --   2.2   --        UA with 30 protein, trace ketones, small bili, no LE or nitrites, bact negative  Trending troponins negative  Lipids normal  Urine cx 2/16/17--neg x2 days  Blood cx 2/16/17--neg x3 days  Blood cx 2/18/17--neg x 7 hrs  Blood cx 2/19/17--pending  Flu negative  Above labs reviewed by me. Echo 2/16/17--\"SUMMARY:  Left ventricle: Systolic function was at the lower limits of normal. Ejection fraction was estimated in the range of 50 % to 55 %. There were no regional wall motion abnormalities. There was mild concentric hypertrophy.  Right ventricle: There was mild pulmonary artery hypertension. Right atrium: The atrium was mildly dilated. There was a possible, large, spherical, solid, fixed mass in the atrial cavity. The possibility that this Is a tumor cannot be excluded.  Mitral valve: There was mild regurgitation. \"     IMAGING: Images reviewed by me. 2/15/17--CT angio: \"IMPRESSION:  No acute arterial findings. However, there is extensive coronary artery disease present.  Extensive bilateral pulmonary parenchymal disease, worse within the right lung. \"  2/18/17--CT abd/pelvis: \"IMPRESSION: There is evidence of mild gallbladder wall thickening with new fluid/stranding in the adjacent right upper quadrant, this is nonspecific but given history of abdominal pain and fever raises concern for cholecystitis.  Consider further evaluation with right upper quadrant ultrasound if clinically warranted.   New small right pleural effusion with persistent parenchymal disease in the visualized lung bases. \"  2/19/17--RUQ US: \"The pancreas is obscured and not well evaluated due to bowel gas. The liver displays normal echogenicity. No evidence of focal disease. No intrahepatic biliary ductal dilatation. Small amount of perihepatic fluid. The juxtahepatic IVC is unremarkable. The visualized abdominal aorta is of normal caliber.   The gallbladder demonstrates edematous wall thickening measuring up to 6 mm in thickness. No shadowing stones identified. Sonographic Higgins's sign is reported as negative. The common duct measures 4 mm.   The right kidney measures 9.7 cm longitudinally. No hydronephrosis. IMPRESSION: Edematous appearing gallbladder wall thickening is nonspecific without evidence of cholelithiasis and reportedly negative sonographic Higgins's sign. Cholecystitis cannot be completely excluded, other possible etiology include but are not limited to volume overload, hepatic failure, or renal failure. \"     ASSESSMENT:   Abdominal pain, by history only, none on exam today  Anemia, dilutional and phlebotomy  Thrombocytopenia  Leukocytosis--resolved  Mildly elevated LFTs  JIM--improved    PLAN:  --His abdominal exam remains unimpressive. This may be related to his sedation, but he has no guarding on palpation and his abdomen is completely soft and benign. Additionally, he has no stones to explain an acute cholecystitis. He has some gallbladder wall thickening, which could be acute but could also be chronic. He has very mildly elevated LFTs, which is nonspecific. He may have had a stone that passed (?)  --I recommend continued conservative therapy at this time. Serial abdominal exams  --May consider HIDA to assess patency of cystic duct/acute cholecystitis  --I have stopped plavix in case operative intervention is needed.   He will need to be off this 5 days before any operation can be done.   --will follow  Michele Salcedo MD

## 2017-02-20 NOTE — PROGRESS NOTES
[facility] CARDIOLOGY PROGRESS NOTE    2/20/2017 8:16 AM    Admit Date: 2/15/2017    Admit Diagnosis: chest pain;Leukocytosis      Subjective:   Somnolent but hemodynamically stable. Febrile once again to 101.2 overnight. Objective:      Vitals:    02/19/17 2306 02/20/17 0510 02/20/17 0548 02/20/17 0755   BP: 155/66 148/71  126/64   Pulse: 87 89  86   Resp: 18 18 19   Temp: 99.2 °F (37.3 °C) (!) 101.2 °F (38.4 °C) 100 °F (37.8 °C) 100 °F (37.8 °C)   SpO2: 93% 93%  90%   Weight:       Height:           Physical Exam:  Neck- supple, no JVD  CV- regular rate and rhythm no MRG  Lung- clear bilaterally  Abd- soft, nontender, nondistended  Ext- no edema  Skin- warm and dry    Data Review:   Recent Labs      02/19/17   0355  02/18/17   0342   NA  140  139   K  3.6  3.6   MG   --   2.2   BUN  32*  35*   CREA  1.59*  1.72*   GLU  139*  105*   WBC  9.2  10.0   HGB  9.6*  10.8*   HCT  28.6*  31.9*   PLT  112*  122*       Assessment and Plan:     Principal Problem:    Chest pain (2/16/2017)- resolved, negative cardiac evaluation with negative nuclear stress test, normal EF. Moderate risk for nuno if needed, control BP/HR/pain periop. We will be on standby as needed. Active Problems:    Orthostatic hypotension - stable, in bed, reassess in outpatient setting, stay hydrated. CAD (coronary artery disease- negative ischemic workup, no further angina, presenting symptoms due to gallbladder most likely and not cardiac in nature. Debility - rehab after discharge      Fever- recurrent to 101.2 overnight, due to gallbladder most likely- continue ABX, surgery evaluation      Leukocytosis - see above     Somnolence- arousable but to sternal rub, had xanax at bedtime, thorazine at midnight and norco at 5am.  HOLD all sedative/hypnotics/narcotics for now, allow him to wake up again. We will be on standby and follow from a distance.   Please call if any more questions or further assistance from a CV standpoint needed. Thanks to hospitalists for assuming care of this patient.          Jorge A Hernandez MD  [facility]  Pager [phone]

## 2017-02-20 NOTE — PROGRESS NOTES
END OF SHIFT NOTE:    INTAKE/OUTPUT  02/18 0701 - 02/19 0700  In: -   Out: 400 [Urine:400]  Voiding: YES  Catheter: NO  Drain:              Flatus: Patient does have flatus present. Stool:  0 occurrences. Characteristics:       Emesis: 0 occurrences. Characteristics:        VITAL SIGNS  Patient Vitals for the past 12 hrs:   Temp Pulse Resp BP SpO2   02/19/17 1149 (!) 101 °F (38.3 °C) - - - -   02/19/17 1106 100.1 °F (37.8 °C) 92 18 115/73 90 %   02/19/17 0804 - 87 - 142/74 -       Pain Assessment  Pain Intensity 1: 0 (02/19/17 1718)  Pain Location 1: Abdomen  Pain Intervention(s) 1: Medication (see MAR)  Patient Stated Pain Goal: 0    Ambulating  No    Shift report given to oncoming nurse at the bedside.     Elvira Kaplan RN

## 2017-02-20 NOTE — PROGRESS NOTES
END OF SHIFT NOTE:    INTAKE/OUTPUT  02/19 0701 - 02/20 0700  In: 2682 [P.O.:180; I.V.:2502]  Out: -   Voiding: YES  Catheter: NO  Drain:      Incontinent briefs. Flatus: Patient does not have flatus present. Stool:  0 occurrences. Characteristics:       Emesis: 0 occurrences. Characteristics:        VITAL SIGNS  Patient Vitals for the past 12 hrs:   Temp Pulse Resp BP SpO2   02/20/17 0548 100 °F (37.8 °C) - - - -   02/20/17 0510 (!) 101.2 °F (38.4 °C) 89 18 148/71 93 %   02/19/17 2306 99.2 °F (37.3 °C) 87 18 155/66 93 %   02/19/17 1910 99 °F (37.2 °C) 86 18 121/68 -       Pain Assessment  Pain Intensity 1: 6 (02/20/17 6627)  Pain Location 1: Chest  Pain Intervention(s) 1: Medication (see MAR)  Patient Stated Pain Goal: 0    Ambulating  No    Shift report given to oncoming nurse at the bedside.     Joselyn Jones RN

## 2017-02-20 NOTE — PROGRESS NOTES
At 5:10 patient spiked a temp at 101.2. Patient was sleeping and without hiccups. Norco given and at 5:48 temp is at 100.0. Resting quietly.

## 2017-02-20 NOTE — PROGRESS NOTES
Pt continues to remain drowsy but will occasionally open his eyes. He did take his evening medications. All sedating medications to be held.  Family at [facility]

## 2017-02-20 NOTE — PROGRESS NOTES
Hospitalist Progress Note    2017  Admit Date: 2/15/2017  8:09 AM   NAME: Jessica Coreas   :  1934   MRN:  [id number]   Attending: Chris Jenkins MD  PCP:  Dede Moss MD    SUBJECTIVE:     Jessica Coreas is a 82yoM admitted to [facility] on  with CP, back pain. Trop neg. Pain progressed to L shoulder then to abdominal. Has been having fevers since admission. Worsening mental status over last 2 days. Patient without cardiac issues so transferred the the Hospitalist service yesterday. Fever of 101.2 overnight. Daughter at bedside reports that he has had hiccups for 2 days. Still with abd pain. Opens eyes to sternal rub. Cannot complete ROS  AMS      PHYSICAL EXAM       Visit Vitals    /65    Pulse 87    Temp (!) 100.5 °F (38.1 °C)    Resp 20    Ht 5' 9\" (1.753 m)    Wt 78.8 kg (173 lb 11.2 oz)    SpO2 90%    BMI 25.65 kg/m2      Temp (24hrs), Av °F (37.8 °C), Min:99 °F (37.2 °C), Max:101.2 °F (38.4 °C)    Oxygen Therapy  O2 Sat (%): 90 % (17 1100)  Pulse via Oximetry: 77 beats per minute (02/15/17 1421)  O2 Device: Room air (17 0835)    Intake/Output Summary (Last 24 hours) at 17 1228  Last data filed at 17 0755   Gross per 24 hour   Intake             2976 ml   Output                0 ml   Net             2976 ml      General: Obtunded, opens eyes slightly to sternal rub, not answering questions  Head:  Atraumatic Normocephalic. Lungs:  CTA Bilaterally. Normal resp effort  CVS:  RRR, no BLE edema  Abdomen: Soft, TTP RUQ and epigastric, no rebound or guarding, +NABS  MSK:  Spontaneously moves extremities.   Neurologic:  Unable to assess      Recent Results (from the past 24 hour(s))   GLUCOSE, POC    Collection Time: 17  4:37 PM   Result Value Ref Range    Glucose (POC) 116 (H) 65 - 100 mg/dL   VANCOMYCIN, TROUGH    Collection Time: 17  4:50 PM   Result Value Ref Range    Vancomycin,trough 11.1 5 - 20 ug/mL   GLUCOSE, POC Collection Time: 02/19/17  9:06 PM   Result Value Ref Range    Glucose (POC) 151 (H) 65 - 100 mg/dL   GLUCOSE, POC    Collection Time: 02/20/17  5:42 AM   Result Value Ref Range    Glucose (POC) 162 (H) 65 - 100 mg/dL   CBC WITH AUTOMATED DIFF    Collection Time: 02/20/17  9:41 AM   Result Value Ref Range    WBC 9.2 4.3 - 11.1 K/uL    RBC 3.25 (L) 4.23 - 5.67 M/uL    HGB 10.6 (L) 13.6 - 17.2 g/dL    HCT 31.1 (L) 41.1 - 50.3 %    MCV 95.7 79.6 - 97.8 FL    MCH 32.6 26.1 - 32.9 PG    MCHC 34.1 31.4 - 35.0 g/dL    RDW 13.0 11.9 - 14.6 %    PLATELET 808 (L) 664 - 450 K/uL    MPV 10.8 10.8 - 14.1 FL    DF AUTOMATED      NEUTROPHILS 65 43 - 78 %    LYMPHOCYTES 20 13 - 44 %    MONOCYTES 12 4.0 - 12.0 %    EOSINOPHILS 2 0.5 - 7.8 %    BASOPHILS 0 0.0 - 2.0 %    IMMATURE GRANULOCYTES 0.7 0.0 - 5.0 %    ABS. NEUTROPHILS 6.1 1.7 - 8.2 K/UL    ABS. LYMPHOCYTES 1.8 0.5 - 4.6 K/UL    ABS. MONOCYTES 1.1 0.1 - 1.3 K/UL    ABS. EOSINOPHILS 0.2 0.0 - 0.8 K/UL    ABS. BASOPHILS 0.0 0.0 - 0.2 K/UL    ABS. IMM. GRANS. 0.1 0.0 - 0.5 K/UL   METABOLIC PANEL, COMPREHENSIVE    Collection Time: 02/20/17  9:41 AM   Result Value Ref Range    Sodium 142 136 - 145 mmol/L    Potassium 3.7 3.5 - 5.1 mmol/L    Chloride 108 (H) 98 - 107 mmol/L    CO2 21 21 - 32 mmol/L    Anion gap 13 7 - 16 mmol/L    Glucose 132 (H) 65 - 100 mg/dL    BUN 30 (H) 8 - 23 MG/DL    Creatinine 1.52 (H) 0.8 - 1.5 MG/DL    GFR est AA 57 (L) >60 ml/min/1.73m2    GFR est non-AA 47 (L) >60 ml/min/1.73m2    Calcium 7.9 (L) 8.3 - 10.4 MG/DL    Bilirubin, total 1.3 (H) 0.2 - 1.1 MG/DL    ALT (SGPT) 26 12 - 65 U/L    AST (SGOT) 56 (H) 15 - 37 U/L    Alk.  phosphatase 272 (H) 50 - 136 U/L    Protein, total 6.5 6.3 - 8.2 g/dL    Albumin 2.1 (L) 3.2 - 4.6 g/dL    Globulin 4.4 (H) 2.3 - 3.5 g/dL    A-G Ratio 0.5 (L) 1.2 - 3.5     PROCALCITONIN    Collection Time: 02/20/17  9:41 AM   Result Value Ref Range    Procalcitonin 1.0 ng/mL   GLUCOSE, POC    Collection Time: 02/20/17 11:06 AM Result Value Ref Range    Glucose (POC) 146 (H) 65 - 100 mg/dL         Imaging /Procedures /Studies   XR CHEST SNGL V   Final Result   Impression: Shallow inspiratory result with stable bilateral interstitial   opacities suggesting underlying chronic change. US ABD LTD   Final Result   IMPRESSION:   Edematous appearing gallbladder wall thickening is nonspecific without evidence   of cholelithiasis and reportedly negative sonographic Higgins's sign. Cholecystitis cannot be completely excluded, other possible etiology include but   are not limited to volume overload, hepatic failure, or renal failure. CT ABD PELV WO CONT   Final Result   IMPRESSION:    There is evidence of mild gallbladder wall thickening with new fluid/stranding   in the adjacent right upper quadrant, this is nonspecific but given history of   abdominal pain and fever raises concern for cholecystitis. Consider further   evaluation with right upper quadrant ultrasound if clinically warranted. New small right pleural effusion with persistent parenchymal disease in the   visualized lung bases. XR SHOULDER RT AP/LAT MIN 2 V   Final Result   IMPRESSION: No acute abnormality. DJD. Shayla Jerry CTA CHEST ABD PELV W WO CONT   Final Result   IMPRESSION:      No acute arterial findings. However, there is extensive coronary artery disease   present. Extensive bilateral pulmonary parenchymal disease, worse within the right lung. XR CHEST PORT   Final Result   IMPRESSION: Underexpanded lungs with chronic interstitial marking prominence may   represent interstitial lung disease.  Otherwise, no acute abnormality            ASSESSMENT      Hospital Problems as of 2/20/2017  Date Reviewed: 1/19/2017          Codes Class Noted - Resolved POA    RUQ abdominal pain ICD-10-CM: R10.11  ICD-9-CM: 789.01  2/20/2017 - Present No        * (Principal)Chest pain ICD-10-CM: R07.9  ICD-9-CM: 786.50  2/16/2017 - Present Unknown        Fever ICD-10-CM: R50.9  ICD-9-CM: 780.60  2/16/2017 - Present Unknown        Leukocytosis ICD-10-CM: D72.829  ICD-9-CM: 288.60  2/16/2017 - Present Unknown        Debility (Chronic) ICD-10-CM: R53.81  ICD-9-CM: 799.3  4/11/2015 - Present Yes        Acute encephalopathy ICD-10-CM: G93.40  ICD-9-CM: 348.30  4/11/2015 - Present Yes        Orthostatic hypotension (Chronic) ICD-10-CM: I95.1  ICD-9-CM: 458.0  7/21/2014 - Present Yes        CAD (coronary artery disease) (Chronic) ICD-10-CM: I25.10  ICD-9-CM: 414.00  7/21/2014 - Present Yes        Diabetes mellitus, type II, insulin dependent (HCC) (Chronic) ICD-10-CM: E11.9, Z79.4  ICD-9-CM: 250.00, V58.67  7/21/2014 - Present Yes        CKD (chronic kidney disease) stage 3, GFR 30-59 ml/min (Chronic) ICD-10-CM: N18.3  ICD-9-CM: 585.3  7/21/2014 - Present Yes    Overview Signed 7/21/2014 10:34 PM by Su Mulligan Cr~1.9mg/dl                       Plan:  - Encephalopathy: hold all sedating meds, including pain medications and thorazine that he has been receiving for hiccups    - Persistent fever/leukocytosis: CXR neg, UA clean. Cx neg to date. Given abd pain and findings on CT abd/pelvis without contrast; suspect that gallbladder could be culprit. [facility] If negative, will need to discuss with ID. Continue vanc/merem. Day 5 of abx. Will check lipase today.     - Orthostatic hypotension: continue home midodrine    - DM2: SSI ACHS    - CKD: Cr improved back to baseline 1.5-1.7    DVT Prophylaxis: Hep SQ    Krista Araya MD

## 2017-02-20 NOTE — PROGRESS NOTES
PT note:    RN requested to hold PT this date secondary to pt being very sleepy. Will check back on pt as time allows.     Vilma Landeros, PTA

## 2017-02-21 PROBLEM — D69.6 THROMBOCYTOPENIA (HCC): Status: ACTIVE | Noted: 2017-01-01

## 2017-02-21 NOTE — PROGRESS NOTES
Patient refused to take his PM meds, he is still very drowsy but when I asked if he could swallow some pills he said \"no\". I even offered to crush them and put them in apply sauce but he still said \"no\".

## 2017-02-21 NOTE — PROGRESS NOTES
Problem: Mobility Impaired (Adult and Pediatric)  Goal: *Acute Goals and Plan of Care (Insert Text)  LTG:  (1.)Mr. Ej Broussard will move from supine to sit and sit to supine , scoot up and down and roll side to side with MODIFIED INDEPENDENCE within 7 day(s). (2.)Mr. Ej Broussard will transfer from bed to chair and chair to bed with CONTACT GUARD ASSIST using the least restrictive device within 7 day(s). (3.)Mr. Ej Broussard will ambulate with CONTACT GUARD ASSIST for 5 feet with the least restrictive device within 7 day(s). (4.)Mr. Ej Broussard will propel manual wheelchair with B hands and feet for 150 within 7 days for increased mobility and strength.   _____________________________________________________________________________________________      PHYSICAL THERAPY: Daily Note, Treatment Day: 1st and AM 2/21/2017  INPATIENT: Hospital Day: 7  Payor: Lupillo Wong / Plan: [address] [place] [address] HMO / Product Type: [employer] /      NAME/AGE/GENDER: Hannah Adame is a 80 y.o. male       PRIMARY DIAGNOSIS: Cholecystitis [K81.9] Chest pain Chest pain  Procedure(s) (LRB):  CHOLECYSTECTOMY LAPAROSCOPIC WITH POSS INTRAOP CHOLANGIOGRAM  ROOM 201 (N/A)     ICD-10: Treatment Diagnosis:       · Generalized Muscle Weakness (M62.81)  · Difficulty in walking, Not elsewhere classified (R26.2)   Precaution/Allergies:  Naprosyn [naproxen] and Pravastatin       ASSESSMENT:      Mr. Ej Broussard presents lying supine in bed with son-in-law present. He opens his eyes upon speaking to him. He had difficulty staying awake. Patient sat at edge of bed with assistance ranging from CGA to max assist. He needed constant verbal cues to stay awake. He sat at edge of bed for approximately 30 minutes. He stood with min assist x 2 and was able to take several steps to head of bed. He showed improvement with activity tolerance this treatment. Will continue to advance plan of care as patient is able to tolerate.        This section established at most recent assessment   PROBLEM LIST (Impairments causing functional limitations):  1. Decreased Strength  2. Decreased ADL/Functional Activities  3. Decreased Transfer Abilities  4. Decreased Ambulation Ability/Technique  5. Decreased Balance  6. Decreased Activity Tolerance  7. Increased Fatigue  8. Decreased Cognition    INTERVENTIONS PLANNED: (Benefits and precautions of physical therapy have been discussed with the patient.)  1. Balance Exercise  2. Bed Mobility  3. Family Education  4. Gait Training  5. Home Exercise Program (HEP)  6. Therapeutic Activites  7. Therapeutic Exercise/Strengthening  8. Transfer Training  9. Group Therapy      TREATMENT PLAN: Frequency/Duration: 3 times a week for duration of hospital stay  Rehabilitation Potential For Stated Goals: GOOD      RECOMMENDED REHABILITATION/EQUIPMENT: (at time of discharge pending progress): Continue Skilled Therapy. HISTORY:   History of Present Injury/Illness (Reason for Referral):  Ivette Rodriguez is a 80 y.o. male who presents to the ER from [facility] with complaints of chest pain and back pain. He states chest pain started last night. Pain is sharp in nature and radiates to the right side of his chest. He denies any dyspnea, nausea or diaphoresis. He also complains of severe back pain that started this morning. He states the chest pain was no better with NTG. He states Morphine is the only thing that helped. He has known CAD. He states he has not had chest pain in quite some time. He has orthostatic hypotension and is on chronic midodrine. Past Medical History/Comorbidities:   Mr. Sana Thomson  has a past medical history of CAD (coronary artery disease); Diabetes ([address] Hypertension; Neurological disorder; and Psychiatric disorder.   Mr. Sana Thomson  has a past surgical history that includes cardiac surg procedure unlist.  Social History/Living Environment:   Home Environment: Long term care  # Steps to Enter: 0  One/Two Story Residence: [facility] story  Living Alone: No  Support Systems: Family member(s)  Patient Expects to be Discharged to[de-identified] Skilled nursing facility  Current DME Used/Available at Home: Wheelchair (mainly uses w/c; able to SPT with assist)  Tub or Shower Type: Shower  Prior Level of Function/Work/Activity:  Lives at [place] [facility], uses wheelchair for mobility, required assist for ADLs  Personal Factors:          Sex:  male        Age:  80 y.o. Number of Personal Factors/Comorbidities that affect the Plan of Care:  Age, dementia, CAD, DM 3+: HIGH COMPLEXITY   EXAMINATION:   Most Recent Physical Functioning:   Gross Assessment:                  Posture:  Posture (WDL): Exceptions to WDL  Posture Assessment: Forward head, Rounded shoulders  Balance:  Sitting: Impaired  Sitting - Static: Poor (constant support)  Sitting - Dynamic: Poor (constant support)  Standing: Impaired  Standing - Static: Fair (with support)  Standing - Dynamic : Fair (with support) Bed Mobility:  Supine to Sit: Maximum assistance  Sit to Supine: Minimum assistance  Scooting: Maximum assistance  Wheelchair Mobility:     Transfers:  Sit to Stand: Minimum assistance;Assist x2  Stand to Sit: Minimum assistance;Assist x2  Gait:     Base of Support: Widened  Speed/Deandra: Shuffled  Gait Abnormalities: Decreased step clearance;Shuffling gait  Distance (ft):  (a couple of steps to head of bed)  Assistive Device:  (handheld assist x 2)  Ambulation - Level of Assistance: Minimal assistance (x 2)  Interventions: Safety awareness training       Body Structures Involved:  1. Joints  2. Muscles  3. Ligaments Body Functions Affected:  1. Movement Related Activities and Participation Affected:  1. General Tasks and Demands  2. Mobility  3.  Self Care   Number of elements that affect the Plan of Care: 4+: HIGH COMPLEXITY   CLINICAL PRESENTATION:   Presentation: Stable and uncomplicated: LOW COMPLEXITY   CLINICAL DECISION MAKIN [facility] [address] [address] AM-PAC [address] [place] [address] Inpatient Short Form  How much difficulty does the patient currently have. .. Unable A Lot A Little None   1. Turning over in bed (including adjusting bedclothes, sheets and blankets)? [ ] 1   [ ] 2   [X] 3   [ ] 4   2. Sitting down on and standing up from a chair with arms ( e.g., wheelchair, bedside commode, etc.)   [ ] 1   [X] 2   [ ] 3   [ ] 4   3. Moving from lying on back to sitting on the side of the bed? [ ] 1   [X] 2   [ ] 3   [ ] 4   How much help from another person does the patient currently need. .. Total A Lot A Little None   4. Moving to and from a bed to a chair (including a wheelchair)? [ ] 1   [ ] 2   [X] 3   [ ] 4   5. Need to walk in hospital room? [ ] 1   [ ] 2   [X] 3   [ ] 4   6. Climbing 3-5 steps with a railing? [ ] 1   [X] 2   [ ] 3   [ ] 4   © 2007, Trustees of [address] [place] [address], under license to [employer]. All rights reserved    Score:  Initial: 15 Most Recent: X (Date: -- )     Interpretation of Tool:  Represents activities that are increasingly more difficult (i.e. Bed mobility, Transfers, Gait). Score 24 23 22-20 19-15 14-10 9-7 6       Modifier CH CI CJ CK CL CM CN         · Mobility - Walking and Moving Around:               - CURRENT STATUS:    CK - 40%-59% impaired, limited or restricted               - GOAL STATUS:           CJ - 20%-39% impaired, limited or restricted               - D/C STATUS:                       ---------------To be determined---------------  Payor: Sandy Wilks / Plan: [address] [place] [address] HMO / Product Type: [employer] Medicare /       Medical Necessity:     · Patient is expected to demonstrate progress in strength, range of motion, balance and coordination to decrease assistance required with overall functional mobility, transfers, ambulation. · Patient demonstrates good and fair rehab potential due to higher previous functional level.   Reason for Services/Other Comments:  · Patient continues to require present interventions due to patient's inability to perform bed mobility, transfers safely and effectively at prior level of function of CGA. Use of outcome tool(s) and clinical judgement create a POC that gives a: Clear prediction of patient's progress: LOW COMPLEXITY                 TREATMENT:      Pre-treatment Symptoms/Complaints:  Patient is drowsy but answers all questions appropriately and is able to carryon a conversation  Pain: Initial:   Pain Intensity 1: 0  Post Session:  0      Therapeutic Activity: (    40 Minutes): Therapeutic activities including Bed transfers and sit to stand transfers to improve mobility, strength and balance. Required minimal Safety awareness training to promote static and dynamic balance in sitting. Braces/Orthotics/Lines/Etc:   · O2 Device: Room air  Treatment/Session Assessment:    · Response to Treatment:  See above  · Interdisciplinary Collaboration:  · Physical Therapy Assistant and Registered Nurse  · After treatment position/precautions:  · Supine in bed  · Bed/Chair-wheels locked  · Bed in low position  · Call light within reach  · RN notified  · Family at bedside  · Side rails x 2  · Compliance with Program/Exercises: Will assess as treatment progresses. · Recommendations/Intent for next treatment session: \"Next visit will focus on advancements to more challenging activities\".   Total Treatment Duration:  PT Patient Time In/Time Out  Time In: 0945  Time Out: [address] [facility]

## 2017-02-21 NOTE — PROGRESS NOTES
[facility] CARDIOLOGY PROGRESS NOTE    2/21/2017 7:52 AM    Admit Date: 2/15/2017    Admit Diagnosis: chest pain;Leukocytosis      Subjective:   Stable overnight from cardiac standpoint without angina, CHF, or palpitations. Somnolence improving with cessation/holding narcotics/sedatives yesterday,  in RUQ with HIDA concerning for cholecystitis. Afebrile and WBC back to normal now on IV ABX. Objective:      Vitals:    02/20/17 1826 02/20/17 2300 02/21/17 0332 02/21/17 0705   BP: 134/68 149/84 145/58 137/79   Pulse: 88 79 82 86   Resp: 18 20 20 20   Temp: 98.8 °F (37.1 °C) 99.1 °F (37.3 °C) 98.1 °F (36.7 °C) 98.3 °F (36.8 °C)   SpO2: 93% 93% 94% 95%   Weight:       Height:           Physical Exam:  Neck- supple, no JVD  CV- regular rate and rhythm no MRG  Lung- clear bilaterally anteriorly  Abd- soft, RUQ tender, nondistended  Ext- no edema  Skin- warm and dry    Data Review:   Recent Labs      02/20/17   0941  02/19/17   0355   NA  142  140   K  3.7  3.6   BUN  30*  32*   CREA  1.52*  1.59*   GLU  132*  139*   WBC  9.2  9.2   HGB  10.6*  9.6*   HCT  31.1*  28.6*   PLT  137*  112*       Assessment and Plan:     Principal Problem:  Chest pain (2/16/2017)- resolved, negative cardiac evaluation with negative nuclear stress test, normal EF. Moderate risk for nuno if needed, control BP/HR/pain periop.  We will be on standby as needed.      Active Problems:  Orthostatic hypotension - stable, in bed, reassess in outpatient setting, stay hydrated.      CAD (coronary artery disease- negative ischemic workup, no further angina, presenting symptoms due to gallbladder most likely and not cardiac in nature.      Debility - rehab after discharge     Fever- recurrent to 101.2 overnight, due to gallbladder most likely- continue ABX, surgery evaluation     Leukocytosis - see above- improved and afebrile on ABX, continue per hospitalist MD's     Somnolence- more arousable today after holding sedatives/narcotics yesterday, follow clinically    RUQ pain- no gallbladder uptake on HIDA and RUQ stranding and gallbladder wall thickening on CT scan- ? All symptoms coming from gallbladder- defer further evaluation, ? Wilma per general surgery?     We will be on standby and follow from a distance. Please call if any more questions or further assistance from a CV standpoint needed. Thanks to hospitalists for assuming care of this patient.           MAGDA Mckeon MD  [address] Cardiology  Pager [phone]

## 2017-02-21 NOTE — PROGRESS NOTES
Hospitalist Progress Note    Subjective:   Daily Progress Note: 2/21/2017 3:25 PM    Mr. Balbir Parekh is an 80year old white male who [facility] from [facility] for chest pain. Was initially admitted to cardiology but cardiac etiology of chest pain ruled out and thus he was transferred to the Hospitalist service. Findings thus far are concerning for cholecystitis and he is on IV vancomycin and merrem. Patient had been on plavix and this is being held with tentative plans for lap nuno on Friday. Mental status has not been at baseline per BALWINDER at bedside. His WBC count and JIM are improving with IVFs and antibiotics though. Urine/blood cultures with no growth to date. Flu negative. Alk phos in 200 range. Is currently sleeping, arousable but confused. Son in law at bedside.      Current Facility-Administered Medications   Medication Dose Route Frequency    Vancomycin Trough Reminder  1 Each Other Rx Dosing/Monitoring    potassium chloride 20 mEq in 100 ml IVPB  20 mEq IntraVENous Q2H    ALPRAZolam (XANAX) tablet 1 mg  1 mg Oral QHS PRN    traMADol (ULTRAM) tablet 50 mg  50 mg Oral QHS PRN    heparin (porcine) injection 5,000 Units  5,000 Units SubCUTAneous Q8H    acetaminophen (TYLENOL) suppository 650 mg  650 mg Rectal Q4H PRN    vancomycin (VANCOCIN) 1250 mg in  ml infusion  1,250 mg IntraVENous Q18H    meropenem 500 mg in 0.9% sodium chloride 50 mL IVPB  500 mg IntraVENous Q8H    docusate sodium (COLACE) capsule 100 mg  100 mg Oral BID    0.9% sodium chloride infusion  100 mL/hr IntraVENous CONTINUOUS    sodium chloride (NS) flush 5-10 mL  5-10 mL IntraVENous PRN    aspirin delayed-release tablet 325 mg  325 mg Oral DAILY    atorvastatin (LIPITOR) tablet 40 mg  40 mg Oral QHS    carbidopa-levodopa (SINEMET)  mg per tablet 1 Tab  1 Tab Oral TID    magnesium oxide (MAG-OX) tablet 400 mg  400 mg Oral DAILY    memantine ER (NAMENDA XR) capsule 28 mg  28 mg Oral DAILY    midodrine (PROAMITINE) tablet 5 mg  5 mg Oral TID    pantoprazole (PROTONIX) tablet 40 mg  40 mg Oral ACB    promethazine (PHENERGAN) tablet 25 mg  25 mg Oral Q6H PRN    tamsulosin (FLOMAX) capsule 0.4 mg  0.4 mg Oral DAILY    insulin lispro (HUMALOG) injection   SubCUTAneous AC&HS    nitroglycerin (NITROSTAT) tablet 0.4 mg  0.4 mg SubLINGual Q5MIN PRN        Review of Systems  Not obtainable due to patient's current mental status. Objective:     Visit Vitals    /66    Pulse 71    Temp 97.4 °F (36.3 °C)    Resp 19    Ht 5' 9\" (1.753 m)    Wt 78.8 kg (173 lb 11.2 oz)    SpO2 96%    BMI 25.65 kg/m2      O2 Device: Room air    Temp (24hrs), Av.5 °F (36.9 °C), Min:97.4 °F (36.3 °C), Max:99.2 °F (37.3 °C)      701 - 1900  In: 438 [I.V.:438]  Out: -   1901 -  0700  In: 3385 [P.O.:1450; I.V.:3183]  Out: 0     General: asleep but arousable, confused, no acute distress  Eyes; non icteric, EOMI  Neck; supple  CV: RRR  Abd; soft, non distended, active bowel sounds    Additional comments: all labs, notes and studies from the past 24 hours have been personally reviewed today by me.      Data Review    Recent Results (from the past 24 hour(s))   GLUCOSE, POC    Collection Time: 17  4:48 PM   Result Value Ref Range    Glucose (POC) 125 (H) 65 - 100 mg/dL   GLUCOSE, POC    Collection Time: 17  8:55 PM   Result Value Ref Range    Glucose (POC) 171 (H) 65 - 762 mg/dL   METABOLIC PANEL, COMPREHENSIVE    Collection Time: 17  6:02 AM   Result Value Ref Range    Sodium 144 136 - 145 mmol/L    Potassium 3.3 (L) 3.5 - 5.1 mmol/L    Chloride 108 (H) 98 - 107 mmol/L    CO2 23 21 - 32 mmol/L    Anion gap 13 7 - 16 mmol/L    Glucose 120 (H) 65 - 100 mg/dL    BUN 24 (H) 8 - 23 MG/DL    Creatinine 1.22 0.8 - 1.5 MG/DL    GFR est AA >60 >60 ml/min/1.73m2    GFR est non-AA >60 >60 ml/min/1.73m2    Calcium 8.1 (L) 8.3 - 10.4 MG/DL    Bilirubin, total 1.3 (H) 0.2 - 1.1 MG/DL    ALT (SGPT) 23 12 - 65 U/L AST (SGOT) 53 (H) 15 - 37 U/L    Alk. phosphatase 254 (H) 50 - 136 U/L    Protein, total 6.1 (L) 6.3 - 8.2 g/dL    Albumin 2.1 (L) 3.2 - 4.6 g/dL    Globulin 4.0 (H) 2.3 - 3.5 g/dL    A-G Ratio 0.5 (L) 1.2 - 3.5     CBC WITH AUTOMATED DIFF    Collection Time: 02/21/17  6:02 AM   Result Value Ref Range    WBC 8.7 4.3 - 11.1 K/uL    RBC 3.07 (L) 4.23 - 5.67 M/uL    HGB 9.9 (L) 13.6 - 17.2 g/dL    HCT 29.8 (L) 41.1 - 50.3 %    MCV 97.1 79.6 - 97.8 FL    MCH 32.2 26.1 - 32.9 PG    MCHC 33.2 31.4 - 35.0 g/dL    RDW 13.1 11.9 - 14.6 %    PLATELET 307 678 - 216 K/uL    MPV 10.9 10.8 - 14.1 FL    NEUTROPHILS 60 43 - 78 %    LYMPHOCYTES 21 13 - 44 %    MONOCYTES 17 (H) 4.0 - 12.0 %    EOSINOPHILS 2 0.5 - 7.8 %    BASOPHILS 0 0.0 - 2.0 %    ABS. NEUTROPHILS 5.2 1.7 - 8.2 K/UL    ABS. LYMPHOCYTES 1.8 0.5 - 4.6 K/UL    ABS. MONOCYTES 1.5 (H) 0.1 - 1.3 K/UL    ABS. EOSINOPHILS 0.2 0.0 - 0.8 K/UL    ABS. BASOPHILS 0.0 0.0 - 0.2 K/UL    ABS. IMM. GRANS. 0.1 0.0 - 0.5 K/UL    RBC COMMENTS NORMOCYTIC/NORMOCHROMIC      WBC COMMENTS Result Confirmed By Smear      PLATELET COMMENTS ADEQUATE      DF AUTOMATED     GLUCOSE, POC    Collection Time: 02/21/17  6:41 AM   Result Value Ref Range    Glucose (POC) 125 (H) 65 - 100 mg/dL   GLUCOSE, POC    Collection Time: 02/21/17 11:02 AM   Result Value Ref Range    Glucose (POC) 185 (H) 65 - 100 mg/dL         Assessment/Plan:     Principal Problem:    Chest pain (2/16/2017)    Active Problems:    Orthostatic hypotension (7/21/2014)      CAD (coronary artery disease) (7/21/2014)      Di[facility] type II, [facility] (7/21/2014)      CKD (chronic kidney disease) stage 3, GFR 30-59 ml/min (7/21/2014)      Overview: Baseline Cr~1.9mg/dl      Debility (4/11/2015)      Acute encephalopathy (4/11/2015)      Fever (2/16/2017)      Leukocytosis (2/16/2017)      RUQ abdominal pain (2/20/2017)      Thrombocytopenia (HCC) (2/21/2017)    -continue merrem and vancomycin for now. -holding plavix with tentative surgery on Friday. Discussed with case management, patient's bed hold runs out on Friday so will need a new bed.   -check TSH, B12, folate. Encouraged BALWINDER to have lights/tv on during day, lights/tv off during night.   -replace potassium, check magnesium level.      Care Plan discussed with: the patient, his care team, his BALWINDER, case management    Signed By: Danny Ramirez MD     February 21, 2017

## 2017-02-21 NOTE — PROGRESS NOTES
Bertha Guajardo M.D. Colon and Rectal Surgeon  [facility] [address] [facility], [facility] [address]  Phone: [phone] Fax: [phone]      James Rodriguez  [id number]    SUBJECTIVE:  James Rodriguez is a 80y.o. year old male who I was asked to see regarding possible cholecystitis. On my initial evaluation, he was lethargic because of a recent pain medication administration, and history came from the chart and his daughter in the room. The patient developed chest pain on Wednesday of the week prior to admission. He came to the hospital and was admitted. He has been worked up from a cardiac standpoint and this was negative. Pain progressively moved from his chest, then into the center of his back, and then into his right shoulder. Daughter stated he has had abdominal pain for the last ~24 hours. No n/v. He has had fevers up to 101.7 and leukocytosis of 17 (2/16) in the hospital. Hospitalist saw the patient and started Tamiflu due to fevers, rigors, and diffuse body aches. Due to persistent symptoms, CT and Ultrasound were done. US showed no gallstones but some gallbladder wall thickening. This morning, he is minimally more responsive. Denies abdominal pain, but that is all I can get out of him. No hiccups this AM. HIDA done yesterday showing non-filling of gallbladder     ROS: unobtainable due to mental status    PHYSICAL EXAM:   Patient Vitals for the past 24 hrs:   BP Temp Pulse Resp SpO2   02/21/17 0705 137/79 98.3 °F (36.8 °C) 86 20 95 %   02/21/17 0332 145/58 98.1 °F (36.7 °C) 82 20 94 %   02/20/17 2300 149/84 99.1 °F (37.3 °C) 79 20 93 %   02/20/17 1826 134/68 98.8 °F (37.1 °C) 88 18 93 %   02/20/17 1649 165/76 99.2 °F (37.3 °C) 83 18 93 %   02/20/17 1100 125/65 (!) 100.5 °F (38.1 °C) 87 20 90 %       General--lethargic, tries to open eyes to command. Barely answers my one question regarding abdominal pain  Abdomen--soft, nondistended.   Today, there is suggestion of slight discomfort on RUQ palpation. No peritoneal signs, no rebound, no guarding. UOP: 3/4  BM: nr    Recent Labs      02/21/17   0602  02/20/17   0941  02/19/17   0355   WBC  8.7  9.2  9.2   HGB  9.9*  10.6*  9.6*   HCT  29.8*  31.1*  28.6*   PLT  165  137*  112*       Recent Labs      02/21/17   0602  02/20/17   0941  02/19/17   0355   NA  144  142  140   K  3.3*  3.7  3.6   CL  108*  108*  106   CO2  23  21  24   BUN  24*  30*  32*   CREA  1.22  1.52*  1.59*   TBILI  1.3*  1.3*  0.9   SGOT  53*  56*  77*   ALT  23  26  13   AP  254*  272*  204*       UA with 30 protein, trace ketones, small bili, no LE or nitrites, bact negative  Trending troponins negative  Lipids normal  Urine cx 2/16/17--neg x2 days  Blood cx 2/16/17--neg x5 days  Blood cx 2/18/17--neg x 3 days  Blood cx 2/19/17--neg x 2 days  Flu negative  Procalcitonin 2/20/17--negative  Above labs reviewed by me. Echo 2/16/17--\"SUMMARY:  Left ventricle: Systolic function was at the lower limits of normal. Ejection fraction was estimated in the range of 50 % to 55 %. There were no regional wall motion abnormalities. There was mild concentric hypertrophy.  Right ventricle: There was mild pulmonary artery hypertension. Right atrium: The atrium was mildly dilated. There was a possible, large, spherical, solid, fixed mass in the atrial cavity. The possibility that this Is a tumor cannot be excluded.  Mitral valve: There was mild regurgitation. \"     IMAGING: Images reviewed by me. 2/15/17--CT angio: \"IMPRESSION:  No acute arterial findings. However, there is extensive coronary artery disease present.  Extensive bilateral pulmonary parenchymal disease, worse within the right lung. \"  2/18/17--CT abd/pelvis: \"IMPRESSION: There is evidence of mild gallbladder wall thickening with new fluid/stranding in the adjacent right upper quadrant, this is nonspecific but given history of abdominal pain and fever raises concern for cholecystitis.  Consider further evaluation with right upper quadrant ultrasound if clinically warranted.   New small right pleural effusion with persistent parenchymal disease in the visualized lung bases. \"  2/19/17--RUQ US: \"The pancreas is obscured and not well evaluated due to bowel gas. The liver displays normal echogenicity. No evidence of focal disease. No intrahepatic biliary ductal dilatation. Small amount of perihepatic fluid. The juxtahepatic IVC is unremarkable. The visualized abdominal aorta is of normal caliber.   The gallbladder demonstrates edematous wall thickening measuring up to 6 mm in thickness. No shadowing stones identified. Sonographic Higgins's sign is reported as negative. The common duct measures 4 mm.   The right kidney measures 9.7 cm longitudinally. No hydronephrosis. IMPRESSION: Edematous appearing gallbladder wall thickening is nonspecific without evidence of cholelithiasis and reportedly negative sonographic Higgins's sign. Cholecystitis cannot be completely excluded, other possible etiology include but are not limited to volume overload, hepatic failure, or renal failure. \"  2/20/17--HIDA: \"IMPRESSION: No evidence of bile duct obstruction. The gallbladder is never visualized. The findings are concerning for acute cholecystitis, especially given the appearance of the gallbladder on recent CT and ultrasound. \"     ASSESSMENT:   Possible cholecystitis, based on nonfilling on HIDA  Abdominal pain, by history, minimal on exam today  Anemia, dilutional and phlebotomy  Thrombocytopenia--resolved  Leukocytosis--resolved  Mildly elevated LFTs  Hypokalemia  JIM--improved    PLAN:  --His abdominal exam remains unimpressive. --On imaging, he has gallbladder wall thickening and nonfilling on HIDA. This could be consistent with cholecystitis.   That being said, I do not know if we can blame his current mental status changes on his gallbladder, as he has not cleared with antibiotic therapy, resolution of leukocytosis and abdominal pain  --I will need to discuss the current situation with his family. I am willing to offer an operation in hopes of improving his current situation but they need to understand that the operation has significant risks and may not improve his mental status changes. --He was on plavix up until yesterday. Could do his operation Friday if family interested.     --will follow  Mag Olivarez MD

## 2017-02-22 NOTE — PROGRESS NOTES
Called and spoke with daughter regarding HIDA, her father's abdominal pain, etc.  I offered cholecystectomy Friday, due to plavix use. She is interested in proceeding. I had an informed consent discussion with her for laparoscopic or open cholecystectomy with possible intraoperative cholangiogram.  I delineated the risks of bleeding, infection, abscess, damage to CBD, retained CBD stone, CBD stricture, damage to bowel, bladder, blood vessels, nerves, stomach, liver, bile leak, hernia, need for reoperation, need for further procedures, failure of the procedure to resolve his nausea/abdominal pain, DVT, PE, UTI and the usual cardiac and respiratory risks inherent in surgery. Questions were answered.     Florin Jenkins MD

## 2017-02-22 NOTE — PROGRESS NOTES
Hospitalist Progress Note    Subjective:   Daily Progress Note: 2/22/2017 8:43 AM    Mr. Johnathan James is an 80year old white male who presented 2/16 from SNF for chest pain. Was initially admitted to cardiology but cardiac etiology of chest pain ruled out and thus he was transferred to the Hospitalist service. Findings thus far are concerning for cholecystitis and he is on IV vancomycin and merrem. Patient had been on plavix and this is being held with tentative plans for lap nuno on Friday pending family/surgery discussions. Mental status has not been at baseline but now patient much more awake, alert and oriented this morning. Daughter at bedside states he is much more his usual self today. His WBC count and JIM are improving with IVFs and antibiotics though. Urine/blood cultures with no growth to date. Flu negative. Discomfort much more localized now to RUQ. Patient wishes to pursue lap nuno per my discussion with him this morning. No current nausea, sob, chest pain.      Current Facility-Administered Medications   Medication Dose Route Frequency    potassium chloride 20 mEq in 100 ml IVPB  20 mEq IntraVENous Q2H    Vancomycin Trough Reminder  1 Each Other Rx Dosing/Monitoring    ALPRAZolam (XANAX) tablet 1 mg  1 mg Oral QHS PRN    traMADol (ULTRAM) tablet 50 mg  50 mg Oral QHS PRN    heparin (porcine) injection 5,000 Units  5,000 Units SubCUTAneous Q8H    acetaminophen (TYLENOL) suppository 650 mg  650 mg Rectal Q4H PRN    vancomycin (VANCOCIN) 1250 mg in  ml infusion  1,250 mg IntraVENous Q18H    meropenem 500 mg in 0.9% sodium chloride 50 mL IVPB  500 mg IntraVENous Q8H    docusate sodium (COLACE) capsule 100 mg  100 mg Oral BID    0.9% sodium chloride infusion  100 mL/hr IntraVENous CONTINUOUS    sodium chloride (NS) flush 5-10 mL  5-10 mL IntraVENous PRN    aspirin delayed-release tablet 325 mg  325 mg Oral DAILY    atorvastatin (LIPITOR) tablet 40 mg  40 mg Oral QHS    carbidopa-levodopa (SINEMET)  mg per tablet 1 Tab  1 Tab Oral TID    magnesium oxide (MAG-OX) tablet 400 mg  400 mg Oral DAILY    memantine ER (NAMENDA XR) capsule 28 mg  28 mg Oral DAILY    midodrine (PROAMITINE) tablet 5 mg  5 mg Oral TID    pantoprazole (PROTONIX) tablet 40 mg  40 mg Oral ACB    promethazine (PHENERGAN) tablet 25 mg  25 mg Oral Q6H PRN    tamsulosin (FLOMAX) capsule 0.4 mg  0.4 mg Oral DAILY    insulin lispro (HUMALOG) injection   SubCUTAneous AC&HS    nitroglycerin (NITROSTAT) tablet 0.4 mg  0.4 mg SubLINGual Q5MIN PRN        Review of Systems  A comprehensive 12 point ROS was obtained and findings negative unless stated otherwise in above HPI    Objective:     Visit Vitals    /70    Pulse 67    Temp 97.5 °F (36.4 °C)    Resp 18    Ht 5' 9\" (1.753 m)    Wt 78.8 kg (173 lb 11.2 oz)    SpO2 96%    BMI 25.65 kg/m2      O2 Device: Room air    Temp (24hrs), Av.2 °F (36.8 °C), Min:97.3 °F (36.3 °C), Max:99.8 °F (37.7 °C)          1901 -  0700  In: 5836 [P.O.:1270; I.V.:1944]  Out: 0     General: awake, alert, oriented to person and place, no acute distress, in good spirits  Eyes; non icteric, EOMI  Neck; supple  CV: RRR  PULM: CTAB  Abd; soft, non distended, tender in RUQ, active BS    Additional comments: All labs, notes and studies from the past 24 hours have been personally reviewed today by me.      Data Review    Recent Results (from the past 24 hour(s))   GLUCOSE, POC    Collection Time: 17 11:02 AM   Result Value Ref Range    Glucose (POC) 185 (H) 65 - 100 mg/dL   GLUCOSE, POC    Collection Time: 17  4:46 PM   Result Value Ref Range    Glucose (POC) 132 (H) 65 - 100 mg/dL   GLUCOSE, POC    Collection Time: 17  8:26 PM   Result Value Ref Range    Glucose (POC) 199 (H) 65 - 100 mg/dL   VANCOMYCIN, TROUGH    Collection Time: 17 11:15 PM   Result Value Ref Range    Vancomycin,trough 17.9 5 - 20 ug/mL   CBC WITH AUTOMATED DIFF Collection Time: 02/22/17  5:11 AM   Result Value Ref Range    WBC 8.1 4.3 - 11.1 K/uL    RBC 2.93 (L) 4.23 - 5.67 M/uL    HGB 9.5 (L) 13.6 - 17.2 g/dL    HCT 28.3 (L) 41.1 - 50.3 %    MCV 96.6 79.6 - 97.8 FL    MCH 32.4 26.1 - 32.9 PG    MCHC 33.6 31.4 - 35.0 g/dL    RDW 12.9 11.9 - 14.6 %    PLATELET 660 961 - 138 K/uL    MPV 10.9 10.8 - 14.1 FL    NEUTROPHILS 58 43 - 78 %    LYMPHOCYTES 27 13 - 44 %    MONOCYTES 12 4.0 - 12.0 %    EOSINOPHILS 3 0.5 - 7.8 %    BASOPHILS 0 0.0 - 2.0 %    ABS. NEUTROPHILS 4.7 1.7 - 8.2 K/UL    ABS. LYMPHOCYTES 2.2 0.5 - 4.6 K/UL    ABS. MONOCYTES 1.0 0.1 - 1.3 K/UL    ABS. EOSINOPHILS 0.2 0.0 - 0.8 K/UL    ABS. BASOPHILS 0.0 0.0 - 0.2 K/UL    ABS. IMM.  GRANS. 0.1 0.0 - 0.5 K/UL    RBC COMMENTS NORMOCYTIC/NORMOCHROMIC      WBC COMMENTS Result Confirmed By Smear      PLATELET COMMENTS ADEQUATE      DF AUTOMATED     METABOLIC PANEL, BASIC    Collection Time: 02/22/17  5:11 AM   Result Value Ref Range    Sodium 145 136 - 145 mmol/L    Potassium 3.5 3.5 - 5.1 mmol/L    Chloride 108 (H) 98 - 107 mmol/L    CO2 26 21 - 32 mmol/L    Anion gap 11 7 - 16 mmol/L    Glucose 116 (H) 65 - 100 mg/dL    BUN 21 8 - 23 MG/DL    Creatinine 1.08 0.8 - 1.5 MG/DL    GFR est AA >60 >60 ml/min/1.73m2    GFR est non-AA >60 >60 ml/min/1.73m2    Calcium 8.2 (L) 8.3 - 10.4 MG/DL   MAGNESIUM    Collection Time: 02/22/17  5:11 AM   Result Value Ref Range    Magnesium 1.9 1.8 - 2.4 mg/dL   PHOSPHORUS    Collection Time: 02/22/17  5:11 AM   Result Value Ref Range    Phosphorus 2.6 2.3 - 3.7 MG/DL   GLUCOSE, POC    Collection Time: 02/22/17  8:20 AM   Result Value Ref Range    Glucose (POC) 130 (H) 65 - 100 mg/dL         Assessment/Plan:     Principal Problem:    Chest pain (2/16/2017)    Active Problems:    Orthostatic hypotension (7/21/2014)      CAD (coronary artery disease) (7/21/2014)      Diabetes mellitus, type II, insulin dependent (HCC) (7/21/2014)      CKD (chronic kidney disease) stage 3, GFR 30-59 ml/min (7/21/2014)      Overview: Baseline Cr~1.9mg/dl      Debility (4/11/2015)      Acute encephalopathy (4/11/2015)      Fever (2/16/2017)      Leukocytosis (2/16/2017)      RUQ abdominal pain (2/20/2017)      Thrombocytopenia ([facility] (2/21/2017)    -continue vancomycin and merrem for now  -possibly to OR on Friday for lap nuno after wash out? If for some reason they do not end up going with sxr, need to know soon so that he can be discharged by Friday so that he will not lose his Medicaid bed.   -mental status greatly improving!  -replace potassium.      Care Plan discussed with: the patient, his daughter, his care team.       Signed By: Jennifer Garcia MD     February 22, 2017

## 2017-02-22 NOTE — PROGRESS NOTES
Dean Ruiz M.D. Colon and Rectal Surgeon  [facility] [address] [facility], [facility] [address]   Phone: [phone] Fax: [phone]      Cristhian Burden  [id number]    SUBJECTIVE:  Cristhian Burden is a 80y.o. year old male who I was asked to see regarding possible cholecystitis. On my initial evaluation, he was lethargic because of a recent pain medication administration, and history came from the chart and his daughter in the room. The patient developed chest pain on Wednesday of the week prior to admission. He came to the hospital and was admitted. He has been worked up from a cardiac standpoint and this was negative. Pain progressively moved from his chest, then into the center of his back, and then into his right shoulder. Daughter stated he has had abdominal pain for the last ~24 hours. No n/v. He has had fevers up to 101.7 and leukocytosis of 17 (2/16) in the hospital. Hospitalist saw the patient and started Tamiflu due to fevers, rigors, and diffuse body aches. Due to persistent symptoms, CT and Ultrasound were done. US showed no gallstones but some gallbladder wall thickening. This morning, he is much more responsive than he has been the last three days. Clearly reports RUQ pain today. No n/v. No hiccups this AM.  HIDA done showing non-filling of gallbladder     ROS: No CP/SOB. No palpitations. +RUQ pain. No n/v    PHYSICAL EXAM:   Patient Vitals for the past 24 hrs:   BP Temp Pulse Resp SpO2   02/22/17 0441 109/64 98.7 °F (37.1 °C) 74 18 93 %   02/21/17 2340 157/70 99.8 °F (37.7 °C) 70 18 97 %   02/21/17 1900 133/61 97.3 °F (36.3 °C) 69 18 97 %   02/21/17 1505 137/60 98.4 °F (36.9 °C) 79 19 96 %   02/21/17 1100 139/66 97.4 °F (36.3 °C) 71 19 96 %       General--much more awake, answers questions. Appears comfortable at rest  Abdomen--soft, nondistended. Today, there is clearly some pain on palpation of RUQ with some voluntary guarding.    No peritoneal signs, no rebound, no guarding. UOP: 3/1  BM: nr    Recent Labs      02/22/17   0511  02/21/17   0602  02/20/17   0941   WBC  8.1  8.7  9.2   HGB  9.5*  9.9*  10.6*   HCT  28.3*  29.8*  31.1*   PLT  193  165  137*       Recent Labs      02/21/17   0602  02/20/17   0941   NA  144  142   K  3.3*  3.7   CL  108*  108*   CO2  23  21   BUN  24*  30*   CREA  1.22  1.52*   TBILI  1.3*  1.3*   SGOT  53*  56*   ALT  23  26   AP  254*  272*       UA with 30 protein, trace ketones, small bili, no LE or nitrites, bact negative  Trending troponins negative  Lipids normal  Urine cx 2/16/17--neg x2 days  Blood cx 2/16/17--neg x5 days  Blood cx 2/18/17--neg x 3 days  Blood cx 2/19/17--neg x 2 days  Flu negative  Procalcitonin 2/20/17--negative  Above labs reviewed by me. Echo 2/16/17--\"SUMMARY:  Left ventricle: Systolic function was at the lower limits of normal. Ejection fraction was estimated in the range of 50 % to 55 %. There were no regional wall motion abnormalities. There was mild concentric hypertrophy.  Right ventricle: There was mild pulmonary artery hypertension. Right atrium: The atrium was mildly dilated. There was a possible, large, spherical, solid, fixed mass in the atrial cavity. The possibility that this Is a tumor cannot be excluded.  Mitral valve: There was mild regurgitation. \"     IMAGING: Images reviewed by me. 2/15/17--CT angio: \"IMPRESSION:  No acute arterial findings. However, there is extensive coronary artery disease present.  Extensive bilateral pulmonary parenchymal disease, worse within the right lung. \"  2/18/17--CT abd/pelvis: \"IMPRESSION: There is evidence of mild gallbladder wall thickening with new fluid/stranding in the adjacent right upper quadrant, this is nonspecific but given history of abdominal pain and fever raises concern for cholecystitis.  Consider further evaluation with right upper quadrant ultrasound if clinically warranted.   New small right pleural effusion with persistent parenchymal disease in the visualized lung bases. \"  2/19/17--RUQ US: \"The pancreas is obscured and not well evaluated due to bowel gas. The liver displays normal echogenicity. No evidence of focal disease. No intrahepatic biliary ductal dilatation. Small amount of perihepatic fluid. The juxtahepatic IVC is unremarkable. The visualized abdominal aorta is of normal caliber.   The gallbladder demonstrates edematous wall thickening measuring up to 6 mm in thickness. No shadowing stones identified. Sonographic Higgins's sign is reported as negative. The common duct measures 4 mm.   The right kidney measures 9.7 cm longitudinally. No hydronephrosis. IMPRESSION: Edematous appearing gallbladder wall thickening is nonspecific without evidence of cholelithiasis and reportedly negative sonographic Higgins's sign. Cholecystitis cannot be completely excluded, other possible etiology include but are not limited to volume overload, hepatic failure, or renal failure. \"  2/20/17--HIDA: \"IMPRESSION: No evidence of bile duct obstruction. The gallbladder is never visualized. The findings are concerning for acute cholecystitis, especially given the appearance of the gallbladder on recent CT and ultrasound. \"     ASSESSMENT:   Possible cholecystitis, based on nonfilling on HIDA  Abdominal pain, by history, minimal on exam today  Anemia, dilutional and phlebotomy  Thrombocytopenia--resolved  Leukocytosis--resolved  Mildly elevated LFTs  Hypokalemia  JIM--improved    PLAN:  --His abdominal exam more clearly points to RUQ pathology this AM.  He is much more awake!  --On imaging, he has gallbladder wall thickening and nonfilling on HIDA. This could be consistent with cholecystitis. --I will need to discuss the current situation with his family.   I am willing to offer an operation in hopes of improving his current situation but they need to understand that the operation has significant risks and may not improve his mental status changes. --He was on plavix up until Sunday. Could do his operation Friday if family interested.     --will follow  Mady Briceno MD

## 2017-02-22 NOTE — PROGRESS NOTES
Pt has become more and more alert as the day progressed. Pt is alert and able to carry on a conversation. Family has been at the [facility] all day.   No distress noted at this time

## 2017-02-22 NOTE — PROGRESS NOTES
Dispo update:  Spoke to Ms. Sol Ramírez at [facility]. They will hold the [place] Medicaid bed past the mandatory 10 days as a professional courtesy. Updated Dr. Mayra Aguilar, Juan A Heather Angela in room 201, and daughter Kate Chris (her cell is [phone]; other daughter is Delia Matos, cell [phone]).

## 2017-02-22 NOTE — PROGRESS NOTES
Problem: Self Care Deficits Care Plan (Adult)  Goal: *Acute Goals and Plan of Care (Insert Text)  1. Patient will perform grooming with minimal assistance within 7 days with equipment as needed. 2. Patient will perform upper body dressing with minimal assistance within 7 days with equipment as needed. 3. Patient will perform bathing with moderate assistance within 7 days with equipment as needed. 4. Patient will perform functional transfers with minimal assistance within 7 days with equipment as needed. 5. Pt will participate in B UE therapeutic exercises for 8 minutes with 3 rest breaks within 7 days. 6. Pt and or caregiver to demonstrate and verbalize good understanding of recommendations for increasing safety with functional tasks within 7 days. OCCUPATIONAL THERAPY: Daily Note, Treatment Day: 1st and PM    2/22/2017  INPATIENT: Hospital Day: 8  Payor: Sandy Wilks / Plan: [address] [place] [address] HMO / Product Type: Managed Care Medicare /      NAME/AGE/GENDER: Cam Peck is a 80 y.o. male       PRIMARY DIAGNOSIS:  Cholecystitis [K81.9] Chest pain Chest pain  Procedure(s) (LRB):  CHOLECYSTECTOMY LAPAROSCOPIC WITH POSS INTRAOP CHOLANGIOGRAM  ROOM 201 (N/A)     ICD-10: Treatment Diagnosis:        · Stiffness of Left Shoulder, Not elsewhere classified (M25.612)  · Stiffness of Right Shoulder, Not elsewhere classified (M25.611)  · Generalized Muscle Weakness (M62.81)  · Other lack of cordination (R27.8)   Precautions/Allergies:        Falls, Naprosyn [naproxen] and Pravastatin       ASSESSMENT:      Mr. Tatum Castle admitted from [facility] with the above diagnosis. Patient's daughter present and she stated that her father needed some assistance with dressing and bathing prior to admit, however now he needs more assistance with tasks. Patient's daughter stated that her dad was nonambulatory but he could transfer to a wheelchair with assistance.   Patient's daughter said that patient was even able to propel his wheelchair and feed himself prior to admit. 2/22/2017 Pt was positioned in supine upon arrival. Pt was agreeable to treatment and was able to transfer to sitting with modified independence. Pt with good static standing balance. Pt was able to complete sit to stand and functional mobility with a rolling walker and minimal assistance x's 2 for safety. Pt sat up in the chair and participated in exercises with moderate visual, verbal, and physical cues. Pt demonstrated some mild cognitive deficits with processing cues for exercises. Pt was left up in the chair with posey in place and belongings in reach. RN aware. Pt participated very well and will continue to benefit from OT to increase progression toward above goals. This section established at most recent assessment   PROBLEM LIST (Impairments causing functional limitations):  1. Decreased Strength  2. Decreased ADL/Functional Activities  3. Decreased Transfer Abilities  4. Decreased Ambulation Ability/Technique  5. Decreased Balance  6. Decreased Activity Tolerance  7. Increased Fatigue  8. Increased Shortness of Breath  9. Decreased Flexibility/Joint Mobility  10. Decreased Knowledge of Precautions  11. Decreased Cognition    INTERVENTIONS PLANNED: (Benefits and precautions of occupational therapy have been discussed with the patient.)  1. Activities of daily living training  2. Adaptive equipment training  3. Balance training  4. Clothing management  5. Cognitive training  6. Donning&doffing training  7. Group therapy  8. Hygiene training  9. Neuromuscular re-eduation  10. Re-evaluation  11. Sensory reintergration training  12. Theraputic activity  13. Theraputic exercise      TREATMENT PLAN: Frequency/Duration: Follow patient 3x's/ week to address above goals. Rehabilitation Potential For Stated Goals: GOOD      RECOMMENDED REHABILITATION/EQUIPMENT: (at time of discharge pending progress): Continue Skilled Therapy. OCCUPATIONAL PROFILE AND HISTORY:   History of Present Injury/Illness (Reason for Referral):  Silverio Cee is a 80 y.o. male who presents to the ER from [facility] with complaints of chest pain and back pain. He states chest pain started last night. Pain is sharp in nature and radiates to the right side of his chest. He denies any dyspnea, nausea or diaphoresis. He also complains of severe back pain that started this morning. He states the chest pain was no better with NTG. He states Morphine is the only thing that helped. He has known CAD. He states he has not had chest pain in quite some time. He has orthostatic hypotension and is on chronic midodrine  Past Medical History/Comorbidities:    [facility]  has a past medical history of CAD (coronary artery disease); Diabetes ([address] Hypertension; Neurological disorder; and Psychiatric disorder.  [facility]  has a past surgical history that includes cardiac surg procedure unlist.  Social History/Living Environment:   Home Environment: Long term care  # Steps to Enter: 0  One/Two Story Residence: One story  Living Alone: No  Support Systems: Family member(s)  Patient Expects to be Discharged to[de-identified] Skilled nursing facility  Current DME Used/Available at Home: Wheelchair (mainly uses w/c; able to SPT with assist)  Tub or Shower Type: Shower  Prior Level of Function/Work/Activity:  Patient able to feed self; patient required assistance with ADLs and transfers. Number of Personal Factors/Comorbidities that affect the Plan of Care  · CAD  · Neurological Disorder: Expanded review of therapy/medical records (1-2):  MODERATE COMPLEXITY   ASSESSMENT OF OCCUPATIONAL PERFORMANCE[de-identified]   Activities of Daily Living:           Basic ADLs (From Assessment) Complex ADLs (From Assessment)   Basic ADL  Feeding: Moderate assistance  Oral Facial Hygiene/Grooming: Maximum assistance  Bathing: Maximum assistance  Upper Body Dressing: Maximum assistance  Lower Body Dressing:  Total assistance  Toileting: Total assistance Instrumental ADL  Meal Preparation: Total assistance  Homemaking: Total assistance  Medication Management: Total assistance  Financial Management: Total assistance   Grooming/Bathing/Dressing Activities of Daily Living                             Bed/Mat Mobility  Rolling: Modified independent  Supine to Sit: Minimum assistance  Sit to Supine: Minimum assistance  Sit to Stand: Minimum assistance  Bed to Chair: Minimum assistance;Assist x2  Scooting: Supervision          Most Recent Physical Functioning:   Gross Assessment:                  Posture:  Posture (WDL): Exceptions to WDL  Posture Assessment: Forward head, Rounded shoulders  Balance:  Sitting: Impaired  Sitting - Static: Good (unsupported)  Sitting - Dynamic: Fair (occasional)  Standing: Impaired  Standing - Static: Fair  Standing - Dynamic : Fair Bed Mobility:  Rolling: Modified independent  Supine to Sit: Minimum assistance  Sit to Supine: Minimum assistance  Scooting: Supervision  Wheelchair Mobility:     Transfers:  Sit to Stand: Minimum assistance  Stand to Sit: Contact guard assistance  Bed to Chair: Minimum assistance;Assist x2                    Patient Vitals for the past 6 hrs:   BP SpO2 Pulse   02/22/17 1128 137/66 97 % 77   02/22/17 1526 144/72 97 % 70        Mental Status  Neurologic State: Alert, Confused (Confused at times)  Orientation Level: Oriented to place, Oriented to person  Cognition: Follows commands, Impaired decision making  Perception: Cues to maintain midline in sitting  Perseveration: No perseveration noted  Safety/Judgement: Fall prevention, Decreased awareness of environment                               Physical Skills Involved:  1. Range of Motion  2. Balance  3. Mobility  4. Strength  5. Endurance  6. Fine or Gross Motor Coordination  7. Dexterity Cognitive Skills Affected (resulting in the inability to perform in a timely and safe manner):  1. Attending  2. Thinking  3.  Problem Solving  4. Mental Sequencing  5. Learning  6. Remembering Psychosocial Skills Affected:  1. Habits  2. Routines and Behaviors   Number of elements that affect the Plan of Care: 5+:  HIGH COMPLEXITY   CLINICAL DECISION MAKIN[address] [place] [address] AM-PAC 6 Clicks   Basic Mobility Inpatient Short Form  How much help from another person does the patient currently need. .. Total A Lot A Little None   1. Putting on and taking off regular lower body clothing? [X] 1   [ ] 2   [ ] 3   [ ] 4   2. Bathing (including washing, rinsing, drying)? [ ] 1   [X] 2   [ ] 3   [ ] 4   3. Toileting, which includes using toilet, bedpan or urinal?   [X] 1   [ ] 2   [ ] 3   [ ] 4   4. Putting on and taking off regular upper body clothing?   [ ] 1   [X] 2   [ ] 3   [ ] 4   5. Taking care of personal grooming such as brushing teeth? [ ] 1   [X] 2   [ ] 3   [ ] 4   6. Eating meals? [ ] 1   [X] 2   [ ] 3   [ ] 4   © , Trustees of [address] [place] [address], under license to [employer]. All rights reserved    Score:  Initial: CL Most Recent: X (Date: -- )     Interpretation of Tool:  Represents activities that are increasingly more difficult (i.e. Bed mobility, Transfers, Gait). Score 24 23 22-20 19-15 14-10 9-7 6       Modifier CH CI CJ CK CL CM CN         · Self Care:               - CURRENT STATUS:    CL - 60%-79% impaired, limited or restricted               - GOAL STATUS:           CK - 40%-59% impaired, limited or restricted               - D/C STATUS:                       ---------------To be determined---------------  Payor: [employer] MEDICARE / Plan: [address] [place] [address] HMO / Product Type: Managed Care Medicare /       Medical Necessity:     · Patient demonstrates good rehab potential due to higher previous functional level. Reason for Services/Other Comments:  · Patient continues to require skilled intervention due to patient's inability to take care of self.    Use of outcome tool(s) and clinical judgement create a POC that gives a: MODERATE COMPLEXITY             TREATMENT:   (In addition to Assessment/Re-Assessment sessions the following treatments were rendered)      Pre-treatment Symptoms/Complaints:    Pain: Initial:   Pain Intensity 1: 0  Post Session:        Therapeutic Activity: (15 minutes): Therapeutic activities including Bed transfers, Chair transfers and static/dynamic standing to improve mobility, strength, balance and activity tolerance. Required minimal Safety awareness training;Verbal cues to promote static and dynamic balance in standing. Therapeutic Exercise: (15 minutes):  Exercises per grid below to improve mobility and strength. Required moderate visual, verbal and manual cues to promote proper body mechanics. Progressed resistance and range as indicated. All exercises were completed with yellow theraband Date:  2/22/17 Date:   Date:     Activity/Exercise Parameters Parameters Parameters   Shoulder flexion/extension 15 reps     Shoulder horizontal add/abb 15 reps     Punches 15 reps     Elbow flexion/extension 15 reps                  Braces/Orthotics/Lines/Etc:   · IV  · O2 Device: Room air  Treatment/Session Assessment:    · Response to Treatment:  Patient tolerated treatment well. · Interdisciplinary Collaboration:  · Physical Therapy Assistant, Certified Occupational Therapy Assistant, Registered Nurse and Rehabilitation Attendant  · After treatment position/precautions:  · Up in chair, Bed alarm/tab alert on, Bed/Chair-wheels locked, Call light within reach and RN notified  · Compliance with Program/Exercises: Will assess as treatment progresses. · Recommendations/Intent for next treatment session: \"Next visit will focus on advancements to more challenging activities and reduction in assistance provided\".   Total Treatment Duration:  OT Patient Time In/Time Out  Time In: 1355  Time Out: 2313 Deniz Chu

## 2017-02-22 NOTE — PROGRESS NOTES
Problem: Mobility Impaired (Adult and Pediatric)  Goal: *Acute Goals and Plan of Care (Insert Text)  LTG:  (1.)Mr. Bernarda Pacheco will move from supine to sit and sit to supine , scoot up and down and roll side to side with MODIFIED INDEPENDENCE within 7 day(s). (2.)Mr. Bernarda Pacheco will transfer from bed to chair and chair to bed with CONTACT GUARD ASSIST using the least restrictive device within 7 day(s). (3.)Mr. Bernarda Pacheco will ambulate with CONTACT GUARD ASSIST for 5 feet with the least restrictive device within 7 day(s). (4.)Mr. Bernarda Pacheco will propel manual wheelchair with B hands and feet for 150 within 7 days for increased mobility and strength.   _____________________________________________________________________________________________      PHYSICAL THERAPY: Daily Note, Treatment Day: 2nd and AM 2/22/2017  INPATIENT: Hospital Day: 8  Payor: Will Shaw / Plan: [address] [place] [address] HMO / Product Type: [employer] Medicare /      NAME/AGE/GENDER: Arley Hoyos is a 80 y.o. male       PRIMARY DIAGNOSIS: Cholecystitis [K81.9] Chest pain Chest pain  Procedure(s) (LRB):  CHOLECYSTECTOMY LAPAROSCOPIC WITH POSS INTRAOP CHOLANGIOGRAM  ROOM 201 (N/A)     ICD-10: Treatment Diagnosis:       · Generalized Muscle Weakness (M62.81)  · Difficulty in walking, Not elsewhere classified (R26.2)   Precaution/Allergies:  Naprosyn [naproxen] and Pravastatin       ASSESSMENT:      Mr. Bernarda Pacheco presents lying supine in bed with daughter present. He was alert and talkative throughout treatment. He needed less assistance with bed mobility this treatment. His balance sitting at edge of bed is poor and he needs constant verbal cues to correct a side bending to the left. He stood with min assist x 2 and was able to take several steps to the head of the bed. He then sat at edge of bed and was able to stand 2 x more with moderate assist x 2. He fatigued quickly and returned to supine. Good progress with mobility this treatment.      This section established at most recent assessment   PROBLEM LIST (Impairments causing functional limitations):  1. Decreased Strength  2. Decreased ADL/Functional Activities  3. Decreased Transfer Abilities  4. Decreased Ambulation Ability/Technique  5. Decreased Balance  6. Decreased Activity Tolerance  7. Increased Fatigue  8. Decreased Cognition    INTERVENTIONS PLANNED: (Benefits and precautions of physical therapy have been discussed with the patient.)  1. Balance Exercise  2. Bed Mobility  3. Family Education  4. Gait Training  5. Home Exercise Program (HEP)  6. Therapeutic Activites  7. Therapeutic Exercise/Strengthening  8. Transfer Training  9. Group Therapy      TREATMENT PLAN: Frequency/Duration: 3 times a week for duration of hospital stay  Rehabilitation Potential For Stated Goals: GOOD      RECOMMENDED REHABILITATION/EQUIPMENT: (at time of discharge pending progress): Continue Skilled Therapy. HISTORY:   History of Present Injury/Illness (Reason for Referral):  Tati Benton is a 80 y.o. male who presents to the ER from [facility] with complaints of chest pain and back pain. He states chest pain started last night. Pain is sharp in nature and radiates to the right side of his chest. He denies any dyspnea, nausea or diaphoresis. He also complains of severe back pain that started this morning. He states the chest pain was no better with NTG. He states Morphine is the only thing that helped. He has known CAD. He states he has not had chest pain in quite some time. He has orthostatic hypotension and is on chronic midodrine. Past Medical History/Comorbidities:   Mr. Gian Augustin  has a past medical history of CAD (coronary artery disease); Diabetes ([address] Hypertension; Neurological disorder; and Psychiatric disorder.   Mr. Gian Augustin  has a past surgical history that includes cardiac surg procedure unlist.  Social History/Living Environment:   Home Environment: Long term care  # Steps to Enter: 0  One/Two Story Residence: One story  Living Alone: No  Support Systems: Family member(s)  Patient Expects to be Discharged to[de-identified] Skilled nursing facility  Current DME Used/Available at Home: Wheelchair (mainly uses w/c; able to SPT with assist)  Tub or Shower Type: Shower  Prior Level of Function/Work/Activity:  Lives at [facility], uses wheelchair for mobility, required assist for ADLs  Personal Factors:          Sex:  male        Age:  80 y.o. Number of Personal Factors/Comorbidities that affect the Plan of Care:  Age, dementia, CAD, DM 3+: HIGH COMPLEXITY   EXAMINATION:   Most Recent Physical Functioning:   Gross Assessment:                  Posture:  Posture (WDL): Exceptions to WDL  Posture Assessment: Forward head, Rounded shoulders  Balance:  Sitting: Impaired  Sitting - Static: Poor (constant support)  Sitting - Dynamic: Poor (constant support)  Standing: Impaired  Standing - Static: Fair  Standing - Dynamic : Fair Bed Mobility:  Rolling: Contact guard assistance  Supine to Sit: Minimum assistance  Sit to Supine: Minimum assistance  Scooting: Minimum assistance  Wheelchair Mobility:     Transfers:  Sit to Stand: Minimum assistance;Assist x2 (Moderate assist x 1)  Stand to Sit: Contact guard assistance  Gait:     Base of Support: Widened  Speed/Deandra: Shuffled  Gait Abnormalities: Decreased step clearance;Shuffling gait  Distance (ft): 3 Feet (ft)  Assistive Device:  (handheld assist x 2)  Ambulation - Level of Assistance: Minimal assistance  Interventions: Safety awareness training;Verbal cues       Body Structures Involved:  1. Joints  2. Muscles  3. Ligaments Body Functions Affected:  1. Movement Related Activities and Participation Affected:  1. General Tasks and Demands  2. Mobility  3.  Self Care   Number of elements that affect the Plan of Care: 4+: HIGH COMPLEXITY   CLINICAL PRESENTATION:   Presentation: Stable and uncomplicated: LOW COMPLEXITY   CLINICAL DECISION MAKIN [employer] Mobility Inpatient Short Form  How much difficulty does the patient currently have. .. Unable A Lot A Little None   1. Turning over in bed (including adjusting bedclothes, sheets and blankets)? [ ] 1   [ ] 2   [X] 3   [ ] 4   2. Sitting down on and standing up from a chair with arms ( e.g., wheelchair, bedside commode, etc.)   [ ] 1   [X] 2   [ ] 3   [ ] 4   3. Moving from lying on back to sitting on the side of the bed? [ ] 1   [X] 2   [ ] 3   [ ] 4   How much help from another person does the patient currently need. .. Total A Lot A Little None   4. Moving to and from a bed to a chair (including a wheelchair)? [ ] 1   [ ] 2   [X] 3   [ ] 4   5. Need to walk in hospital room? [ ] 1   [ ] 2   [X] 3   [ ] 4   6. Climbing 3-5 steps with a railing? [ ] 1   [X] 2   [ ] 3   [ ] 4   © 2007, Trustees of [address] [place] [address], under license to [employer]. All rights reserved    Score:  Initial: 15 Most Recent: X (Date: -- )     Interpretation of Tool:  Represents activities that are increasingly more difficult (i.e. Bed mobility, Transfers, Gait). Score 24 23 22-20 19-15 14-10 9-7 6       Modifier CH CI CJ CK CL CM CN         · Mobility - Walking and Moving Around:               - CURRENT STATUS:    CK - 40%-59% impaired, limited or restricted               - GOAL STATUS:           CJ - 20%-39% impaired, limited or restricted               - D/C STATUS:                       ---------------To be determined---------------  Payor: Gianni Downs / Plan: [address] [place] [address] HMO / Product Type: Managed Care Medicare /       Medical Necessity:     · Patient is expected to demonstrate progress in strength, range of motion, balance and coordination to decrease assistance required with overall functional mobility, transfers, ambulation. · Patient demonstrates good and fair rehab potential due to higher previous functional level.   Reason for Services/Other Comments:  · Patient continues to require present interventions due to patient's inability to perform bed mobility, transfers safely and effectively at prior level of function of CGA. Use of outcome tool(s) and clinical judgement create a POC that gives a: Clear prediction of patient's progress: LOW COMPLEXITY                 TREATMENT:      Pre-treatment Symptoms/Complaints:  Patient is talkative and answers all questions appropriately. Pain: Initial:   Pain Intensity 1: 0  Post Session:  0      Therapeutic Activity: (    30 Minutes): Therapeutic activities including Bed transfers and sit to stand transfers to improve mobility, strength and balance. Required minimal Safety awareness training;Verbal cues to promote static and dynamic balance in sitting. Braces/Orthotics/Lines/Etc:   · O2 Device: Room air  Treatment/Session Assessment:    · Response to Treatment:  See above  · Interdisciplinary Collaboration:  · Physical Therapy Assistant and Registered Nurse  · After treatment position/precautions:  · Supine in bed  · Bed/Chair-wheels locked  · Bed in low position  · Call light within reach  · RN notified  · Family at bedside  · Side rails x 2  · Compliance with Program/Exercises: Will assess as treatment progresses. · Recommendations/Intent for next treatment session: \"Next visit will focus on advancements to more challenging activities\".   Total Treatment Duration:  PT Patient Time In/Time Out  Time In: 0945  Time Out: [address], PTA

## 2017-02-22 NOTE — PROGRESS NOTES
Pharmacokinetic Consult to Pharmacist    Anton Devante is a 80 y.o. male being treated for FUO with vancomycin and merrem. Height: 5' 9\" (175.3 cm)  Weight: 78.8 kg (173 lb 11.2 oz)  Lab Results   Component Value Date/Time    BUN 21 02/22/2017 05:11 AM    Creatinine 1.08 02/22/2017 05:11 AM    WBC 8.1 02/22/2017 05:11 AM    Procalcitonin 1.0 02/20/2017 09:41 AM      Estimated Creatinine Clearance: 52.7 mL/min (based on Cr of 1.08). Cultures:  2/16: BCx - NGX5D, final   UCx - NGX2D, final  2/18: BCx - NGX3D, pending      Lab Results   Component Value Date/Time    Vancomycin,trough 17.9 02/21/2017 11:15 PM       Day 7 of vancomycin. Goal trough is 15-20. Trough = 17.9. Continue 1.25 gm q18h dosing. Will continue to follow patient.       Thank you,  Renuka Mccain, PharmD, [facility]  Clinical Pharmacist  [phone]

## 2017-02-23 PROBLEM — I51.89 ATRIAL MASS: Status: ACTIVE | Noted: 2017-01-01

## 2017-02-23 PROBLEM — Z95.0 PACEMAKER: Status: ACTIVE | Noted: 2017-01-01

## 2017-02-23 NOTE — PROGRESS NOTES
[facility] CARDIOLOGY PROGRESS NOTE           2/23/2017 11:08 AM    Admit Date: 2/15/2017      Subjective:   No CP though continued RUQ pain, no SOB. Weak. ROS:  Cardiovascular:  As noted above    Objective:      Vitals:    02/22/17 1912 02/22/17 2330 02/23/17 0329 02/23/17 0739   BP: 165/88 117/49 147/75 130/61   Pulse: 84 94 (!) 58 67   Resp: 18 18 18 16   Temp: 97.9 °F (36.6 °C) 98.2 °F (36.8 °C) 97.8 °F (36.6 °C) 98.1 °F (36.7 °C)   SpO2: 98% 94% 93% 95%   Weight:       Height:           Physical Exam:  General-No Acute Distress  Neck- supple, no JVD  CV- regular rate and rhythm   Lung- clear bilaterally  Abd- soft, nontender, nondistended  Ext- no edema bilaterally. Skin- warm and dry    Data Review:   Recent Labs      02/23/17   0500  02/22/17   0511   NA  144  145   K  3.8  3.5   MG  2.0  1.9   BUN  18  21   CREA  1.05  1.08   GLU  122*  116*   WBC  8.7  8.1   HGB  9.4*  9.5*   HCT  27.6*  28.3*   PLT  205  193       Assessment/Plan:   Chest pain- Negative cardiac enzymes and CTA,  couldn't lie still for nuclear stress test.     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 Cholecysitits- pending nuno Friday 2-24. NS at 100ml/hr, IV vanc. S/P pacemaker- S/P Medtr[employer] PM.     Orthostatic hypotension- Proamitine. CAD- Acute inferior MI 2014 at [facility], subtotal occlusion of small posterolateral branch of dRCA too small for intervention, remote PCI to LAD-  cont ASA, statin. Diabetes mellitus, type II, insulin dependent- Cont current meds. CKD (chronic kidney disease) stage 3, GFR 30-59 ml/min- Cr 1.05. Debility- Per primary. Acute encephalopathy- Improved, on namenda.         ANITA Murrieta  2/23/2017 11:08 AM

## 2017-02-23 NOTE — PROGRESS NOTES
Janice Marroquin M.D. Colon and Rectal Surgeon  [facility], [address] [facility], [facility] [address]   Phone: [phone] Fax: [phone]      Eduardo Edmond  [id number]    SUBJECTIVE:  Eduardo Edmond is a 80y.o. year old male who I was asked to see regarding possible cholecystitis. On my initial evaluation, he was lethargic because of a recent pain medication administration, and history came from the chart and his daughter in the room. The patient developed chest pain on Wednesday of the week prior to admission. He came to the hospital and was admitted. He has been worked up from a cardiac standpoint and this was negative. Pain progressively moved from his chest, then into the center of his back, and then into his right shoulder. Daughter stated he has had abdominal pain for the last ~24 hours. No n/v. He has had fevers up to 101.7 and leukocytosis of 17 (2/16) in the hospital. Hospitalist saw the patient and started Tamiflu due to fevers, rigors, and diffuse body aches. Due to persistent symptoms, CT and Ultrasound were done. US showed no gallstones but some gallbladder wall thickening. This morning, he is much more responsive. Clearly reports RUQ pain today. No n/v. No hiccups this AM.  HIDA done showing non-filling of gallbladder     ROS: No CP/SOB. No palpitations. +RUQ pain. No n/v    PHYSICAL EXAM:   Patient Vitals for the past 24 hrs:   BP Temp Pulse Resp SpO2   02/23/17 0329 147/75 97.8 °F (36.6 °C) (!) 58 18 93 %   02/22/17 2330 117/49 98.2 °F (36.8 °C) 94 18 94 %   02/22/17 1912 165/88 97.9 °F (36.6 °C) 84 18 98 %   02/22/17 1526 144/72 98 °F (36.7 °C) 70 18 97 %   02/22/17 1128 137/66 97.9 °F (36.6 °C) 77 18 97 %   02/22/17 0732 142/70 97.5 °F (36.4 °C) 67 18 96 %       General--awake, answers questions. Appears comfortable at rest  Abdomen--soft, nondistended. There is clearly some pain on palpation of RUQ with some voluntary guarding.    No peritoneal signs, no rebound, no guarding. UOP: 4/3  BM: 1    Recent Labs      02/23/17   0500  02/22/17   0511  02/21/17   0602   WBC  8.7  8.1  8.7   HGB  9.4*  9.5*  9.9*   HCT  27.6*  28.3*  29.8*   PLT  205  193  165       Recent Labs      02/22/17   0511  02/21/17   0602  02/20/17   0941   NA  145  144  142   K  3.5  3.3*  3.7   CL  108*  108*  108*   CO2  26  23  21   BUN  21  24*  30*   CREA  1.08  1.22  1.52*   TBILI   --   1.3*  1.3*   SGOT   --   53*  56*   ALT   --   23  26   AP   --   254*  272*   MG  1.9   --    --    PHOS  2.6   --    --        UA with 30 protein, trace ketones, small bili, no LE or nitrites, bact negative  Trending troponins negative  Lipids normal  Urine cx 2/16/17--neg x2 days  Blood cx 2/16/17--neg x5 days  Blood cx 2/18/17--neg x 4 days  Blood cx 2/19/17--neg x 3 days  Flu negative  Procalcitonin 2/20/17--negative  Above labs reviewed by me. Echo 2/16/17--\"SUMMARY:  Left ventricle: Systolic function was at the lower limits of normal. Ejection fraction was estimated in the range of 50 % to 55 %. There were no regional wall motion abnormalities. There was mild concentric hypertrophy.  Right ventricle: There was mild pulmonary artery hypertension. Right atrium: The atrium was mildly dilated. There was a possible, large, spherical, solid, fixed mass in the atrial cavity. The possibility that this Is a tumor cannot be excluded.  Mitral valve: There was mild regurgitation. \"     IMAGING: Images reviewed by me. 2/15/17--CT angio: \"IMPRESSION:  No acute arterial findings. However, there is extensive coronary artery disease present.  Extensive bilateral pulmonary parenchymal disease, worse within the right lung. \"  2/18/17--CT abd/pelvis: \"IMPRESSION: There is evidence of mild gallbladder wall thickening with new fluid/stranding in the adjacent right upper quadrant, this is nonspecific but given history of abdominal pain and fever raises concern for cholecystitis.  Consider further evaluation with right upper quadrant ultrasound if clinically warranted.   New small right pleural effusion with persistent parenchymal disease in the visualized lung bases. \"  2/19/17--RUQ US: \"The pancreas is obscured and not well evaluated due to bowel gas. The liver displays normal echogenicity. No evidence of focal disease. No intrahepatic biliary ductal dilatation. Small amount of perihepatic fluid. The juxtahepatic IVC is unremarkable. The visualized abdominal aorta is of normal caliber.   The gallbladder demonstrates edematous wall thickening measuring up to 6 mm in thickness. No shadowing stones identified. Sonographic Higgins's sign is reported as negative. The common duct measures 4 mm.   The right kidney measures 9.7 cm longitudinally. No hydronephrosis. IMPRESSION: Edematous appearing gallbladder wall thickening is nonspecific without evidence of cholelithiasis and reportedly negative sonographic Higgins's sign. Cholecystitis cannot be completely excluded, other possible etiology include but are not limited to volume overload, hepatic failure, or renal failure. \"  2/20/17--HIDA: \"IMPRESSION: No evidence of bile duct obstruction. The gallbladder is never visualized. The findings are concerning for acute cholecystitis, especially given the appearance of the gallbladder on recent CT and ultrasound. \"     ASSESSMENT:   Possible cholecystitis, based on nonfilling on HIDA  Abdominal pain, by history, minimal on exam today  Anemia, dilutional and phlebotomy  Thrombocytopenia--resolved  Leukocytosis--resolved  Mildly elevated LFTs  Hypokalemia  JIM--improved    PLAN:  --His abdominal exam more clearly points to RUQ pathology this AM.    --On imaging, he has gallbladder wall thickening and nonfilling on HIDA. This could be consistent with cholecystitis. --I discussed his current situation with daughter over the phone. I am willing to offer an operation in hopes of improving his current situation.   --I had an informed consent discussion with the patient for laparoscopic or open cholecystectomy with possible intraoperative cholangiogram.  I delineated the risks of bleeding, infection, abscess, damage to CBD, retained CBD stone, CBD stricture, damage to bowel, bladder, blood vessels, nerves, stomach, liver, bile leak, hernia, need for reoperation, need for further procedures, failure of the procedure to resolve his nausea/abdominal pain, DVT, PE, UTI and the usual cardiac and respiratory risks inherent in surgery. Questions were answered. --He was on plavix up until Sunday. Plan for lap nuno Friday afternoon.   NPO after MN  --will follow  Jose Daugherty MD

## 2017-02-23 NOTE — PROGRESS NOTES
Patient denies pain. Neurologically intact-  strength equal, pupils equal. VSS stable and WNL for patient. Patient sent back to room with transport.

## 2017-02-23 NOTE — PROGRESS NOTES
Problem: Nutrition Deficit  Goal: *Optimize nutritional status  Nutrition LOS Day 7 Note:      Assessment  Diet order(s): 2/15 cardiac, 2/16 NPO, 2/16 cardiac, 2/18 CCHO, (midnight on 2/24 NPO for cholecystectomy)  Food,Nutrition, and Pertinent History: Patient reported to ED with chest pain which has progressed from chest to the center of his back and right shoulder. He has had fevers up to 101.7 and leukocytosis of 17 (2/16) in the hospital. Due to persistent symptoms, CT and Ultrasound were done with findings of cholecystitis. Plans for cholecystectomy tomorrow (2/24). Patient was sedated and sleeping when dietetic intern, but his daughter reported he has been eating ~50% of meals. She reported he has not eaten much today due to being sedated. She reports he has had difficulty chewing foods, and patient's daughter was receptive to mechanical soft diet. She also agreed to ordering patient nutritional supplements. Anthropometrics: Height: 5' 9\" (175.3 cm), Weight Source: Bed, Weight: 78.8 kg (173 lb 11.2 oz), Body mass index is 25.65 kg/(m^2). BMI class of normal weight. Macronutrient Needs:  · EER:  4809-5935 kcal /day (25-30 kcal/kg actual BW)  · EPR:   grams protein/day (1.2-1.5 grams/kg IBW)  Intake/Comparative Standards: Average intake for past 7 day(s)/1 recorded meal(s): 5%. This potentially meets ~5% of kcal and ~5% of protein needs. Nutrition Diagnosis: Inadequate oral intake related to difficulty chewing and AMS as evidenced by patient currently sedated and patient's daughter report of patient's intake ~50%. Intervention:   Meals and snacks: Change diet consistency to mechanical soft.   Nutrition supplementation: Ensure Enlive, TID; strawberry     Roderick Wilson, Dietetic Intern

## 2017-02-23 NOTE — ANESTHESIA PREPROCEDURE EVALUATION
Anesthetic History               Review of Systems / Medical History  Patient summary reviewed, nursing notes reviewed and pertinent labs reviewed    Pulmonary                   Neuro/Psych              Cardiovascular    Hypertension: well controlled          Cardiac stents    Exercise tolerance: >4 METS  Comments: NYDIA off plavix 5 days.  R/O mi this admission   GI/Hepatic/Renal                Endo/Other    Diabetes: well controlled, type 2         Other Findings              Physical Exam    Airway  Mallampati: II  TM Distance: 4 - 6 cm  Neck ROM: normal range of motion   Mouth opening: Normal     Cardiovascular  Regular rate and rhythm,  S1 and S2 normal,  no murmur, click, rub, or gallop             Dental  No notable dental hx  Dentition: Edentulous     Pulmonary  Breath sounds clear to auscultation               Abdominal         Other Findings            Anesthetic Plan    ASA: 3  Anesthesia type: general          Induction: Intravenous  Anesthetic plan and risks discussed with: Patient

## 2017-02-23 NOTE — PROGRESS NOTES
Problem: Mobility Impaired (Adult and Pediatric)  Goal: *Acute Goals and Plan of Care (Insert Text)  LTG:  (1.)Mr. Christine Kramer will move from supine to sit and sit to supine , scoot up and down and roll side to side with MODIFIED INDEPENDENCE within 7 day(s). (2.)Mr. Christine Kramer will transfer from bed to chair and chair to bed with CONTACT GUARD ASSIST using the least restrictive device within 7 day(s). (3.)Mr. Christine Kramer will ambulate with CONTACT GUARD ASSIST for 5 feet with the least restrictive device within 7 day(s). (4.)Mr. Christine Kramer will propel manual wheelchair with B hands and feet for 150 within 7 days for increased mobility and strength.   _____________________________________________________________________________________________      PHYSICAL THERAPY: Daily Note, Treatment Day: 3rd and AM 2/23/2017  INPATIENT: Hospital Day: 9  Payor: Rafita Virk / Plan: [address] [place] [address] HMO / Product Type: [employer] Medicare /      NAME/AGE/GENDER: Jessica Coreas is a 80 y.o. male       PRIMARY DIAGNOSIS: Cholecystitis [K81.9] Chest pain Chest pain  Procedure(s) (LRB):  CHOLECYSTECTOMY LAPAROSCOPIC WITH POSS INTRAOP CHOLANGIOGRAM  ROOM 201 (N/A)     ICD-10: Treatment Diagnosis:       · Generalized Muscle Weakness (M62.81)  · Difficulty in walking, Not elsewhere classified (R26.2)   Precaution/Allergies:  Naprosyn [naproxen] and Pravastatin       ASSESSMENT:      Mr. Christine Kramer was supine in bed upon arrival and agreeable to PT. Pt able to perform rolling with Misty but needed min assist for supine to sit. He has good static sitting balance and was given modA/RW for STS. Pt demonstrates fair to poor standing balance and required min-mod assist for transfer to bedside chair. Mr. Christine Kramer has short, shuffle steps and requred numerous cues for foot placement, turning, and RW management. Once in chair pt was able to participate in B LE there-ex with several rest breaks.   Pt shows improvement towards goals today as he was able to transfer to chair with assist of one. Plan to continue POC. This section established at most recent assessment   PROBLEM LIST (Impairments causing functional limitations):  1. Decreased Strength  2. Decreased ADL/Functional Activities  3. Decreased Transfer Abilities  4. Decreased Ambulation Ability/Technique  5. Decreased Balance  6. Decreased Activity Tolerance  7. Increased Fatigue  8. Decreased Cognition    INTERVENTIONS PLANNED: (Benefits and precautions of physical therapy have been discussed with the patient.)  1. Balance Exercise  2. Bed Mobility  3. Family Education  4. Gait Training  5. Home Exercise Program (HEP)  6. Therapeutic Activites  7. Therapeutic Exercise/Strengthening  8. Transfer Training  9. Group Therapy      TREATMENT PLAN: Frequency/Duration: 3 times a week for duration of hospital stay  Rehabilitation Potential For Stated Goals: GOOD      RECOMMENDED REHABILITATION/EQUIPMENT: (at time of discharge pending progress): Continue Skilled Therapy. HISTORY:   History of Present Injury/Illness (Reason for Referral):  Dedra Orozco is a 80 y.o. male who presents to the ER from [facility] with complaints of chest pain and back pain. He states chest pain started last night. Pain is sharp in nature and radiates to the right side of his chest. He denies any dyspnea, nausea or diaphoresis. He also complains of severe back pain that started this morning. He states the chest pain was no better with NTG. He states Morphine is the only thing that helped. He has known CAD. He states he has not had chest pain in quite some time. He has orthostatic hypotension and is on chronic midodrine. Past Medical History/Comorbidities:   Mr. Mcbride Friday  has a past medical history of CAD (coronary artery disease); Diabetes ([address] Hypertension; Neurological disorder; and Psychiatric disorder.   Mr. Mcbride Friday  has a past surgical history that includes cardiac surg procedure unlist.  Social History/Living Environment:   Home Environment: Long term care  # Steps to Enter: 0  One/Two Story Residence: One story  Living Alone: No  Support Systems: Family member(s)  Patient Expects to be Discharged to[de-identified] Skilled nursing facility  Current DME Used/Available at Home: Wheelchair (mainly uses w/c; able to SPT with assist)  Tub or Shower Type: Shower  Prior Level of Function/Work/Activity:  Lives at [facility], uses wheelchair for mobility, required assist for ADLs  Personal Factors:          Sex:  male        Age:  80 y.o. Number of Personal Factors/Comorbidities that affect the Plan of Care:  Age, dementia, CAD, DM 3+: HIGH COMPLEXITY   EXAMINATION:   Most Recent Physical Functioning:   Gross Assessment:                  Posture:     Balance:  Sitting: Impaired  Sitting - Static: Good (unsupported)  Sitting - Dynamic: Fair (occasional)  Standing: Impaired  Standing - Static: Fair  Standing - Dynamic : Poor Bed Mobility:  Rolling: Modified independent  Supine to Sit: Minimum assistance  Scooting: Minimum assistance  Interventions: Verbal cues; Visual cues;Manual cues  Wheelchair Mobility:     Transfers:  Sit to Stand: Moderate assistance  Stand to Sit: Contact guard assistance  Bed to Chair: Minimum assistance; Moderate assistance  Interventions: Manual cues; Safety awareness training;Verbal cues; Visual cues  Gait:     Speed/Deandra: Shuffled; Slow  Step Length: Right shortened;Left shortened  Gait Abnormalities: Decreased step clearance  Distance (ft): 3 Feet (ft)  Assistive Device: Walker, rolling  Ambulation - Level of Assistance: Minimal assistance; Moderate assistance  Interventions: Manual cues; Safety awareness training;Verbal cues; Visual/Demos       Body Structures Involved:  1. Joints  2. Muscles  3. Ligaments Body Functions Affected:  1. Movement Related Activities and Participation Affected:  1. General Tasks and Demands  2. Mobility  3.  Self Care   Number of elements that affect the Plan of Care: 4+: HIGH COMPLEXITY CLINICAL PRESENTATION:   Presentation: Stable and uncomplicated: LOW COMPLEXITY   CLINICAL DECISION MAKIN[address] [place] [id number] AM-PAC 6 Clicks   Basic Mobility Inpatient Short Form  How much difficulty does the patient currently have. .. Unable A Lot A Little None   1. Turning over in bed (including adjusting bedclothes, sheets and blankets)? [ ] 1   [ ] 2   [X] 3   [ ] 4   2. Sitting down on and standing up from a chair with arms ( e.g., wheelchair, bedside commode, etc.)   [ ] 1   [X] 2   [ ] 3   [ ] 4   3. Moving from lying on back to sitting on the side of the bed? [ ] 1   [X] 2   [ ] 3   [ ] 4   How much help from another person does the patient currently need. .. Total A Lot A Little None   4. Moving to and from a bed to a chair (including a wheelchair)? [ ] 1   [ ] 2   [X] 3   [ ] 4   5. Need to walk in hospital room? [ ] 1   [ ] 2   [X] 3   [ ] 4   6. Climbing 3-5 steps with a railing? [ ] 1   [X] 2   [ ] 3   [ ] 4   © , Trustees of [address] [place] [address] [address] [id number], under license to [employer]. All rights reserved    Score:  Initial: 15 Most Recent: X (Date: -- )     Interpretation of Tool:  Represents activities that are increasingly more difficult (i.e. Bed mobility, Transfers, Gait).        Score 24 23 22-20 19-15 14-10 9-7 6       Modifier CH CI CJ CK CL CM CN         · Mobility - Walking and Moving Around:               - CURRENT STATUS:    CK - 40%-59% impaired, limited or restricted               - GOAL STATUS:           CJ - 20%-39% impaired, limited or restricted               - D/C STATUS:                       ---------------To be determined---------------  Payor: Ida Tinoco / Plan: [address] [place] [address] HMO / Product Type: [employer] Care Medicare /       Medical Necessity:     · Patient is expected to demonstrate progress in strength, range of motion, balance and coordination to decrease assistance required with overall functional mobility, transfers, ambulation. · Patient demonstrates good and fair rehab potential due to higher previous functional level. Reason for Services/Other Comments:  · Patient continues to require present interventions due to patient's inability to perform bed mobility, transfers safely and effectively at prior level of function of CGA. Use of outcome tool(s) and clinical judgement create a POC that gives a: Clear prediction of patient's progress: LOW COMPLEXITY                 TREATMENT:      Pre-treatment Symptoms/Complaints:  Pt c/o back pain. Pain: Initial:   Pain Intensity 1: 7  Pain Location 1: Back  Post Session:  7/10      Therapeutic Activity: (    12 Minutes): Therapeutic activities including Bed transfers, chair transfers, ambulation on level ground, and sit to stand transfers to improve mobility, strength, balance and coordination. Required minimal-moderate Manual cues; Safety awareness training;Verbal cues; Visual/Demos to promote static and dynamic balance in standing and promote coordination of bilateral, upper extremity(s), lower extremity(s). Therapeutic Exercise: (11 Minutes):  Exercises per grid below to improve mobility and strength. Required minimal visual, verbal and tactile cues to promote proper body alignment and promote proper body mechanics. Progressed repetitions as indicated.      Date:  2/23/17 Date:   Date:     Activity/Exercise Parameters Parameters Parameters   Seated AP 2 x 20 B     Seated marching 2 x 20 B     Seated hip abd/add 1 x 20 B     Seated LAQ 2 x 20 B                                Braces/Orthotics/Lines/Etc:   · O2 Device: Room air  Treatment/Session Assessment:    · Response to Treatment:  See above  · Interdisciplinary Collaboration:  · Physical Therapist, Registered Nurse and Certified Nursing Assistant/Patient Care Technician  · After treatment position/precautions:  · Up in chair, Bed/Chair-wheels locked, Bed in low position, Call light within reach, Family at bedside and [facility] activated  · Compliance with Program/Exercises: Will assess as treatment progresses. · Recommendations/Intent for next treatment session: \"Next visit will focus on advancements to more challenging activities\".   Total Treatment Duration:  PT Patient Time In/Time Out  Time In: 1050  Time Out: [address] PT, DPT

## 2017-02-23 NOTE — ROUTINE PROCESS
TRANSFER - OUT REPORT:    FELIX  Dr. Ilia Arriola    Versed 2mg, fentanyl 50mcg  FELIX  No masses seen  Pt tolerated well    Verbal report given to Stacie Pool RN(name) on Cam Peck  being transferred to 2nd floor(unit) for routine progression of care       Report consisted of patients Situation, Background, Assessment and   Recommendations(SBAR). Information from the following report(s) Procedure Summary was reviewed with the receiving nurse. Lines:   Peripheral IV 02/22/17 Right Hand (Active)   Site Assessment Clean, dry, & intact 2/23/2017 11:16 AM   Phlebitis Assessment 0 2/23/2017 11:16 AM   Infiltration Assessment 0 2/23/2017 11:16 AM   Dressing Status Clean, dry, & intact 2/23/2017 11:16 AM   Dressing Type Transparent 2/23/2017 11:16 AM   Hub Color/Line Status Infusing 2/23/2017 11:16 AM        Opportunity for questions and clarification was provided.       Patient transported with:   [employer]

## 2017-02-23 NOTE — PROGRESS NOTES
Hospitalist Progress Note    Subjective:   Daily Progress Note: 2/23/2017 8:43 AM    Mr. Dillan Roman is an 80year old white male who presented [facility]/16 from SNF for chest pain. Was initially admitted to cardiology but cardiac etiology of chest pain ruled out and thus he was transferred to the Hospitalist service. Findings thus far are concerning for cholecystitis and he is on IV vancomycin and merrem. Patient had been on plavix and this is being held with tentative plans for lap nuno on Friday pending family/surgery discussions. Mental status is back to normal. Urine/blood cultures with no growth to date. Flu negative. Discomfort much more localized now to RUQ. Patient awaits lap nuno. 2/23: No current nausea, sob, chest pain.      Current Facility-Administered Medications   Medication Dose Route Frequency    acetaminophen (TYLENOL) tablet 650 mg  650 mg Oral Q4H PRN    meropenem 500 mg in 0.9% sodium chloride 50 mL IVPB  500 mg IntraVENous Q6H    ALPRAZolam (XANAX) tablet 1 mg  1 mg Oral QHS PRN    traMADol (ULTRAM) tablet 50 mg  50 mg Oral QHS PRN    heparin (porcine) injection 5,000 Units  5,000 Units SubCUTAneous Q8H    acetaminophen (TYLENOL) suppository 650 mg  650 mg Rectal Q4H PRN    vancomycin (VANCOCIN) 1250 mg in  ml infusion  1,250 mg IntraVENous Q18H    docusate sodium (COLACE) capsule 100 mg  100 mg Oral BID    0.9% sodium chloride infusion  100 mL/hr IntraVENous CONTINUOUS    sodium chloride (NS) flush 5-10 mL  5-10 mL IntraVENous PRN    aspirin delayed-release tablet 325 mg  325 mg Oral DAILY    atorvastatin (LIPITOR) tablet 40 mg  40 mg Oral QHS    carbidopa-levodopa (SINEMET)  mg per tablet 1 Tab  1 Tab Oral TID    magnesium oxide (MAG-OX) tablet 400 mg  400 mg Oral DAILY    memantine ER (NAMENDA XR) capsule 28 mg  28 mg Oral DAILY    midodrine (PROAMITINE) tablet 5 mg  5 mg Oral TID    pantoprazole (PROTONIX) tablet 40 mg  40 mg Oral ACB    promethazine (PHENERGAN) tablet 25 mg  25 mg Oral Q6H PRN    tamsulosin (FLOMAX) capsule 0.4 mg  0.4 mg Oral DAILY    insulin lispro (HUMALOG) injection   SubCUTAneous AC&HS    nitroglycerin (NITROSTAT) tablet 0.4 mg  0.4 mg SubLINGual Q5MIN PRN        Review of Systems  A comprehensive 12 point ROS was obtained and findings negative unless stated otherwise in above HPI    Objective:     Visit Vitals    /75    Pulse (!) 58    Temp 97.8 °F (36.6 °C)    Resp 18    Ht 5' 9\" (1.753 m)    Wt 78.8 kg (173 lb 11.2 oz)    SpO2 93%    BMI 25.65 kg/m2      O2 Device: Room air    Temp (24hrs), Av °F (36.7 °C), Min:97.8 °F (36.6 °C), Max:98.2 °F (36.8 °C)          1901 -  0700  In: 2647 [I.V.:2647]  Out: -     General: awake, alert, oriented to person and place, no acute distress, in good spirits  Eyes; non icteric, EOMI  Neck; supple  CV: RRR  PULM: CTAB  Abd; soft, non distended, tender in RUQ, active BS    Additional comments: All labs, notes and studies from the past 24 hours have been personally reviewed today by me.      Data Review    Recent Results (from the past 24 hour(s))   GLUCOSE, POC    Collection Time: 17  8:20 AM   Result Value Ref Range    Glucose (POC) 130 (H) 65 - 100 mg/dL   GLUCOSE, POC    Collection Time: 17 11:13 AM   Result Value Ref Range    Glucose (POC) 196 (H) 65 - 100 mg/dL   GLUCOSE, POC    Collection Time: 17  4:59 PM   Result Value Ref Range    Glucose (POC) 129 (H) 65 - 100 mg/dL   GLUCOSE, POC    Collection Time: 17  8:37 PM   Result Value Ref Range    Glucose (POC) 174 (H) 65 - 100 mg/dL   CBC WITH AUTOMATED DIFF    Collection Time: 17  5:00 AM   Result Value Ref Range    WBC 8.7 4.3 - 11.1 K/uL    RBC 2.87 (L) 4.23 - 5.67 M/uL    HGB 9.4 (L) 13.6 - 17.2 g/dL    HCT 27.6 (L) 41.1 - 50.3 %    MCV 96.2 79.6 - 97.8 FL    MCH 32.8 26.1 - 32.9 PG    MCHC 34.1 31.4 - 35.0 g/dL    RDW 13.1 11.9 - 14.6 %    PLATELET 324 279 - 916 K/uL    MPV 10.7 (L) 10.8 - 14.1 FL NEUTROPHILS 63 43 - 78 %    LYMPHOCYTES 23 13 - 44 %    MONOCYTES 11 4.0 - 12.0 %    EOSINOPHILS 3 0.5 - 7.8 %    BASOPHILS 0 0.0 - 2.0 %    ABS. NEUTROPHILS 5.5 1.7 - 8.2 K/UL    ABS. LYMPHOCYTES 2.0 0.5 - 4.6 K/UL    ABS. MONOCYTES 1.0 0.1 - 1.3 K/UL    ABS. EOSINOPHILS 0.3 0.0 - 0.8 K/UL    ABS. BASOPHILS 0.0 0.0 - 0.2 K/UL    ABS. IMM.  GRANS. 0.1 0.0 - 0.5 K/UL    RBC COMMENTS NORMOCYTIC/NORMOCHROMIC      WBC COMMENTS Result Confirmed By Smear      PLATELET COMMENTS ADEQUATE      DF AUTOMATED     METABOLIC PANEL, BASIC    Collection Time: 02/23/17  5:00 AM   Result Value Ref Range    Sodium 144 136 - 145 mmol/L    Potassium 3.8 3.5 - 5.1 mmol/L    Chloride 110 (H) 98 - 107 mmol/L    CO2 24 21 - 32 mmol/L    Anion gap 10 7 - 16 mmol/L    Glucose 122 (H) 65 - 100 mg/dL    BUN 18 8 - 23 MG/DL    Creatinine 1.05 0.8 - 1.5 MG/DL    GFR est AA >60 >60 ml/min/1.73m2    GFR est non-AA >60 >60 ml/min/1.73m2    Calcium 8.0 (L) 8.3 - 10.4 MG/DL   MAGNESIUM    Collection Time: 02/23/17  5:00 AM   Result Value Ref Range    Magnesium 2.0 1.8 - 2.4 mg/dL   PHOSPHORUS    Collection Time: 02/23/17  5:00 AM   Result Value Ref Range    Phosphorus 2.6 2.3 - 3.7 MG/DL   GLUCOSE, POC    Collection Time: 02/23/17  6:00 AM   Result Value Ref Range    Glucose (POC) 126 (H) 65 - 100 mg/dL         Assessment/Plan:     Principal Problem:    Chest pain (2/16/2017)    Active Problems:    Orthostatic hypotension (7/21/2014)      CAD (coronary artery disease) (7/21/2014)      Diabetes mellitus, type II, insulin dependent (HCC) (7/21/2014)      CKD (chronic kidney disease) stage 3, GFR 30-59 ml/min (7/21/2014)      Overview: Baseline Cr~1.9mg/dl      Debility (4/11/2015)      Acute encephalopathy (4/11/2015)      Fever (2/16/2017)      Leukocytosis (2/16/2017)      RUQ abdominal pain (2/20/2017)      Thrombocytopenia (HCC) (2/21/2017)    -continue vancomycin and merrem for now  -To OR in am for lap nuno after wash out?   -mental status greatly improved. -replace potassium.   -Cards did FELIX and ruled out Atrial mass, Ok to proceed with nuno in am.     Care Plan discussed with: the patient, his daughter, his care team.     Dispo:  To Facility on Sat likely      Signed By: Benoit Batista MD     February 23, 2017

## 2017-02-24 NOTE — PROGRESS NOTES
Willian Perkins M.D. Colon and Rectal Surgeon  [address], [address] [place] [address], [facility] Siria Blancas   Phone: [phone] Fax: [phone]      Tati Benton  [id number]    SUBJECTIVE:  Tati Benton is a 80y.o. year old male who I was asked to see regarding possible cholecystitis. On my initial evaluation, he was lethargic because of a recent pain medication administration, and history came from the chart and his daughter in the room. The patient developed chest pain on Wednesday of the week prior to admission. He came to the hospital and was admitted. He has been worked up from a cardiac standpoint and this was negative. Pain progressively moved from his chest, then into the center of his back, and then into his right shoulder. Daughter stated he has had abdominal pain for the last ~24 hours. No n/v. He has had fevers up to 101.7 and leukocytosis of 17 (2/16) in the hospital. Hospitalist saw the patient and started Tamiflu due to fevers, rigors, and diffuse body aches. Due to persistent symptoms, CT and Ultrasound were done. US showed no gallstones but some gallbladder wall thickening. This morning, he reports RUQ pain persists. No n/v. No hiccups this AM.  HIDA done showing non-filling of gallbladder     ROS: No CP/SOB. No palpitations. +RUQ pain.   No n/v    PHYSICAL EXAM:   Patient Vitals for the past 24 hrs:   BP Temp Pulse Resp SpO2   02/24/17 0310 99/60 98.2 °F (36.8 °C) 77 18 94 %   02/23/17 2001 - - - - 97 %   02/23/17 1910 137/74 97.9 °F (36.6 °C) 80 21 96 %   02/23/17 1604 158/81 98.6 °F (37 °C) 82 16 96 %   02/23/17 1315 152/76 - 70 16 100 %   02/23/17 1300 150/79 - 66 14 100 %   02/23/17 1246 152/71 - 65 14 98 %   02/23/17 1240 129/60 98.1 °F (36.7 °C) 73 18 96 %   02/23/17 1235 145/69 - 71 16 98 %   02/23/17 1230 140/75 - 75 11 98 %   02/23/17 1225 136/79 - 75 12 98 %   02/23/17 1220 126/73 - 73 14 98 %   02/23/17 1215 136/65 - 77 20 99 %   02/23/17 1210 136/68 - 65 10 98 %   02/23/17 0739 130/61 98.1 °F (36.7 °C) 67 16 95 %       General--awake, answers questions. Appears comfortable at rest  Abdomen--soft, nondistended. There is clearly some pain on palpation of RUQ with some voluntary guarding. No peritoneal signs, no rebound, no guarding. UOP: 4/250+1  BM: nr    Recent Labs      02/23/17   0500  02/22/17   0511   WBC  8.7  8.1   HGB  9.4*  9.5*   HCT  27.6*  28.3*   PLT  205  193       Recent Labs      02/23/17   0500  02/22/17   0511   NA  144  145   K  3.8  3.5   CL  110*  108*   CO2  24  26   BUN  18  21   CREA  1.05  1.08   MG  2.0  1.9   PHOS  2.6  2.6       UA with 30 protein, trace ketones, small bili, no LE or nitrites, bact negative  Trending troponins negative  Lipids normal  Urine cx 2/16/17--neg x2 days  Blood cx 2/16/17--neg x5 days  Blood cx 2/18/17--neg x 4 days  Blood cx 2/19/17--neg x 3 days  Flu negative  Procalcitonin 2/20/17--negative  Above labs reviewed by me. Echo 2/16/17--\"SUMMARY:  Left ventricle: Systolic function was at the lower limits of normal. Ejection fraction was estimated in the range of 50 % to 55 %. There were no regional wall motion abnormalities. There was mild concentric hypertrophy.  Right ventricle: There was mild pulmonary artery hypertension. Right atrium: The atrium was mildly dilated. There was a possible, large, spherical, solid, fixed mass in the atrial cavity. The possibility that this Is a tumor cannot be excluded.  Mitral valve: There was mild regurgitation. \"     IMAGING: Images reviewed by me. 2/15/17--CT angio: \"IMPRESSION:  No acute arterial findings. However, there is extensive coronary artery disease present.  Extensive bilateral pulmonary parenchymal disease, worse within the right lung. \"  2/18/17--CT abd/pelvis: \"IMPRESSION: There is evidence of mild gallbladder wall thickening with new fluid/stranding in the adjacent right upper quadrant, this is nonspecific but given history of abdominal pain and fever raises concern for cholecystitis. Consider further evaluation with right upper quadrant ultrasound if clinically warranted.   New small right pleural effusion with persistent parenchymal disease in the visualized lung bases. \"  2/19/17--RUQ US: \"The pancreas is obscured and not well evaluated due to bowel gas. The liver displays normal echogenicity. No evidence of focal disease. No intrahepatic biliary ductal dilatation. Small amount of perihepatic fluid. The juxtahepatic IVC is unremarkable. The visualized abdominal aorta is of normal caliber.   The gallbladder demonstrates edematous wall thickening measuring up to 6 mm in thickness. No shadowing stones identified. Sonographic Higgins's sign is reported as negative. The common duct measures 4 mm.   The right kidney measures 9.7 cm longitudinally. No hydronephrosis. IMPRESSION: Edematous appearing gallbladder wall thickening is nonspecific without evidence of cholelithiasis and reportedly negative sonographic Higgins's sign. Cholecystitis cannot be completely excluded, other possible etiology include but are not limited to volume overload, hepatic failure, or renal failure. \"  2/20/17--HIDA: \"IMPRESSION: No evidence of bile duct obstruction. The gallbladder is never visualized. The findings are concerning for acute cholecystitis, especially given the appearance of the gallbladder on recent CT and ultrasound. \"     ASSESSMENT:   Possible cholecystitis, based on nonfilling on HIDA  Abdominal pain, by history, minimal on exam today  Anemia, dilutional and phlebotomy  Thrombocytopenia--resolved  Leukocytosis--resolved  Mildly elevated LFTs  Hypokalemia  JIM--improved    PLAN:  --His abdominal exam more clearly points to RUQ pathology this AM.    --On imaging, he has gallbladder wall thickening and nonfilling on HIDA. This could be consistent with cholecystitis. --I discussed his current situation with daughter over the phone.   I am willing to offer an operation in hopes of improving his current situation. --I had an informed consent discussion with the patient for laparoscopic or open cholecystectomy with possible intraoperative cholangiogram.  I delineated the risks of bleeding, infection, abscess, damage to CBD, retained CBD stone, CBD stricture, damage to bowel, bladder, blood vessels, nerves, stomach, liver, bile leak, hernia, need for reoperation, need for further procedures, failure of the procedure to resolve his nausea/abdominal pain, DVT, PE, UTI and the usual cardiac and respiratory risks inherent in surgery. Questions were answered. --He was on plavix up until Sunday. Plan for lap nuno this afternoon. NPO for now.   Continue antibiotics  --will follow       Bharat Garcia MD

## 2017-02-24 NOTE — PROGRESS NOTES
TRANSFER - OUT REPORT:    Verbal report given to Flora Linares RN) on Meenakshi Lady  being transferred to (preop) for ordered procedure       Report consisted of patients Situation, Background, Assessment and   Recommendations(SBAR). Information from the following report(s) SBAR, Kardex, Intake/Output, MAR and Recent Results was reviewed with the receiving nurse. Lines:   Peripheral IV 02/24/17 Left Arm (Active)   Site Assessment Clean, dry, & intact 2/24/2017 10:39 AM   Phlebitis Assessment 0 2/24/2017 10:39 AM   Infiltration Assessment 0 2/24/2017 10:39 AM   Dressing Status Clean, dry, & intact 2/24/2017 10:39 AM   Dressing Type Transparent 2/24/2017 10:39 AM   Hub Color/Line Status Infusing;Flushed 2/24/2017 10:39 AM        Opportunity for questions and clarification was provided.       Patient transported with:   O2 @ 2 liters

## 2017-02-24 NOTE — PROGRESS NOTES
Hospitalist Progress Note    Subjective:   Daily Progress Note: 2/24/2017 8:43 AM    Mr. Luz Arellano is an 80year old white male who [facility] from [facility] for chest pain. Was initially admitted to cardiology but cardiac etiology of chest pain ruled out and thus he was transferred to the Hospitalist service. Findings thus far are concerning for cholecystitis and he is on IV vancomycin and merrem. Mental status is back to normal. Urine/blood cultures with no growth to date. Flu negative. Discomfort much more localized now to RUQ. Patient awaits lap nuno after Plavix washout. 2/24: No nausea, sob, chest pain. Awaiting surgery.       Current Facility-Administered Medications   Medication Dose Route Frequency    famotidine (PEPCID) tablet 20 mg  20 mg Oral ONCE    midazolam (VERSED) injection 0.5-2 mg  0.5-2 mg IntraVENous ONCE PRN    fentaNYL citrate (PF) injection 25-75 mcg  25-75 mcg IntraVENous Multiple    GI Cocktail  30 mL Oral Q6H    calcium carbonate (TUMS) chewable tablet 400 mg [elemental]  400 mg Oral Q4H PRN    acetaminophen (TYLENOL) tablet 650 mg  650 mg Oral Q4H PRN    meropenem 500 mg in 0.9% sodium chloride 50 mL IVPB  500 mg IntraVENous Q6H    ALPRAZolam (XANAX) tablet 1 mg  1 mg Oral QHS PRN    traMADol (ULTRAM) tablet 50 mg  50 mg Oral QHS PRN    heparin (porcine) injection 5,000 Units  5,000 Units SubCUTAneous Q8H    acetaminophen (TYLENOL) suppository 650 mg  650 mg Rectal Q4H PRN    vancomycin (VANCOCIN) 1250 mg in  ml infusion  1,250 mg IntraVENous Q18H    docusate sodium (COLACE) capsule 100 mg  100 mg Oral BID    0.9% sodium chloride infusion  100 mL/hr IntraVENous CONTINUOUS    sodium chloride (NS) flush 5-10 mL  5-10 mL IntraVENous PRN    aspirin delayed-release tablet 325 mg  325 mg Oral DAILY    atorvastatin (LIPITOR) tablet 40 mg  40 mg Oral QHS    carbidopa-levodopa (SINEMET)  mg per tablet 1 Tab  1 Tab Oral TID    magnesium oxide (MAG-OX) tablet 400 mg 400 mg Oral DAILY    memantine ER (NAMENDA XR) capsule 28 mg  28 mg Oral DAILY    midodrine (PROAMITINE) tablet 5 mg  5 mg Oral TID    pantoprazole (PROTONIX) tablet 40 mg  40 mg Oral ACB    promethazine (PHENERGAN) tablet 25 mg  25 mg Oral Q6H PRN    tamsulosin (FLOMAX) capsule 0.4 mg  0.4 mg Oral DAILY    insulin lispro (HUMALOG) injection   SubCUTAneous AC&HS    nitroglycerin (NITROSTAT) tablet 0.4 mg  0.4 mg SubLINGual Q5MIN PRN        Review of Systems  A comprehensive 12 point ROS was obtained and findings negative unless stated otherwise in above HPI    Objective:     Visit Vitals    /59    Pulse 93    Temp 98.1 °F (36.7 °C)    Resp 16    Ht 5' 9\" (1.753 m)    Wt 78.8 kg (173 lb 11.2 oz)    SpO2 95%    BMI 25.65 kg/m2    O2 Flow Rate (L/min): 2 l/min O2 Device: Nasal cannula    Temp (24hrs), Av.2 °F (36.8 °C), Min:97.9 °F (36.6 °C), Max:98.6 °F (37 °C)      701 -  1900  In: 045 [I.V.:839]  Out: -   1901 -  0700  In: 4196 [I.V.:4196]  Out: 251 [Urine:251]    General: awake, alert, oriented to person and place, no acute distress, in good spirits  Eyes; non icteric, EOMI  Neck; supple  CV: RRR  PULM: CTAB  Abd; soft, non distended, tender in RUQ, active BS    Additional comments: All labs, notes and studies from the past 24 hours have been personally reviewed today by me.      Data Review    Recent Results (from the past 24 hour(s))   GLUCOSE, POC    Collection Time: 17  1:49 PM   Result Value Ref Range    Glucose (POC) 156 (H) 65 - 100 mg/dL   GLUCOSE, POC    Collection Time: 17  4:40 PM   Result Value Ref Range    Glucose (POC) 144 (H) 65 - 100 mg/dL   GLUCOSE, POC    Collection Time: 17  8:35 PM   Result Value Ref Range    Glucose (POC) 220 (H) 65 - 100 mg/dL   GLUCOSE, POC    Collection Time: 17  5:45 AM   Result Value Ref Range    Glucose (POC) 122 (H) 65 - 100 mg/dL         Assessment/Plan:     Principal Problem:    Chest pain (2/16/2017)    Active Problems:    Orthostatic hypotension (7/21/2014)      CAD (coronary artery disease) (7/21/2014)      Diabetes mellitus, type II, insulin dependent ([facility] (7/21/2014)      CKD (chronic kidney disease) stage 3, GFR 30-59 ml/min (7/21/2014)      Overview: Baseline Cr~1.9mg/dl      Debility (4/11/2015)      Acute encephalopathy (4/11/2015)      Fever (2/16/2017)      Leukocytosis (2/16/2017)      RUQ abdominal pain (2/20/2017)      Thrombocytopenia (HCC) (2/21/2017)      Atrial mass (2/23/2017)      Pacemaker (2/23/2017)    -continue vancomycin and merrem for now  -To OR today for lap nuno   -mental status greatly improved. -Cards did FELIX and ruled out Atrial mass, Ok to proceed with nuno. Care Plan discussed with: the patient, his daughter, his care team.     Dispo:  To Facility on Sat likely      Signed By: Gilberto Coleman MD     February 24, 2017

## 2017-02-24 NOTE — ANESTHESIA POSTPROCEDURE EVALUATION
Post-Anesthesia Evaluation and Assessment    Patient: Yessenia Hooker MRN: [id number]  SSN: [id number]    YOB: 1934  Age: 80 y.o. Sex: male       Cardiovascular Function/Vital Signs  Visit Vitals    /77    Pulse 77    Temp 37.1 °C (98.7 °F)    Resp 18    Ht 5' 9\" (1.753 m)    Wt 78.8 kg (173 lb 11.2 oz)    SpO2 96%    BMI 25.65 kg/m2       Patient is status post general anesthesia for Procedure(s):  CHOLECYSTECTOMY LAPAROSCOPIC . Nausea/Vomiting: None    Postoperative hydration reviewed and adequate. Pain:  Pain Scale 1: Numeric (0 - 10) (02/24/17 1705)  Pain Intensity 1: 0 (02/24/17 1705)   Managed    Neurological Status:   Neuro (WDL): Within Defined Limits (02/24/17 1705)  Neuro  Neurologic State: Alert (02/24/17 0935)  Orientation Level: Oriented to person;Oriented to place;Oriented to situation (02/24/17 0935)  Cognition: Appropriate decision making; Follows commands (02/24/17 0935)  Speech: Clear (02/24/17 0725)  Assessment L Pupil: Brisk;Round (02/18/17 2006)  Size L Pupil (mm): 3 (02/18/17 2006)  Assessment R Pupil: Brisk;Round (02/18/17 2006)  Size R Pupil (mm): 3 (02/18/17 2006)  LUE Motor Response: Purposeful (02/24/17 0725)  LLE Motor Response: Purposeful (02/24/17 0725)  RUE Motor Response: Purposeful (02/24/17 0725)  RLE Motor Response: Purposeful (02/24/17 0725)   At baseline    Mental Status and Level of Consciousness: Arousable    Pulmonary Status:   O2 Device: Nasal cannula (02/24/17 1705)   Adequate oxygenation and airway patent    Complications related to anesthesia: None    Post-anesthesia assessment completed.  No concerns    Signed By: Callie Bardales MD     February 24, 2017

## 2017-02-24 NOTE — PROGRESS NOTES
Problem: Self Care Deficits Care Plan (Adult)  Goal: *Acute Goals and Plan of Care (Insert Text)  1. Patient will perform grooming with minimal assistance within 7 days with equipment as needed. 2. Patient will perform upper body dressing with minimal assistance within 7 days with equipment as needed. 3. Patient will perform bathing with moderate assistance within 7 days with equipment as needed. 4. Patient will perform functional transfers with minimal assistance within 7 days with equipment as needed. 5. Pt will participate in B UE therapeutic exercises for 8 minutes with 3 rest breaks within 7 days. 6. Pt and or caregiver to demonstrate and verbalize good understanding of recommendations for increasing safety with functional tasks within 7 days. OCCUPATIONAL THERAPY: Daily Note, Treatment Day: 2nd and AM    2/24/2017  INPATIENT: Hospital Day: 10  Payor: Annette Simmonds / Plan: [address] [place] [address] HMO / Product Type: Managed Care Medicare /      NAME/AGE/GENDER: Aixa Valdez is a 80 y.o. male       PRIMARY DIAGNOSIS:  Cholecystitis [K81.9] Chest pain Chest pain  Procedure(s) (LRB):  CHOLECYSTECTOMY LAPAROSCOPIC  (N/A)  Day of Surgery  ICD-10: Treatment Diagnosis:        · Stiffness of Left Shoulder, Not elsewhere classified (M25.612)  · Stiffness of Right Shoulder, Not elsewhere classified (M25.611)  · Generalized Muscle Weakness (M62.81)  · Other lack of cordination (R27.8)   Precautions/Allergies:        Falls, Naprosyn [naproxen] and Pravastatin       ASSESSMENT:      Mr. Markos Wilder was admitted for the above diagnosis. Pt presents supine upon arrival. Pt required min assist for supine to sit transfer. Pt demonstrates fair sitting balance at edge of bed. Pt was able to stand with min assist and enter bathroom. Transferred to toilet for toileting and for bathing at this time. Pt required min/mod assist to complete self care tasks (in grid below).  Pt demonstrates decreased safety awareness and slightly impulsive with transfers. Instructed to perform purse lip breathing technique throughout session due to patient seeming to be SOB. Pt returned to recliner in room. RN aware. Will continue to benefit from skilled OT during stay. This section established at most recent assessment   PROBLEM LIST (Impairments causing functional limitations):  1. Decreased Strength  2. Decreased ADL/Functional Activities  3. Decreased Transfer Abilities  4. Decreased Ambulation Ability/Technique  5. Decreased Balance  6. Decreased Activity Tolerance  7. Increased Fatigue  8. Increased Shortness of Breath  9. Decreased Flexibility/Joint Mobility  10. Decreased Knowledge of Precautions  11. Decreased Cognition    INTERVENTIONS PLANNED: (Benefits and precautions of occupational therapy have been discussed with the patient.)  1. Activities of daily living training  2. Adaptive equipment training  3. Balance training  4. Clothing management  5. Cognitive training  6. Donning&doffing training  7. Group therapy  8. Hygiene training  9. Neuromuscular re-eduation  10. Re-evaluation  11. Sensory reintergration training  12. Theraputic activity  13. Theraputic exercise      TREATMENT PLAN: Frequency/Duration: Follow patient 3x's/ week to address above goals. Rehabilitation Potential For Stated Goals: GOOD      RECOMMENDED REHABILITATION/EQUIPMENT: (at time of discharge pending progress): Continue Skilled Therapy. OCCUPATIONAL PROFILE AND HISTORY:   History of Present Injury/Illness (Reason for Referral):  Emy Shah is a 80 y.o. male who presents to the ER from [facility] with complaints of chest pain and back pain. He states chest pain started last night. Pain is sharp in nature and radiates to the right side of his chest. He denies any dyspnea, nausea or diaphoresis. He also complains of severe back pain that started this morning. He states the chest pain was no better with NTG. He states Morphine is the only thing that helped. He has known CAD. He states he has not had chest pain in quite some time. He has orthostatic hypotension and is on chronic midodrine  Past Medical History/Comorbidities:   Mr. Bernarda Pacheco  has a past medical history of CAD (coronary artery disease); Diabetes ([address] Hypertension; Neurological disorder; and Psychiatric disorder. Mr. Bernarda Pacheco  has a past surgical history that includes cardiac surg procedure unlist.  Social History/Living Environment:   Home Environment: Long term care  # Steps to Enter: 0  One/Two Story Residence: One story  Living Alone: No  Support Systems: Family member(s)  Patient Expects to be Discharged to[de-identified] Skilled nursing facility  Current DME Used/Available at Home: Wheelchair (mainly uses w/c; able to SPT with assist)  Tub or Shower Type: Shower  Prior Level of Function/Work/Activity:  Patient able to feed self; patient required assistance with ADLs and transfers. Number of Personal Factors/Comorbidities that affect the Plan of Care  · CAD  · Neurological Disorder: Expanded review of therapy/medical records (1-2):  MODERATE COMPLEXITY   ASSESSMENT OF OCCUPATIONAL PERFORMANCE[de-identified]   Activities of Daily Living:           Basic ADLs (From Assessment) Complex ADLs (From Assessment)   Basic ADL  Feeding: Moderate assistance  Oral Facial Hygiene/Grooming: Maximum assistance  Bathing: Maximum assistance  Upper Body Dressing: Maximum assistance  Lower Body Dressing: Total assistance  Toileting: Total assistance Instrumental ADL  Meal Preparation: Total assistance  Homemaking: Total assistance  Medication Management: Total assistance  Financial Management: Total assistance   Grooming/Bathing/Dressing Activities of Daily Living   Grooming  Washing Face: Supervision/set-up Cognitive Retraining  Safety/Judgement: Decreased awareness of environment   Upper Body Bathing  Bathing Assistance: Minimum assistance  Position Performed:  Other (comment) (seated on commode)  Cues: Verbal cues provided     Lower Body Bathing  Perineal  : Contact guard assistance;Minimum assistance  Position Performed: Seated on toilet;Standing  Lower Body : Minimum assistance; Moderate assistance  Position Performed: Other (comment) (sitting on commode)     Upper Body [address]: Supervision/ set-up Functional Transfers  Bathroom Mobility: Minimum assistance  Toilet Transfer : Contact guard assistance;Minimum assistance   Lower Body Dressing Assistance  Socks: Total assistance (dependent) Bed/Mat Mobility  Supine to Sit: Minimum assistance  Sit to Stand: Minimum assistance          Most Recent Physical Functioning:   Gross Assessment:                  Posture:  Posture (WDL): Exceptions to WDL  Posture Assessment: Forward head, Rounded shoulders  Balance:  Sitting: Impaired  Sitting - Static: Fair (occasional)  Sitting - Dynamic: Fair (occasional)  Standing: Impaired  Standing - Static: Fair  Standing - Dynamic : Poor Bed Mobility:  Supine to Sit: Minimum assistance  Wheelchair Mobility:     Transfers:  Sit to Stand: Minimum assistance                    Patient Vitals for the past 6 hrs:   BP BP Patient Position SpO2 Pulse   02/24/17 1118 134/69 - 95 % 93   02/24/17 1200 166/73 At rest;Supine 93 % 69        Mental Status  Neurologic State: Alert  Orientation Level: Oriented to person, Oriented to place, Oriented to situation  Cognition: Appropriate decision making, Follows commands  Perception: Appears intact  Perseveration: No perseveration noted  Safety/Judgement: Decreased awareness of environment                               Physical Skills Involved:  1. Range of Motion  2. Balance  3. Mobility  4. Strength  5. Endurance  6. Fine or Gross Motor Coordination  7. Dexterity Cognitive Skills Affected (resulting in the inability to perform in a timely and safe manner):  1. Attending  2. Thinking  3. Problem Solving  4. Mental Sequencing  5. Learning  6. Remembering Psychosocial Skills Affected:  1. Habits  2.  Routines and Behaviors   Number of elements that affect the Plan of Care: 5+:  HIGH COMPLEXITY   CLINICAL DECISION MAKIN [facility] Mobility Inpatient Short Form  How much help from another person does the patient currently need. .. Total A Lot A Little None   1. Putting on and taking off regular lower body clothing? [X] 1   [ ] 2   [ ] 3   [ ] 4   2. Bathing (including washing, rinsing, drying)? [ ] 1   [X] 2   [ ] 3   [ ] 4   3. Toileting, which includes using toilet, bedpan or urinal?   [X] 1   [ ] 2   [ ] 3   [ ] 4   4. Putting on and taking off regular upper body clothing?   [ ] 1   [X] 2   [ ] 3   [ ] 4   5. Taking care of personal grooming such as brushing teeth? [ ] 1   [X] 2   [ ] 3   [ ] 4   6. Eating meals? [ ] 1   [X] 2   [ ] 3   [ ] 4   © , Trustees of [address] [place] [address], under license to [employer]. All rights reserved    Score:  Initial: CL Most Recent: X (Date: -- )     Interpretation of Tool:  Represents activities that are increasingly more difficult (i.e. Bed mobility, Transfers, Gait). Score 24 23 22-20 19-15 14-10 9-7 6       Modifier CH CI CJ CK CL CM CN         · Self Care:               - CURRENT STATUS:    CL - 60%-79% impaired, limited or restricted               - GOAL STATUS:           CK - 40%-59% impaired, limited or restricted               - D/C STATUS:                       ---------------To be determined---------------  Payor: [employer] MEDICARE / Plan: [address] [place] [address] HMO / Product Type: Managed Care Medicare /       Medical Necessity:     · Patient demonstrates good rehab potential due to higher previous functional level. Reason for Services/Other Comments:  · Patient continues to require skilled intervention due to patient's inability to take care of self.    Use of outcome tool(s) and clinical judgement create a POC that gives a: MODERATE COMPLEXITY             TREATMENT:   (In addition to Assessment/Re-Assessment sessions the following treatments were rendered)      Pre-treatment Symptoms/Complaints:    Pain: Initial:   Pain Intensity 1: 0  Post Session:        Therapeutic Activity: (10 minutes): Therapeutic activities including Bed transfers, Chair transfers, Toilet Transfers and static/dynamic standing to improve mobility, strength, balance and activity tolerance. Required minimal assistance to promote static and dynamic balance in standing. Self Care: (15 minutes): Procedure(s) (per grid) utilized to improve and/or restore self-care/home management as related to dressing, bathing and grooming. Required moderate verbal, manual and tactile cueing to facilitate activities of daily living skills and compensatory activities. All exercises were completed with yellow theraband Date:  2/22/17 Date:   Date:     Activity/Exercise Parameters Parameters Parameters   Shoulder flexion/extension 15 reps     Shoulder horizontal add/abb 15 reps     Punches 15 reps     Elbow flexion/extension 15 reps                  Braces/Orthotics/Lines/Etc:   · IV  · O2 Device: Room air  Treatment/Session Assessment:    · Response to Treatment:  Patient tolerated treatment well. · Interdisciplinary Collaboration:  · Certified Occupational Therapy Assistant and Registered Nurse  · After treatment position/precautions:  · Up in chair, Bed alarm/tab alert on, Bed/Chair-wheels locked, Call light within reach and Family at bedside  · Compliance with Program/Exercises: Will assess as treatment progresses. · Recommendations/Intent for next treatment session: \"Next visit will focus on advancements to more challenging activities and reduction in assistance provided\".   Total Treatment Duration:  OT Patient Time In/Time Out  Time In: 0935  Time Out: [address] Dejan Hernandez

## 2017-02-24 NOTE — PROGRESS NOTES
Dispo update:  Spoke to Ms. Sol Ramírez at [facility] ([phone]), and she will re-admit  Fabianaleshia Beck of room 201 on Saturday February 25, if he is stable for discharge.

## 2017-02-24 NOTE — ANESTHESIA PROCEDURE NOTES
Arterial Line Placement    Performed by: Jan Gill  Authorized by: Rodri CANNON     Pre-Procedure  Indications:  Arterial pressure monitoring and blood sampling  Preanesthetic Checklist: patient identified, risks and benefits discussed, anesthesia consent, site marked, patient being monitored, timeout performed and patient being monitored      Procedure:   Prep:  ChloraPrep  Seldinger Technique?: Yes    Orientation:  Left  Location:  Radial artery  Catheter size:  20 G  Number of attempts:  1    Assessment:   Post-procedure:  Sterile dressing applied  Patient Tolerance:  Patient tolerated the procedure well with no immediate complications

## 2017-02-24 NOTE — BRIEF OP NOTE
BRIEF OPERATIVE NOTE    Date of Procedure: 2/24/2017   Preoperative Diagnosis: Cholecystitis [K81.9]  Postoperative Diagnosis: Acute on chronic cholecystitis with empyema of gallbladder and gallbladder necrosis  Procedure(s):  OPEN CHOLECYSTECTOMY   Surgeon(s) and Role:     * Alexandria Choudhary MD - Primary     * Elizabeth Zelaya MD - Assisting            Surgical Staff:  Circ-1: Marko Bolivar RN  Scrub Tech-1: Liliana Tenorio  Scrub Tech-2: Alan Rodriguez  Scrub Tech-Relief: Eufemia Marroquin; Renny Gill CNA  Event Time In   Incision Start 1419   Incision Close 1652     Anesthesia: General   Estimated Blood Loss: 200  Specimens:   ID Type Source Tests Collected by Time Destination   1 : gallbladder Preservative Gallbladder  Alexandria Choudhary MD 2/24/2017 1617 Pathology   1 : gallbladder fluid Wound Gallbladder CULTURE, ANAEROBIC, CULTURE, WOUND W GRAM STAIN Alexandria Choudhary MD 2/24/2017 1433 Microbiology      Findings: necrotic, liquifying gallbladder with purulent bile in it.   Taken top down and cystic duct ligated   Complications: none intraop  Implants: * No implants in log *

## 2017-02-24 NOTE — ROUTINE PROCESS
TRANSFER - IN REPORT:    Verbal report received from FRANCESCA Simmons   on Acey Ali  being received from 2nd floor  for ordered procedure      Report consisted of patients Situation, Background, Assessment and   Recommendations(SBAR). Information from the following report(s) SBAR, Kardex, Intake/Output, MAR, Recent Results and Med Rec Status was reviewed with the receiving nurse. Screening Assessment for C Diff:     1. Three (3) or more diarrheal (liquid unformed) stools in less than 24 hours  no     2. If yes, has patient off laxatives for more than 24 hours? Not applicable     3. Was a stool specimen sent for C. Difficile toxin A and B? Not applicable     4. Was the patient placed on contact isolation? Not applicable    Opportunity for questions and clarification was provided. Assessment completed upon patients arrival to unit and care assumed.

## 2017-02-25 PROBLEM — I51.89 ATRIAL MASS: Status: RESOLVED | Noted: 2017-01-01 | Resolved: 2017-01-01

## 2017-02-25 NOTE — PROGRESS NOTES
Pharmacokinetic Consult to Pharmacist    Elyssa Leyva is a 80 y.o. male being treated for cholecystitis with vancomycin and meropenem. Height: 5' 9\" (175.3 cm)  Weight: 78.8 kg (173 lb 11.2 oz)  Lab Results   Component Value Date/Time    BUN 18 02/23/2017 05:00 AM    Creatinine 1.14 02/24/2017 11:22 PM    WBC 8.7 02/23/2017 05:00 AM    Procalcitonin 1.0 02/20/2017 09:41 AM      Estimated Creatinine Clearance: 50 mL/min (based on Cr of 1.14). Cultures:  2/16: BCx - NGX5D, final   UCx - NGX2D, final  2/18: BCx - NGX5D, final  2/24: Wound cx - pending      Lab Results   Component Value Date/Time    Vancomycin,trough 19.8 02/24/2017 11:22 PM       Day 10 of vancomycin. Goal trough is 15-20. Trough drawn 18 hrs after last dose at high end of goal. Will reduce to 1000 mg IV q18h from 1250 mg IV q18h. Further levels will be ordered as clinically indicated. Pharmacy will continue to follow. Please call with any questions.     Thank you,  Bairon Castro, PharmD  Clinical Pharmacist  [phone]

## 2017-02-25 NOTE — PROGRESS NOTES
Hospitalist Progress Note    Subjective:   Daily Progress Note: 2/25/2017 8:43 AM    Mr. Priscila Dang is an 80year old white male who [facility]16 from [facility] for chest pain. Was initially admitted to cardiology but cardiac etiology of chest pain ruled out and thus he was transferred to the Hospitalist service. Findings thus far are concerning for cholecystitis and he is on IV vancomycin and merrem. Mental status is back to normal. Urine/blood cultures with no growth to date. Flu negative. S/p Cholecystectomy with drain left in place. 2/25: reports abd pain around incision. Tolerated clears.       Current Facility-Administered Medications   Medication Dose Route Frequency    heparin (porcine) injection 5,000 Units  5,000 Units SubCUTAneous Q8H    oxyCODONE IR (ROXICODONE) tablet 10 mg  10 mg Oral Q4H PRN    oxyCODONE IR (ROXICODONE) tablet 5 mg  5 mg Oral Q4H PRN    morphine injection 2 mg  2 mg IntraVENous Q3H PRN    morphine injection 5 mg  5 mg IntraVENous Q4H PRN    metoprolol (LOPRESSOR) injection 5 mg  5 mg IntraVENous Q4H PRN    hydrALAZINE (APRESOLINE) 20 mg/mL injection 10 mg  10 mg IntraVENous Q6H PRN    promethazine (PHENERGAN) with saline injection 12.5 mg  12.5 mg IntraVENous Q4H PRN    albuterol-ipratropium (DUO-NEB) 2.5 MG-0.5 MG/3 ML  3 mL Nebulization QID PRN    acetaminophen (TYLENOL) tablet 1,000 mg  1,000 mg Oral Q6H    acetaminophen (TYLENOL) tablet 650 mg  650 mg Oral Q4H PRN    meropenem 500 mg in 0.9% sodium chloride 50 mL IVPB  500 mg IntraVENous Q6H    ALPRAZolam (XANAX) tablet 1 mg  1 mg Oral QHS PRN    traMADol (ULTRAM) tablet 50 mg  50 mg Oral QHS PRN    acetaminophen (TYLENOL) suppository 650 mg  650 mg Rectal Q4H PRN    vancomycin (VANCOCIN) 1250 mg in  ml infusion  1,250 mg IntraVENous Q18H    docusate sodium (COLACE) capsule 100 mg  100 mg Oral BID    0.9% sodium chloride infusion  100 mL/hr IntraVENous CONTINUOUS    sodium chloride (NS) flush 5-10 mL  5-10 mL IntraVENous PRN    aspirin delayed-release tablet 325 mg  325 mg Oral DAILY    atorvastatin (LIPITOR) tablet 40 mg  40 mg Oral QHS    carbidopa-levodopa (SINEMET)  mg per tablet 1 Tab  1 Tab Oral TID    magnesium oxide (MAG-OX) tablet 400 mg  400 mg Oral DAILY    memantine ER (NAMENDA XR) capsule 28 mg  28 mg Oral DAILY    midodrine (PROAMITINE) tablet 5 mg  5 mg Oral TID    pantoprazole (PROTONIX) tablet 40 mg  40 mg Oral ACB    promethazine (PHENERGAN) tablet 25 mg  25 mg Oral Q6H PRN    tamsulosin (FLOMAX) capsule 0.4 mg  0.4 mg Oral DAILY    insulin lispro (HUMALOG) injection   SubCUTAneous AC&HS    nitroglycerin (NITROSTAT) tablet 0.4 mg  0.4 mg SubLINGual Q5MIN PRN        Review of Systems  A comprehensive 12 point ROS was obtained and findings negative unless stated otherwise in above HPI    Objective:     Visit Vitals    /71 (BP 1 Location: Right arm, BP Patient Position: At rest)    Pulse 84    Temp 98.4 °F (36.9 °C)    Resp 18    Ht 5' 9\" (1.753 m)    Wt 78.8 kg (173 lb 11.2 oz)    SpO2 92%    BMI 25.65 kg/m2    O2 Flow Rate (L/min): 4 l/min O2 Device: Nasal cannula    Temp (24hrs), Av.5 °F (36.9 °C), Min:97.5 °F (36.4 °C), Max:100.1 °F (37.8 °C)          1901 -  0700  In: 5699 [I.V.:5699]  Out: 9751 [Urine:1060; Drains:30]    General: awake, alert, oriented to person and place, no acute distress, in good spirits  Eyes; non icteric, EOMI  Neck; supple  CV: RRR  PULM: CTAB  Abd; soft, non distended, surgical incision has bandage. Drain in place. Additional comments: All labs, notes and studies from the past 24 hours have been personally reviewed today by me.      Data Review    Recent Results (from the past 24 hour(s))   GLUCOSE, POC    Collection Time: 17 10:50 AM   Result Value Ref Range    Glucose (POC) 141 (H) 65 - 100 mg/dL   TYPE & SCREEN    Collection Time: 17  4:21 PM   Result Value Ref Range    Crossmatch Expiration 2017 ABO/Rh(D) O POSITIVE     Antibody screen NEG    POC CG8I    Collection Time: 02/24/17  4:25 PM   Result Value Ref Range    pH (POC) 7.314 (L) 7.35 - 7.45      pCO2 (POC) 44.6 35 - 45 MMHG    pO2 (POC) 230 (H) 80 - 105 MMHG    HCO3 (POC) 22.6 22.0 - 26.0 MMOL/L    sO2 (POC) 100 (H) 95 - 98 %    Base deficit (POC) 4 mmol/L    Sodium (POC) 138 138 - 146 MMOL/L    Potassium (POC) 4.3 3.5 - 5.1 MMOL/L    Glucose (POC) 196 (H) 70 - 105 MG/DL    Calcium, ionized (POC) 1.09 (L) 1.12 - 1.32 MMOL/L   HGB & HCT    Collection Time: 02/24/17  5:50 PM   Result Value Ref Range    HGB 9.8 (L) 13.6 - 17.2 g/dL    HCT 29.0 (L) 41.1 - 50.3 %   GLUCOSE, POC    Collection Time: 02/24/17 10:15 PM   Result Value Ref Range    Glucose (POC) 151 (H) 65 - 100 mg/dL   Clarissa Dangeloia    Collection Time: 02/24/17 11:22 PM   Result Value Ref Range    Vancomycin,trough 19.8 5 - 20 ug/mL   CREATININE    Collection Time: 02/24/17 11:22 PM   Result Value Ref Range    Creatinine 1.14 0.8 - 1.5 MG/DL   GLUCOSE, POC    Collection Time: 02/25/17  6:08 AM   Result Value Ref Range    Glucose (POC) 162 (H) 65 - 100 mg/dL         Assessment/Plan:     Principal Problem:    Chest pain (2/16/2017)    Active Problems:    Orthostatic hypotension (7/21/2014)      CAD (coronary artery disease) (7/21/2014)      Diabetes mellitus, type II, insulin dependent (HCC) (7/21/2014)      CKD (chronic kidney disease) stage 3, GFR 30-59 ml/min (7/21/2014)      Overview: Baseline Cr~1.9mg/dl      Debility (4/11/2015)      Acute encephalopathy (4/11/2015)      Fever (2/16/2017)      Leukocytosis (2/16/2017)      RUQ abdominal pain (2/20/2017)      Thrombocytopenia (HCC) (2/21/2017)      Atrial mass (2/23/2017)      Pacemaker (2/23/2017)      -Post Op care per Surgery  -continue vancomycin and merrem for now  -mental status greatly improved. -Cards did FELIX and ruled out Atrial mass  -Continue current meds and Insulin sliding scale.  Will nee to be placed back on Plavix when OK with surgery. D/w Surgery NP, Will transfer care to Surgery and sign off for now. Please call us with questions. Appreciate surgery taking over the patient's care. Dispo: DC To Facility per Surgery, bed on hold.         Signed By: Stormy Daniels MD     February 25, 2017

## 2017-02-25 NOTE — OP NOTES
[facility]   OPERATIVE REPORT       Name:  Lopez Soares   MR#:  [id number]   :  1934   Account #:  [de-identified]   Date of Adm:  02/15/2017       DATE OF OPERATION: 2017    PREOPERATIVE DIAGNOSIS: Acute cholecystitis. POSTOPERATIVE DIAGNOSIS: Acute on chronic cholecystitis with   empyema of the gallbladder and gallbladder full thickness   Necrosis, with contained perforation. SURGEON: Gordon Paul MD    ASSISTANT: Isidro Nur MD    ANESTHESIA: General.    BLOOD LOSS: 200 mL. URINE OUTPUT: 300 mL. FLUIDS: 2 liters. SPECIMENS: Gallbladder to Pathology for permanent. COMPLICATIONS: None. BRIEF DESCRIPTION OF THE FINDINGS   1. Necrotic, liquefying gallbladder that is encased in omentum   and liver. 2. Purulent bile aspirated at the beginning of the case. 3. Gallbladder taken from the top down and cystic duct ligated. 4. Drain left in the gallbladder fossa. BRIEF HISTORY: Mr. Ti Solares is an 51-year-old man who presented to   the hospital with chest and abdominal pain. He was worked up   from a cardiac standpoint, which was negative and was felt to   have biliary symptoms. CT scan showed gallbladder distention and   inflammation and an ultrasound showed inflammation without   stones. His exam was very equivocal given a high level of   sedation, but HIDA scan was abnormal showing nonfilling of the   gallbladder. His mental status cleared and he developed right   upper quadrant abdominal pain, consistent with his radiological   picture. He had been on Plavix so this was held and he was   consented for cholecystectomy today. The patient was evaluated in the preoperative holding area. We   discussed the planned operation, risks, benefits, and   alternatives. He was brought to the operating room and general   anesthesia was administered. The abdomen was prepped and draped   in a sterile fashion. A timeout was performed.     I began by creating supraumbilical incision and bluntly   dissected down to the fascia which was grasped and elevated. It   was incised sharply and stay sutures were placed. Peritoneum was   entered bluntly. Terry trocar was placed and the abdomen was   insufflated with carbon dioxide. Under direct visualization, 5 mm ports were placed in the   epigastrium, right mid abdomen, and right flank. The patient was   placed head up and right side up. On initial inspection, the   omentum was draped over the liver and tightly adhesed to it. This was carefully taken down with a combination of blunt   dissection and electrocautery. With dissection of this, I   entered a purulent cavity of abscess type fluid that was most   consistent with a contained gallbladder perforation. Further   dissection revealed full-thickness necrosis of the gallbladder   wall. On dissection, the gallbladder wall fell apart as it was   liquefying. Fluid was aspirated from it  and was   found to be foul-smelling, purulent bile, consistent with an   empyema of the gallbladder. I then began a slow and tedious   dissection, scoring the encasing fat, trying to peel it down and off   the gallbladder to identify the anatomy and begin with   cholecystectomy. This was very difficult as the planes were very   inflamed and the gallbladder was falling apart with retraction. I worked for a while and then felt that I would not be able to   completely finish this case laparoscopically. I therefore   converted to an open procedure. A right upper quadrant Kocher incision was made and   electrocautery used to dissect through subcutaneous tissues. The   anterior fascial layer was opened. The rectus muscle was divided   with cautery. The posterior fascia was opened. Bookwalter   retractor was placed. Once opened, I packed several laps above   the liver to push it down and out of the right upper quadrant. I   then continued my dissection.  The gallbladder was carefully   peeled off the liver with a combination of electrocautery and   blunt dissection. Hemostasis remained good. The omentum and   surrounding fats were peeled off the gallbladder anterior wall   in a nice inflammatory plane. I asked Dr. Lida Iverson to assist me in the   dissection as it was very difficult the deeper we got into the   dissection toward the infundibulum and the cystic duct area. Together, we dissected down and around the gallbladder,   carefully peeling off the inflammatory layer and other   surrounding inflammatory tissues. The cystic artery was   identified, clamped, and cauterized. It was then tied with a 2-0   silk suture. Eventually, we completely freed up the infundibulum   and the gallbladder was hanging by the cystic duct only. This   was also clamped and cut. It was then tied with a 2-0 silk   suture. The gallbladder was passed off field. The right upper   quadrant was irrigated with sterile saline until the return of   clear fluid. The gallbladder fossa was hemostatic. A 19 round   Shay drain was passed through the right flank, 5 mm port site   and dropped into the gallbladder fossa. The omentum was packed   up in this area as well. All fluid was evacuated. Hemostasis was   assured. Posterior fascia and peritoneum were closed with running #1 PDS   suture with a stitch being started on  either end and run to   each other in the middle; 30 mL of 0.25% with epinephrine was   instilled in the skin and subcutaneous tissues for postoperative   analgesia. The anterior fascia was closed with a separate set of   running #1 PDS sutures. Subcutaneous tissues were irrigated. Skin was closed with staples. The 10 mm port site of the   umbilicus was closed with interrupted 0 Vicryl suture. Sterile   dressings were applied. The patient was awakened, extubated, and taken to recovery in   stable condition. Sponge, instrument, and needle counts were   correct.         Rhett Noe MD      BS / TB   D:  02/24/2017   17:16 T:  02/24/2017   21:37   Job #:  [id number]

## 2017-02-25 NOTE — PROGRESS NOTES
TRANSFER - IN REPORT:    Verbal report received from Mike [facility] on [address]  being received from PACU for routine post - op      Report consisted of patients Situation, Background, Assessment and   Recommendations(SBAR). Information from the following report(s) SBAR, Kardex, OR Summary, Procedure Summary, Intake/Output, MAR and Recent Results was reviewed with the receiving nurse. Opportunity for questions and clarification was provided. Assessment completed upon patients arrival to unit and care assumed.

## 2017-02-25 NOTE — PROGRESS NOTES
PLAN:  IVF- . 9% Sodium Chloride @ 100cc/ hr  SCD  Follow labs  Clear Liquid Diet  Encourage IS  MARSHA Drain  IV Abx - Meropenem, Vancomycin  Pain/ Nausea control  Record UOP each shift  Heparin 5,000 q 8 hours  Will restart Plavix prior to [address] Dodd will sign off and General Surgery will take over care    ASSESSMENT:  Admit Date: 2/15/2017   1 Day Post-Op  Procedure(s):  CHOLECYSTECTOMY LAPAROSCOPIC     Principal Problem:    Chest pain (2/16/2017)    Active Problems:    Orthostatic hypotension (7/21/2014)      CAD (coronary artery disease) (7/21/2014)      Diabetes mellitus, type II, insulin dependent ([facility] [address] (7/21/2014)      CKD (chronic kidney disease) stage 3, GFR 30-59 ml/min (7/21/2014)      Overview: Baseline Cr~1.9mg/dl      Debility (4/11/2015)      Acute encephalopathy (4/11/2015)      Fever (2/16/2017)      Leukocytosis (2/16/2017)      RUQ abdominal pain (2/20/2017)      Thrombocytopenia (HCC) (2/21/2017)      Atrial mass (2/23/2017)      Pacemaker (2/23/2017)         SUBJECTIVE:  Pt awake in bed. No new complaints. Reports pain as controlled. Tolerating CLD without problems. Family at bedside. AF, VSS. OBJECTIVE:  Constitutional: Alert, cooperative,  no acute distress; appears stated age   Visit Vitals    /80    Pulse 99    Temp 98.1 °F (36.7 °C)    Resp 18    Ht 5' 9\" (1.753 m)    Wt 173 lb 11.2 oz (78.8 kg)    SpO2 98%    BMI 25.65 kg/m2     Eyes:Sclera are clear. ENMT: no external lesions gross hearing normal; no obvious neck masses, no ear or lip lesions  CV: RRR. Normal perfusion  Resp: No JVD. Breathing is  non-labored; no audible wheezing. GI: soft, appropriately tender, non-distended, abd dressing c/d/i, Shay drain - 250cc out last 24 hours     Musculoskeletal: unremarkable with normal function. No embolic signs or cyanosis.    Neuro:  Oriented to person and place; moves all 4; no focal deficits  Psychiatric: normal affect and mood, no memory impairment      Patient Vitals for the past 24 hrs:   BP Temp Pulse Resp SpO2   02/25/17 0732 131/80 98.1 °F (36.7 °C) 99 18 98 %   02/25/17 0333 135/71 98.4 °F (36.9 °C) 84 18 92 %   02/25/17 0000 165/81 100.1 °F (37.8 °C) 92 18 98 %   02/24/17 2000 125/76 98.7 °F (37.1 °C) (!) 107 18 93 %   02/24/17 1848 157/80 97.5 °F (36.4 °C) 84 18 93 %   02/24/17 1843 175/83 - 85 18 92 %   02/24/17 1838 170/82 - 80 18 -   02/24/17 1833 173/83 - 81 18 -   02/24/17 1828 161/78 - 75 18 -   02/24/17 1823 161/75 - 73 18 -   02/24/17 1818 158/71 - 75 18 -   02/24/17 1813 185/77 - 77 18 -   02/24/17 1808 164/79 - 76 18 -   02/24/17 1803 163/80 - 74 18 -   02/24/17 1758 171/84 - 70 18 -   02/24/17 1753 167/78 - 71 18 -   02/24/17 1748 170/88 - 72 18 -   02/24/17 1743 183/85 - 71 18 96 %   02/24/17 1738 171/81 - 72 18 93 %   02/24/17 1733 180/78 - 71 18 92 %   02/24/17 1728 165/81 - 73 18 94 %   02/24/17 1723 (!) 171/92 - 73 18 94 %   02/24/17 1718 171/86 - 74 18 95 %   02/24/17 1713 172/89 - 76 18 95 %   02/24/17 1708 175/89 - 79 18 96 %   02/24/17 1705 179/85 98.7 °F (37.1 °C) 85 18 94 %   02/24/17 1703 179/85 - - - -   02/24/17 1200 166/73 98.1 °F (36.7 °C) 69 18 93 %   02/24/17 1118 134/69 98.1 °F (36.7 °C) 93 16 95 %     Labs:  Recent Labs      02/24/17   2322  02/24/17   1750  02/23/17   0500   WBC   --    --   8.7   HGB   --   9.8*  9.4*   PLT   --    --   205   NA   --    --   144   K   --    --   3.8   CL   --    --   110*   CO2   --    --   24   BUN   --    --   18   CREA  1.14   --   1.05   GLU   --    --   122*     Maryse Reeves, [facility]    The patient was seen in conjunction with Dr. Elbert Butler who independently evaluated the patient, reviewed the chart and agreed with the assessment and plan.

## 2017-02-25 NOTE — PROGRESS NOTES
Chart reviewed and patient status discussed with Nurse Pamela Ervin. Patient declined to participate in physical therapy this morning noting increased pain. Nurse Pamela Ervin informed of patient's pain and notes she will respond appropriately. Will attempt physical therapy later as time and schedule allows.      Gareth Herrera, PT  2/25/2017

## 2017-02-26 NOTE — PROGRESS NOTES
PLAN:  IVF- . 9% Sodium Chloride @ 100cc/ hr  SCD  Follow labs  Clear Liquid Diet  Encourage IS  MARSHA Drain  IV Abx - Meropenem, Vancomycin  Pain/ Nausea control  Record UOP each shift  Heparin 5,000 q 8 hours  Will restart Plavix prior to [address] signed off and General Surgery to manage care    ASSESSMENT:  Admit Date: 2/15/2017   2 Days Post-Op  Procedure(s):  CHOLECYSTECTOMY LAPAROSCOPIC     Principal Problem:    Chest pain (2/16/2017)    Active Problems:    Orthostatic hypotension (7/21/2014)      CAD (coronary artery disease) (7/21/2014)      Diabetes mellitus, type II, insulin dependent ([facility] [address] (7/21/2014)      CKD (chronic kidney disease) stage 3, GFR 30-59 ml/min (7/21/2014)      Overview: Baseline Cr~1.9mg/dl      Debility (4/11/2015)      Acute encephalopathy (4/11/2015)      Fever (2/16/2017)      Leukocytosis (2/16/2017)      RUQ abdominal pain (2/20/2017)      Thrombocytopenia (HCC) (2/21/2017)      Pacemaker (2/23/2017)         SUBJECTIVE:  Pt awake in bed. C/o moderate post op abdominal pain. Reminded pt to ask for pain medication when needed. Tolerating CLD without problems. Denies nausea or vomiting. -flatus, -BM. Davis in place - 1050cc out last 24 hours. Family at bedside. AF, VSS. OBJECTIVE:  Constitutional: Alert, cooperative, no acute distress; appears stated age   Visit Vitals    /76 (BP 1 Location: Right arm, BP Patient Position: At rest)    Pulse 73    Temp 98 °F (36.7 °C)    Resp 19    Ht 5' 9\" (1.753 m)    Wt 173 lb 11.2 oz (78.8 kg)    SpO2 100%    BMI 25.65 kg/m2     Eyes:Sclera are clear. ENMT: no external lesions gross hearing normal; no obvious neck masses, no ear or lip lesions  CV: RRR. Normal perfusion  Resp: No JVD. Breathing is non-labored; no audible wheezing. GI: soft, appropriately tender, non-distended, abd dressing removed. [place] c/d/i, Shay drain - 390cc out last 24 hours   Musculoskeletal: unremarkable with normal function.  No embolic signs or cyanosis. Neuro: Oriented to person and place; moves all 4; no focal deficits  Psychiatric: normal affect and mood, no memory impairment    Patient Vitals for the past 24 hrs:   BP Temp Pulse Resp SpO2   02/26/17 0314 144/76 98 °F (36.7 °C) 73 19 100 %   02/25/17 2310 128/69 99.1 °F (37.3 °C) 83 19 99 %   02/25/17 2120 140/75 - 89 - -   02/25/17 1900 140/75 97.9 °F (36.6 °C) 89 19 99 %   02/25/17 1527 138/72 98.3 °F (36.8 °C) 74 19 96 %   02/25/17 1138 169/65 98.2 °F (36.8 °C) 79 19 94 %     Labs:  Recent Labs      02/24/17   2322  02/24/17   1750   HGB   --   9.8*   CREA  1.14   --        Lorena Liu, [facility]-BC    The patient was seen in conjunction with  who independently evaluated the patient, reviewed the chart and agreed with the assessment and plan.

## 2017-02-26 NOTE — PROGRESS NOTES
END OF SHIFT NOTE:    INTAKE/OUTPUT  02/24 0701 - 02/25 0700  In: 3649 [I.V.:3442]  Out: 5080 [Urine:810; Drains:30]  Voiding: NO  Catheter: YES  Drain:   Angela Len #1 02/24/17 Right Abdomen (Active)   Site Assessment Clean, dry, & intact 2/25/2017  1:46 PM   Dressing Status Clean, dry, & intact 2/25/2017  1:46 PM   Drainage Description Serosanguinous 2/25/2017  1:46 PM   Shay Drain Airleak No 2/25/2017  1:46 PM   Status Patent; Charged;Draining 2/25/2017  1:46 PM   Output (ml) 90 ml 2/25/2017  1:46 PM               Flatus: Patient does not have flatus present. Stool:  0 occurrences. Characteristics:       Emesis: 0 occurrences. Characteristics:        VITAL SIGNS  Patient Vitals for the past 12 hrs:   Temp Pulse Resp BP SpO2   02/25/17 1527 98.3 °F (36.8 °C) 74 19 138/72 96 %   02/25/17 1138 98.2 °F (36.8 °C) 79 19 169/65 94 %       Pain Assessment  Pain Intensity 1: 10 (02/25/17 1912)  Pain Location 1: Abdomen  Pain Intervention(s) 1: Medication (see MAR)  Patient Stated Pain Goal: 0    Ambulating  No    Shift report given to oncoming nurse at the bedside.     Mohini Victor RN

## 2017-02-27 NOTE — PROGRESS NOTES
Delilah Lechuga M.D. Colon and Rectal Surgeon  [address], [address] [place] [address], [facility]  Phone: [phone] Fax: [phone]      Monica George  [id number]    SUBJECTIVE:  Monica George is a 80y.o. year old male who I was asked to see regarding possible cholecystitis. On my initial evaluation, he was lethargic because of a recent pain medication administration, and history came from the chart and his daughter in the room. The patient developed chest pain on Wednesday of the week prior to admission. He came to the hospital and was admitted. He has been worked up from a cardiac standpoint and this was negative. Pain progressively moved from his chest, then into the center of his back, and then into his right shoulder. Daughter stated he has had abdominal pain for the last ~24 hours. No n/v. He has had fevers up to 101.7 and leukocytosis of 17 (2/16) in the hospital. Hospitalist saw the patient and started Tamiflu due to fevers, rigors, and diffuse body aches. Due to persistent symptoms, CT and Ultrasound were done. US showed no gallstones but some gallbladder wall thickening. HIDA showed a non-filling gallbladder. He was taken 2/24/17 for open cholecystectomy. Did well over weekend. Today, he reports abdominal soreness. No n/v with clears. No gas/BM per patient. PHYSICAL EXAM:   Patient Vitals for the past 24 hrs:   BP Temp Pulse Resp SpO2   02/27/17 0311 134/65 97.2 °F (36.2 °C) 100 19 96 %   02/26/17 2330 155/56 98.1 °F (36.7 °C) 88 18 98 %   02/26/17 1900 140/75 97.8 °F (36.6 °C) 74 18 90 %   02/26/17 1500 102/67 97.1 °F (36.2 °C) 88 18 97 %   02/26/17 1200 173/59 97.8 °F (36.6 °C) 93 18 98 %       General--awake, answers questions. Appears comfortable at rest  Abdomen--soft, nondistended. Mildly tender on palpation. No peritonitis. Improved from preop. Incisions clean, dry, and approximated, no erythema/drainage. MARSHA with serous fluid. UOP: 650/700  MARSHA: 45/95  BM: nr    Recent Labs      02/24/17   1750   HGB  9.8*   HCT  29.0*       Recent Labs      02/24/17   2322   CREA  1.14       UA with 30 protein, trace ketones, small bili, no LE or nitrites, bact negative  Trending troponins negative  Lipids normal  Urine cx 2/16/17--neg x2 days  Blood cx 2/16/17--neg x5 days  Blood cx 2/18/17--neg x 5 days  Blood cx 2/19/17--neg x 5 days  Gallbladder fluid culture 2/24/17--heavy e. coli  Flu negative  Procalcitonin 2/20/17--negative  Above labs reviewed by me. Echo 2/16/17--\"SUMMARY:  Left ventricle: Systolic function was at the lower limits of normal. Ejection fraction was estimated in the range of 50 % to 55 %. There were no regional wall motion abnormalities. There was mild concentric hypertrophy.  Right ventricle: There was mild pulmonary artery hypertension. Right atrium: The atrium was mildly dilated. There was a possible, large, spherical, solid, fixed mass in the atrial cavity. The possibility that this Is a tumor cannot be excluded.  Mitral valve: There was mild regurgitation. \"     IMAGING: Images reviewed by me. 2/15/17--CT angio: \"IMPRESSION:  No acute arterial findings. However, there is extensive coronary artery disease present.  Extensive bilateral pulmonary parenchymal disease, worse within the right lung. \"  2/18/17--CT abd/pelvis: \"IMPRESSION: There is evidence of mild gallbladder wall thickening with new fluid/stranding in the adjacent right upper quadrant, this is nonspecific but given history of abdominal pain and fever raises concern for cholecystitis. Consider further evaluation with right upper quadrant ultrasound if clinically warranted.   New small right pleural effusion with persistent parenchymal disease in the visualized lung bases. \"  2/19/17--RUQ US: \"IMPRESSION: Edematous appearing gallbladder wall thickening is nonspecific without evidence of cholelithiasis and reportedly negative sonographic Higgins's sign. Cholecystitis cannot be completely excluded, other possible etiology include but are not limited to volume overload, hepatic failure, or renal failure. \"  2/20/17--HIDA: \"IMPRESSION: No evidence of bile duct obstruction. The gallbladder is never visualized. The findings are concerning for acute cholecystitis, especially given the appearance of the gallbladder on recent CT and ultrasound. \"     ASSESSMENT:   Acute cholecystitis with gallbladder empyema and perforation   S/p open cholecystectomy  Abdominal pain, due to above--improved  Anemia, dilutional and phlebotomy  Thrombocytopenia--resolved  Leukocytosis--resolved  Mildly elevated LFTs  Hypokalemia  JIM--improved    PLAN:  --continue PO and IV pain medications. Continue home meds  --OOB, IS, mobilize more. PT consult  --Home meds for chronic CV issues. No acute issues  --advance to full liquids. Follow response  --mcgrath out.   Follow UOP and creat  --recheck CBC, CMP  --continue iv antibiotics for gallbladder infection  --continue IVF until tolerating diet.  --SW working on dispo  --restart plavix once stable       Lelo Trinh MD

## 2017-02-27 NOTE — PROGRESS NOTES
Responded to Rapid Response STAT. Pt was hypotensive, Hypoxic and Pale. ABG done. Pt BP is more normal now.   Pt placed on NC-4L

## 2017-02-27 NOTE — PROGRESS NOTES
Called Dr. Cassandra Radford informed of patients rapid response and that labs have been drawn.  No orders received at this time

## 2017-02-27 NOTE — PROGRESS NOTES
OT note:  Pt with recent Rapid Response called. Will re-attempt when pt is medically stable.   Any Delaney

## 2017-02-27 NOTE — PROGRESS NOTES
Responded to rapid response. Pt became hypotensive with syncopal episode after standing from chair. Also reports of hypoxia initially after standing as well. Pt placed in bed. Mentation improved slowly and hypotension resolved once laying flat in bed. ABG ordered and pt without hypoxia or hypercapnia. General:  Alert and oriented. No distress  Cardio:  rrr  Respiratory:  CTA  Abd:  Soft, NT, positive bs    Will give additional 500 cc bolus then resume ivf per prior order. Check orthostatics at shift change as likely orthostatic hypotension.   Continue midodrine dosing

## 2017-02-27 NOTE — PROGRESS NOTES
PT Note:  Patient noted to have Rapid response called this afternoon prior to attempted treatment. Will hold treatment today for patient to stabilize. Will follow up tomorrow am as patient is stable.   Thank you,  Radha Shepard DPT

## 2017-02-27 NOTE — PROGRESS NOTES
Dispo update:  Updated Ms. Meza Player that Mr. Ofelia Pool in room 201 is not yet stable for discharge back to [facility].

## 2017-02-27 NOTE — PROGRESS NOTES
Offered pastoral presence and support to patient's wife during Rapid Response procedure  Also assisted patient's wife in calling her daughter    Magaly MERCED Wolf, PhD  Renea Mary  [phone]

## 2017-02-28 NOTE — PROGRESS NOTES
END OF SHIFT NOTE:    INTAKE/OUTPUT  02/27 0701 - 02/28 0700  In: 2868 [I.V.:2868]  Out: 675 [Urine:525; Drains:150]  Voiding: YES  Catheter: YES  Drain:   Jennifer Solorzano #1 02/24/17 Right Abdomen (Active)   Site Assessment Clean, dry, & intact 2/27/2017 11:34 PM   Dressing Status Clean, dry, & intact 2/27/2017 11:34 PM   Drainage Description Yellow 2/27/2017 11:34 PM   Shay Drain Airleak No 2/25/2017 11:55 PM   Status Patent; Charged;Draining 2/27/2017 11:34 PM   Output (ml) 60 ml 2/27/2017 11:34 PM               Flatus: Patient does have flatus present. Stool:  0 occurrences. Characteristics:       Emesis: 0 occurrences. Characteristics:        VITAL SIGNS  Patient Vitals for the past 12 hrs:   Temp Pulse Resp BP SpO2   02/28/17 0300 97.4 °F (36.3 °C) 82 16 132/67 98 %   02/27/17 2310 97.8 °F (36.6 °C) 77 16 137/71 98 %   02/27/17 1948 - - - 104/83 93 %   02/27/17 1937 98.2 °F (36.8 °C) 81 16 115/61 95 %       Pain Assessment  Pain Intensity 1: 0 (02/28/17 0512)  Pain Location 1: Abdomen, Back  Pain Intervention(s) 1: Medication (see MAR)  Patient Stated Pain Goal: 0    Ambulating  No    Shift report given to oncoming nurse at the bedside.     Cindy Cee, RN

## 2017-02-28 NOTE — PROGRESS NOTES
Nelly Alegria M.D. Colon and Rectal Surgeon  [address], [address] [place] [address], [facility]ancas   Phone: [phone] Fax: [phone]      Meenakshi Singh  [id number]    SUBJECTIVE:  Meenakshi Singh is a 80y.o. year old male who I was asked to see regarding possible cholecystitis. On my initial evaluation, he was lethargic because of a recent pain medication administration, and history came from the chart and his daughter in the room. The patient developed chest pain on Wednesday of the week prior to admission. He came to the hospital and was admitted. He has been worked up from a cardiac standpoint and this was negative. Pain progressively moved from his chest, then into the center of his back, and then into his right shoulder. Daughter stated he has had abdominal pain for the last ~24 hours. No n/v. He has had fevers up to 101.7 and leukocytosis of 17 (2/16) in the hospital. Hospitalist saw the patient and started Tamiflu due to fevers, rigors, and diffuse body aches. Due to persistent symptoms, CT and Ultrasound were done. US showed no gallstones but some gallbladder wall thickening. HIDA showed a non-filling gallbladder. He was taken 2/24/17 for open cholecystectomy. Did well over weekend. Had hypotensive/syncopal episode with standing yesterday, responded to fluids. Had urinary retention last night, and also got an extra bolus overnight. Today, he reports mild abdominal soreness. No n/v. Ate some. No gas/BM per patient.     PHYSICAL EXAM:   Patient Vitals for the past 24 hrs:   BP Temp Pulse Resp SpO2   02/28/17 0300 132/67 97.4 °F (36.3 °C) 82 16 98 %   02/27/17 2310 137/71 97.8 °F (36.6 °C) 77 16 98 %   02/27/17 1948 104/83 - - - 93 %   02/27/17 1937 115/61 98.2 °F (36.8 °C) 81 16 95 %   02/27/17 1513 112/58 98.5 °F (36.9 °C) 84 18 95 %   02/27/17 1420 113/69 - 74 - 97 %   02/27/17 1407 123/66 - 84 - 100 %   02/27/17 1405 96/43 - - - -   02/27/17 1403 (!) 78/44 - - - -   02/27/17 1400 (!) 71/40 - - - -   02/27/17 1149 99/62 97.7 °F (36.5 °C) 78 18 98 %   02/27/17 0733 149/61 97.6 °F (36.4 °C) 88 16 98 %       General--awake, answers questions. Appears comfortable at rest  Abdomen--soft, nondistended. Mildly tender on palpation. No peritonitis. Improved from preop. Incisions clean, dry, and approximated, no erythema/drainage. MARSHA with serous fluid. UOP: 375/150  MARSHA: 90/60  BM: nr    Recent Labs      02/27/17   1435   WBC  6.9   HGB  8.5*   HCT  26.0*   PLT  274       Recent Labs      02/27/17   1435   NA  143   K  4.0   CL  108*   CO2  26   BUN  19   CREA  1.28   TBILI  0.5   SGOT  58*   ALT  21   AP  253*       UA with 30 protein, trace ketones, small bili, no LE or nitrites, bact negative  Trending troponins negative  Lipids normal  Urine cx 2/16/17--neg x2 days  Blood cx 2/16/17--neg x5 days  Blood cx 2/18/17--neg x 5 days  Blood cx 2/19/17--neg x 5 days  Gallbladder fluid culture 2/24/17--heavy e. coli  Flu negative  Procalcitonin 2/20/17--negative  Above labs reviewed by me. Echo 2/16/17--\"SUMMARY:  Left ventricle: Systolic function was at the lower limits of normal. Ejection fraction was estimated in the range of 50 % to 55 %. There were no regional wall motion abnormalities. There was mild concentric hypertrophy.  Right ventricle: There was mild pulmonary artery hypertension. Right atrium: The atrium was mildly dilated. There was a possible, large, spherical, solid, fixed mass in the atrial cavity. The possibility that this Is a tumor cannot be excluded.  Mitral valve: There was mild regurgitation. \"     IMAGING: Images reviewed by me. 2/15/17--CT angio: \"IMPRESSION:  No acute arterial findings. However, there is extensive coronary artery disease present.  Extensive bilateral pulmonary parenchymal disease, worse within the right lung. \"  2/18/17--CT abd/pelvis: \"IMPRESSION: There is evidence of mild gallbladder wall thickening with new fluid/stranding in the adjacent right upper quadrant, this is nonspecific but given history of abdominal pain and fever raises concern for cholecystitis. Consider further evaluation with right upper quadrant ultrasound if clinically warranted.   New small right pleural effusion with persistent parenchymal disease in the visualized lung bases. \"  2/19/17--RUQ US: \"IMPRESSION: Edematous appearing gallbladder wall thickening is nonspecific without evidence of cholelithiasis and reportedly negative sonographic Higgins's sign. Cholecystitis cannot be completely excluded, other possible etiology include but are not limited to volume overload, hepatic failure, or renal failure. \"  2/20/17--HIDA: \"IMPRESSION: No evidence of bile duct obstruction. The gallbladder is never visualized. The findings are concerning for acute cholecystitis, especially given the appearance of the gallbladder on recent CT and ultrasound. \"     ASSESSMENT:   Acute cholecystitis with gallbladder empyema and perforation   S/p open cholecystectomy  Abdominal pain, due to above--improved  Anemia, dilutional and phlebotomy  Thrombocytopenia--resolved  Urinary retention--  Leukocytosis--resolved  Mildly elevated LFTs  Hypokalemia  JIM--improved    PLAN:  --continue PO and IV pain medications. Continue home meds  --OOB, IS, mobilize more. PT consult  --Home meds for chronic CV issues. No acute issues  --keep on full liquids. Follow response  --mcgrath back in.   Follow UOP and creat  --recheck H&H, bmp tomorrow  --continue iv antibiotics for gallbladder infection  --continue IVF until tolerating diet.  --SW working on dispo  --restart plavix once stable       Andrae Barboza MD

## 2017-02-28 NOTE — PROGRESS NOTES
Date of Outreach Update:  Cristhian Burden was seen and assessed. Previous Outreach assessment has been reviewed. Patient with slight improvement since the completion of the last dated Outreach assessment. Will continue to follow up per outreach protocol.     Signed By:   Mary Santamaria RN    February 28, 2017 6:31 PM

## 2017-02-28 NOTE — PROGRESS NOTES
Problem: Mobility Impaired (Adult and Pediatric)  Goal: *Acute Goals and Plan of Care (Insert Text)  LTG:  (1.)Mr. Iza Segovia will move from supine to sit and sit to supine , scoot up and down and roll side to side with MODIFIED INDEPENDENCE within 7 day(s). (2.)Mr. Iza Segovia will transfer from bed to chair and chair to bed with CONTACT GUARD ASSIST using the least restrictive device within 7 day(s). (3.)Mr. Iza Segovia will ambulate with CONTACT GUARD ASSIST for 5 feet with the least restrictive device within 7 day(s). (4.)Mr. Iza Segovia will propel manual wheelchair with B hands and feet for 150 within 7 days for increased mobility and strength.   _____________________________________________________________________________________________      PHYSICAL THERAPY: Daily Note, Treatment Day: 4th and AM 2/28/2017  INPATIENT: Hospital Day: 14  Payor: Murali Barrientos / Plan: [address] [place] [address] HMO / Product Type: [employer] Care Medicare /      NAME/AGE/GENDER: Jamie Armenta is a 80 y.o. male       PRIMARY DIAGNOSIS: Cholecystitis [K81.9] Chest pain Chest pain  Procedure(s) (LRB):  CHOLECYSTECTOMY LAPAROSCOPIC  (N/A)  4 Days Post-Op  ICD-10: Treatment Diagnosis:       · Generalized Muscle Weakness (M62.81)  · Difficulty in walking, Not elsewhere classified (R26.2)   Precaution/Allergies:  Naprosyn [naproxen] and Pravastatin       ASSESSMENT:      Mr. Iza Segovia was supine in bed upon arrival and agreeable to PT. Patient's BP was taken during all mobility and documented by RN in flow sheets. Patient needed moderate assist to transfer to sitting today. Patient did report dizzy symptoms when moving and reached out for therapists legs and hands. Patient sat edge of bed for symptoms to pass, then agreed to stand. Patient did well with standing and ambulated to recliner chair. Encouraged patient to perform LE exercises and ankle pumps; however, patient was again feeling dizzy.   Patient's legs reclined and BP taken again with improvements. Patient is orthostatic with mobility and needs additional time with movement. Patient was left in recliner chair at end of session. No progress made this treatment, hopefully once patient is feeling better, will progress with mobility. Assisted patient back to bed this am with assistance from CNA. Patient required more assistance and did not move as well as earlier treatment. Patient quickly laid back down after performing a stand pivot transfe back to bed. Patient positioned for comfort, with needs in reach. This section established at most recent assessment   PROBLEM LIST (Impairments causing functional limitations):  1. Decreased Strength  2. Decreased ADL/Functional Activities  3. Decreased Transfer Abilities  4. Decreased Ambulation Ability/Technique  5. Decreased Balance  6. Decreased Activity Tolerance  7. Increased Fatigue  8. Decreased Cognition    INTERVENTIONS PLANNED: (Benefits and precautions of physical therapy have been discussed with the patient.)  1. Balance Exercise  2. Bed Mobility  3. Family Education  4. Gait Training  5. Home Exercise Program (HEP)  6. Therapeutic Activites  7. Therapeutic Exercise/Strengthening  8. Transfer Training  9. Group Therapy      TREATMENT PLAN: Frequency/Duration: 3 times a week for duration of hospital stay  Rehabilitation Potential For Stated Goals: GOOD      RECOMMENDED REHABILITATION/EQUIPMENT: (at time of discharge pending progress): Continue Skilled Therapy. HISTORY:   History of Present Injury/Illness (Reason for Referral):  Jessica Coreas is a 80 y.o. male who presents to the ER from [facility] with complaints of chest pain and back pain. He states chest pain started last night. Pain is sharp in nature and radiates to the right side of his chest. He denies any dyspnea, nausea or diaphoresis. He also complains of severe back pain that started this morning. He states the chest pain was no better with NTG.  He states Morphine is the only thing that helped. He has known CAD. He states he has not had chest pain in quite some time. He has orthostatic hypotension and is on chronic midodrine. Past Medical History/Comorbidities:   Mr. Aliyah Levin  has a past medical history of CAD (coronary artery disease); Diabetes ([address] Hypertension; Neurological disorder; and Psychiatric disorder. Mr. Aliyah Levin  has a past surgical history that includes cardiac surg procedure unlist.  Social History/Living Environment:   Home Environment: Long term care  # Steps to Enter: 0  One/Two Story Residence: One story  Living Alone: No  Support Systems: Family member(s)  Patient Expects to be Discharged to[de-identified] Skilled nursing facility  Current DME Used/Available at Home: Wheelchair (mainly uses w/c; able to SPT with assist)  Tub or Shower Type: Shower  Prior Level of Function/Work/Activity:  Lives at [facility], uses wheelchair for mobility, required assist for ADLs  Personal Factors:          Sex:  male        Age:  80 y.o. Number of Personal Factors/Comorbidities that affect the Plan of Care:  Age, dementia, CAD, DM 3+: HIGH COMPLEXITY   EXAMINATION:   Most Recent Physical Functioning:   Gross Assessment:                  Posture:     Balance:  Sitting: Impaired  Sitting - Static: Fair (occasional)  Sitting - Dynamic: Poor (constant support)  Standing: Impaired  Standing - Static: Constant support; Fair  Standing - Dynamic : Fair Bed Mobility:  Rolling: Moderate assistance  Supine to Sit: Moderate assistance  Sit to Supine: Minimum assistance  Wheelchair Mobility:     Transfers:  Sit to Stand: Minimum assistance (moderate assist on return to bed)  Stand to Sit: Contact guard assistance;Minimum assistance  Stand Pivot Transfers: Moderate assistance  Bed to Chair: Minimum assistance (moderate on return to bed)  Interventions: Verbal cues; Tactile cues; Safety awareness training;Manual cues  Gait:     Base of Support: Widened  Speed/Deandra: Slow  Step Length: Right shortened;Left shortened  Gait Abnormalities: Decreased step clearance  Distance (ft): 3 Feet (ft)  Assistive Device: Walker, rolling  Ambulation - Level of Assistance: Minimal assistance  Interventions: Manual cues; Safety awareness training;Verbal cues; Visual/Demos       Body Structures Involved:  1. Joints  2. Muscles  3. Ligaments Body Functions Affected:  1. Movement Related Activities and Participation Affected:  1. General Tasks and Demands  2. Mobility  3. Self Care   Number of elements that affect the Plan of Care: 4+: HIGH COMPLEXITY   CLINICAL PRESENTATION:   Presentation: Stable and uncomplicated: LOW COMPLEXITY   CLINICAL DECISION MAKIN[address] [place] [address] AM-PAC 6 Clicks   Basic Mobility Inpatient Short Form  How much difficulty does the patient currently have. .. Unable A Lot A Little None   1. Turning over in bed (including adjusting bedclothes, sheets and blankets)? [ ] 1   [ ] 2   [X] 3   [ ] 4   2. Sitting down on and standing up from a chair with arms ( e.g., wheelchair, bedside commode, etc.)   [ ] 1   [X] 2   [ ] 3   [ ] 4   3. Moving from lying on back to sitting on the side of the bed? [ ] 1   [X] 2   [ ] 3   [ ] 4   How much help from another person does the patient currently need. .. Total A Lot A Little None   4. Moving to and from a bed to a chair (including a wheelchair)? [ ] 1   [ ] 2   [X] 3   [ ] 4   5. Need to walk in hospital room? [ ] 1   [ ] 2   [X] 3   [ ] 4   6. Climbing 3-5 steps with a railing? [ ] 1   [X] 2   [ ] 3   [ ] 4   © , Trustees of [address] [place] [address], under license to [employer]. All rights reserved    Score:  Initial: 15 Most Recent: X (Date: -- )     Interpretation of Tool:  Represents activities that are increasingly more difficult (i.e. Bed mobility, Transfers, Gait).        Score 24 23 22-20 19-15 14-10 9-7 6       Modifier CH CI CJ CK CL CM CN         · Mobility - Walking and Moving Around:               - CURRENT STATUS:    CK - 40%-59% impaired, limited or restricted               - GOAL STATUS:           CJ - 20%-39% impaired, limited or restricted               - D/C STATUS:                       ---------------To be determined---------------  Payor: Murali Barrientos / Plan: [address] [place] [address] HMO / Product Type: Managed Care Medicare /       Medical Necessity:     · Patient is expected to demonstrate progress in strength, range of motion, balance and coordination to decrease assistance required with overall functional mobility, transfers, ambulation. · Patient demonstrates good and fair rehab potential due to higher previous functional level. Reason for Services/Other Comments:  · Patient continues to require present interventions due to patient's inability to perform bed mobility, transfers safely and effectively at prior level of function of CGA. Use of outcome tool(s) and clinical judgement create a POC that gives a: Clear prediction of patient's progress: LOW COMPLEXITY                 TREATMENT:      Pre-treatment Symptoms/Complaints:  \"I'm okay\"   Pain: Initial:   Pain Intensity 1: 0  Pain Intervention(s) 1: Ambulation/Increased Activity, Nurse notified, Repositioned  Post Session:  None noted      Therapeutic Activity: (    15 Minutes, 10 Minutes (to return to bed)): Therapeutic activities including Bed transfers, chair transfers, ambulation on level ground, and sit to stand transfers to improve mobility, strength, balance and coordination. Required minimal-moderate Manual cues; Safety awareness training;Verbal cues; Visual/Demos to promote static and dynamic balance in standing and promote coordination of bilateral, upper extremity(s), lower extremity(s). Therapeutic Exercise: ( ):  Exercises per grid below to improve mobility and strength. Required minimal visual, verbal and tactile cues to promote proper body alignment and promote proper body mechanics. Progressed repetitions as indicated. Date:  2/23/17 Date:   Date:     Activity/Exercise Parameters Parameters Parameters   Seated AP 2 x 20 B     Seated marching 2 x 20 B     Seated hip abd/add 1 x 20 B     Seated LAQ 2 x 20 B                                Braces/Orthotics/Lines/Etc:   · O2 Device: Room air  Treatment/Session Assessment:    · Response to Treatment:  See above  · Interdisciplinary Collaboration:  · Physical Therapist, Registered Nurse and Certified Nursing Assistant/Patient Care Technician  · After treatment position/precautions:  · Up in chair, Supine in bed, Bed/Chair-wheels locked, Bed in low position, Call light within reach, Family at bedside and Posey alarm activated  · Compliance with Program/Exercises: Will assess as treatment progresses. · Recommendations/Intent for next treatment session: \"Next visit will focus on advancements to more challenging activities\".   Total Treatment Duration:  PT Patient Time In/Time Out  Time In: 0947 (1100)  Time Out: 1006 (1110)     BEHZAD VogelT

## 2017-02-28 NOTE — CONSULTS
Subjective:     Patient: Jessica Coreas MRN: [id number]  SSN: [id number]    YOB: 1934  Age: 80 y.o. Sex: male        HPI: Mr. Christine Kramer is a 79 yo WM with PMH of CAD (plavix held due to acute surgical issues) , Dm2, HTN, parkinsons disease postop 2-24-17 open cholecystectomy showing acute on chronic cholecystitis with empyema of gallbladder and contained perforation. Has had several episodes of mentation changes, rapid response called 2-27 due to hypotension and hypoxia after ambulating, started IVF bolus and continued midodrine. Had CODE S event today with ongoing change in mentation and focal right UE weakness/ right lower facial droop. CT head ordered showing possible subacute lacunar CVA. Tele neuro has evaluated. 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 [address]     Hypertension     Neurological disorder     parkinson    Psychiatric disorder       Past Surgical History:   Procedure Laterality Date    CARDIAC SURG PROCEDURE UNLIST        Prescriptions Prior to Admission   Medication Sig    polyethylene glycol (MIRALAX) 17 gram/dose powder Take 17 g by mouth every other day.  midodrine (PROAMITINE) 5 mg tablet Take 5 mg by mouth three (3) times daily.  insulin NPH/insulin regular (NOVOLIN 70/30) 100 unit/mL (70-30) injection 30 Units by SubCUTAneous route once.  therapeutic multivitamin (THERA) tablet Take 1 Tab by mouth daily.  cholecalciferol, VITAMIN D3, (VITAMIN D3) 5,000 unit tab tablet Take 5,000 Units by mouth daily.  nicotine (NICORETTE) 2 mg gum Take 2 mg by mouth every three (3) hours as needed for Smoking Cessation.  nitroglycerin (NITROSTAT) 0.4 mg SL tablet 0.4 mg by SubLINGual route every five (5) minutes as needed for Chest Pain.  calcium-cholecalciferol, d3, (CALCIUM 600 + D) 600-125 mg-unit tab Take 1 Tab by mouth daily.     magnesium oxide (MAG-OX) 400 mg tablet Take 400 mg by mouth daily.  promethazine (PHENERGAN) 25 mg tablet Take 25 mg by mouth every six (6) hours as needed for Nausea.  sennosides 8.6 mg cap Take 8.6 mg by mouth two (2) times a day.  tamsulosin (FLOMAX) 0.4 mg capsule Take 0.4 mg by mouth daily.  carbidopa-levodopa (SINEMET)  mg per tablet Take 1 Tab by mouth three (3) times daily.  metFORMIN (GLUCOPHAGE) 500 mg tablet Take 500 mg by mouth two (2) times daily (with meals).  memantine ER (NAMENDA XR) 28 mg capsule Take  by mouth daily.  omeprazole (PRILOSEC) 20 mg capsule Take 20 mg by mouth two (2) times a day.  ALPRAZolam (XANAX) 1 mg tablet Take 1 mg by mouth nightly.  aspirin delayed-release 81 mg tablet Take 325 mg by mouth daily.  clopidogrel (PLAVIX) 75 mg tablet Take 75 mg by mouth daily.  atorvastatin (LIPITOR) 80 mg tablet Take 40 mg by mouth nightly.  traMADol (ULTRAM) 50 mg tablet Take 50 mg by mouth nightly. Take 2 tabs by mouth at bedtime    acetaminophen (TYLENOL) 325 mg tablet Take 500 mg by mouth every four (4) hours as needed for Pain.      Current Facility-Administered Medications   Medication Dose Route Frequency    meropenem (MERREM) 500 mg in 0.9% sodium chloride (MBP/ADV) 50 mL  0.5 g IntraVENous Q8H    sodium chloride 0.9 % bolus infusion 500 mL  500 mL IntraVENous ONCE    ondansetron (ZOFRAN) injection 4 mg  4 mg IntraVENous Q4H PRN    heparin (porcine) injection 5,000 Units  5,000 Units SubCUTAneous Q8H    oxyCODONE IR (ROXICODONE) tablet 10 mg  10 mg Oral Q4H PRN    oxyCODONE IR (ROXICODONE) tablet 5 mg  5 mg Oral Q4H PRN    morphine injection 2 mg  2 mg IntraVENous Q3H PRN    morphine injection 5 mg  5 mg IntraVENous Q4H PRN    metoprolol (LOPRESSOR) injection 5 mg  5 mg IntraVENous Q4H PRN    hydrALAZINE (APRESOLINE) 20 mg/mL injection 10 mg  10 mg IntraVENous Q6H PRN    promethazine (PHENERGAN) with saline injection 12.5 mg  12.5 mg IntraVENous Q4H PRN    albuterol-ipratropium (DUO-NEB) 2.5 MG-0.5 MG/3 ML  3 mL Nebulization QID PRN    acetaminophen (TYLENOL) tablet 650 mg  650 mg Oral Q4H PRN    ALPRAZolam (XANAX) tablet 1 mg  1 mg Oral QHS PRN    traMADol (ULTRAM) tablet 50 mg  50 mg Oral QHS PRN    docusate sodium (COLACE) capsule 100 mg  100 mg Oral BID    0.9% sodium chloride infusion  125 mL/hr IntraVENous CONTINUOUS    sodium chloride (NS) flush 5-10 mL  5-10 mL IntraVENous PRN    aspirin delayed-release tablet 325 mg  325 mg Oral DAILY    atorvastatin (LIPITOR) tablet 40 mg  40 mg Oral QHS    carbidopa-levodopa (SINEMET)  mg per tablet 1 Tab  1 Tab Oral TID    magnesium oxide (MAG-OX) tablet 400 mg  400 mg Oral DAILY    memantine ER (NAMENDA XR) capsule 28 mg  28 mg Oral DAILY    midodrine (PROAMITINE) tablet 5 mg  5 mg Oral TID    pantoprazole (PROTONIX) tablet 40 mg  40 mg Oral ACB    tamsulosin (FLOMAX) capsule 0.4 mg  0.4 mg Oral DAILY    insulin lispro (HUMALOG) injection   SubCUTAneous AC&HS    nitroglycerin (NITROSTAT) tablet 0.4 mg  0.4 mg SubLINGual Q5MIN PRN     Allergies   Allergen Reactions    Naprosyn [Naproxen] Other (comments)    Pravastatin Other (comments)      Social History   Substance Use Topics    Smoking status: Never Smoker    Smokeless tobacco: Current User    Alcohol use No      No family history on file. Review of Systems  Complete review of systems was obtained and is otherwise negative unless stated in the HPI       I have reviewed all the pertinent medical and surgical history, social history, allergies, family history,  as well as pertinent labs and studies .       Objective:     Patient Vitals for the past 24 hrs:   BP Temp Pulse Resp SpO2   02/28/17 1537 105/56 - (!) 57 24 93 %   02/28/17 1502 126/57 99.7 °F (37.6 °C) 68 23 96 %   02/28/17 1122 103/49 98 °F (36.7 °C) 77 22 98 %   02/28/17 1001 124/50 - 74 - -   02/28/17 0958 (!) 85/46 - - - -   02/28/17 0954 92/42 - 74 - - 02/28/17 0950 103/49 - - - -   02/28/17 0728 129/65 97.5 °F (36.4 °C) 88 18 95 %   02/28/17 0300 132/67 97.4 °F (36.3 °C) 82 16 98 %   02/27/17 2310 137/71 97.8 °F (36.6 °C) 77 16 98 %   02/27/17 1948 104/83 - - - 93 %   02/27/17 1937 115/61 98.2 °F (36.8 °C) 81 16 95 %     02/28 0701 - 02/28 1900  In: 421 [I.V.:421]  Out: 405 [Urine:175; Drains:230]  02/26 1901 - 02/28 0700  In: 9155 [I.V.:3614]  Out: 8780 [Urine:1225; Drains:245]    Exam:  General: elderly, lethargic  Eyes: PERRLA  HEENT:  nasal septum midline  Neck: supple, symmetrical, trachea midline, no adenopathy,no carotid bruit and no JVD  Lungs: clear to auscultation bilaterally, good effort anterior exam   Heart: regular rate and rhythm, S1, S2 normal, no murmur, click, rub or gallop, no edema   Abdomen: soft, non-tender.  Bowel sounds normal.  Extremities: extremities normal, atraumatic, no cyanosis or edema  Skin: Skin color, texture, turgor normal. No rashes or lesions  Neurologic: lethargic and does not follow commands   Musculoskeletal: as above  Psychiatric: unable to assess     ECG:     Data Review (Labs):   Recent Results (from the past 24 hour(s))   GLUCOSE, POC    Collection Time: 02/27/17  9:47 PM   Result Value Ref Range    Glucose (POC) 180 (H) 65 - 100 mg/dL   GLUCOSE, POC    Collection Time: 02/28/17  5:49 AM   Result Value Ref Range    Glucose (POC) 116 (H) 65 - 100 mg/dL   GLUCOSE, POC    Collection Time: 02/28/17 10:56 AM   Result Value Ref Range    Glucose (POC) 100 65 - 100 mg/dL   GLUCOSE, POC    Collection Time: 02/28/17  2:56 PM   Result Value Ref Range    Glucose (POC) 137 (H) 65 - 100 mg/dL   GLUCOSE, POC    Collection Time: 02/28/17  3:36 PM   Result Value Ref Range    Glucose (POC) 138 (H) 65 - 100 mg/dL   CBC W/O DIFF    Collection Time: 02/28/17  3:45 PM   Result Value Ref Range    WBC 7.2 4.3 - 11.1 K/uL    RBC 2.63 (L) 4.23 - 5.67 M/uL    HGB 8.6 (L) 13.6 - 17.2 g/dL    HCT 26.4 (L) 41.1 - 50.3 %    .4 (H) 79.6 - 97.8 FL    MCH 32.7 26.1 - 32.9 PG    MCHC 32.6 31.4 - 35.0 g/dL    RDW 13.7 11.9 - 14.6 %    PLATELET 511 051 - 912 K/uL    MPV 10.5 (L) 10.8 - 78.1 FL   METABOLIC PANEL, BASIC    Collection Time: 02/28/17  3:45 PM   Result Value Ref Range    Sodium 143 136 - 145 mmol/L    Potassium 5.8 (H) 3.5 - 5.1 mmol/L    Chloride 109 (H) 98 - 107 mmol/L    CO2 19 (L) 21 - 32 mmol/L    Anion gap 15 7 - 16 mmol/L    Glucose 143 (H) 65 - 100 mg/dL    BUN 28 (H) 8 - 23 MG/DL    Creatinine 1.93 (H) 0.8 - 1.5 MG/DL    GFR est AA 43 (L) >60 ml/min/1.73m2    GFR est non-AA 36 (L) >60 ml/min/1.73m2    Calcium 7.7 (L) 8.3 - 10.4 MG/DL       Assessment / Plan:   Principal Problem:    Chest pain (2/16/2017)    Active Problems:    Orthostatic hypotension (7/21/2014)      CAD (coronary artery disease) (7/21/2014)      Diabetes mellitus, type II, insulin dependent (HCC) (7/21/2014)      CKD (chronic kidney disease) stage 3, GFR 30-59 ml/min (7/21/2014)      Overview: Baseline Cr~1.9mg/dl      Debility (4/11/2015)      Acute encephalopathy (4/11/2015)      Fever (2/16/2017)      Leukocytosis (2/16/2017)      RUQ abdominal pain (2/20/2017)      Thrombocytopenia (HCC) (2/21/2017)      Pacemaker (2/23/2017)        -STAT CT head completed and discussed with tele neuro  -will need to restart plavix when ok with surgery   -continue ASA, statin  -MRI brain ordered if able to complete with possible pacer device in place   -carotid duplex ordered   -TTT/FELIX completed 2,2017  -speech eval   -PT/OT and SW for dispo  -hold sedating meds, check UA/ammonia level/ abg  -check EKG now due to acute hyperkalemia, dose of kayexalate now, placed on remote tele    -continue hydration and follow renal function    DVT Prophylaxis:heparin  FEN:oral,IVF  Code Status: FULL  PMD:   Care Plan addressed:daughter    Signed By: Lennox Aye, MD     February 28, 2017

## 2017-02-28 NOTE — PROGRESS NOTES
Responded to call from primary RN regarding patient decline in mental status. Upon initial assessment put unable to state name and moaning with each breath. Patient able to squeeze both hands but significantly weaker on right side. Family at bedside and reports a previous CVA last year. Code S called. NIH scale performed, please refer to NIH flowsheet. , BP 120s/50s, O2sat 96% on 4L NC. No arm drift noted on left side, no effort against gravity on right side. Patient able to wiggle both feet but with some effort towards gravity on LLE but no effort made against gravity on RLE. Pupils are equal, round, with brisk reaction to light, slight facial droop noted to right side. MD at bedside to assess patient, patient taken for STAT CT by myself and ICU supervisor Declan Pardo RN.

## 2017-02-28 NOTE — PROGRESS NOTES
Rapid response called as patient found lethargic in recliner, hypotensive.   Patient placed in bed by staff, patient responding to voice commands, family and staff at bedside, call bell in reach

## 2017-02-28 NOTE — PROGRESS NOTES
Patient experienced a Rapid Response and was stabilized. Support was provided to his family.         [employer]

## 2017-03-01 PROBLEM — N17.9 AKI (ACUTE KIDNEY INJURY) (HCC): Status: ACTIVE | Noted: 2017-01-01

## 2017-03-01 PROBLEM — I63.9 CEREBROVASCULAR ACCIDENT (CVA) DUE TO EMBOLISM (HCC): Status: ACTIVE | Noted: 2017-01-01

## 2017-03-01 PROBLEM — E87.20 METABOLIC ACIDOSIS: Status: ACTIVE | Noted: 2017-01-01

## 2017-03-01 NOTE — PROGRESS NOTES
Dispo update:  Updated Ms. Sol Ramírez at [facility] (cell [id number]) that Mr. Miguel Angel Anaya is still here, does not appear stable for transfer yet. She said she will hold his bed as long as possible. This is a courtesy bed hold as the Medicaid bed hold has , and family is not paying for the bed hold. Updated Mr. Miguel Angel Anaya and family in room 201. As an aside, Mr. Porsha Arauz wife is also a resident at [facility].

## 2017-03-01 NOTE — PROGRESS NOTES
Monserrat Richards M.D. Colon and Rectal Surgeon  [facility] [address], [address] [facility], [facility] [address]   Phone: [phone] Fax: [phone]      Anton Low  [id number]    SUBJECTIVE:  Anton Low is a 80y.o. year old male who I was asked to see regarding possible cholecystitis. On my initial evaluation, he was lethargic because of a recent pain medication administration, and history came from the chart and his daughter in the room. The patient developed chest pain on Wednesday of the week prior to admission. He came to the hospital and was admitted. He has been worked up from a cardiac standpoint and this was negative. Pain progressively moved from his chest, then into the center of his back, and then into his right shoulder. Daughter stated he has had abdominal pain for the last ~24 hours. No n/v. He has had fevers up to 101.7 and leukocytosis of 17 (2/16) in the hospital. Hospitalist saw the patient and started Tamiflu due to fevers, rigors, and diffuse body aches. Due to persistent symptoms, CT and Ultrasound were done. US showed no gallstones but some gallbladder wall thickening. HIDA showed a non-filling gallbladder. He was taken 2/24/17 for open cholecystectomy. Had change in mental status yesterday, and stroke workup ensued. RN reported that tele-neuro doc felt picture was most consistent with encephalopathy. Daughter in room this AM, reports patient stable from yesterday. He is purposeful but does not answer questions. Not moving right arm. Davis in for urinary retention.   He does not answer any questions this AM.  Minimal PO intake yesterday    PHYSICAL EXAM:   Patient Vitals for the past 24 hrs:   BP Temp Pulse Resp SpO2   03/01/17 0710 120/70 98 °F (36.7 °C) 65 18 96 %   03/01/17 0300 124/55 99.2 °F (37.3 °C) 60 19 98 %   03/01/17 0040 122/62 - 60 20 99 %   02/28/17 2300 101/47 99.2 °F (37.3 °C) 60 19 92 %   02/28/17 2010 129/58 100.1 °F (37.8 °C) 61 20 90 %   02/28/17 1813 123/50 100.2 °F (37.9 °C) 60 24 96 %   02/28/17 1537 105/56 - (!) 57 24 93 %   02/28/17 1502 126/57 99.7 °F (37.6 °C) 68 23 96 %       General--awake, opens eyes and moves left arm purposefully, but does not answer questions or move right arm. Appears comfortable  Abdomen--soft, nondistended. Today, seems nontender on palpation. No peritonitis. Incisions clean, dry, and approximated, no erythema/drainage. MARSHA with serous fluid. UOP: 175/200+2  MARSHA: 175/200  BM: +1    Recent Labs      03/01/17   1018  02/28/17   1545  02/27/17   1435   WBC   --   7.2  6.9   HGB  8.0*  8.6*  8.5*   HCT  24.7*  26.4*  26.0*   PLT   --   233  274       Recent Labs      03/01/17   1018  02/28/17   1545  02/27/17   1435   NA  145  143  143   K  5.9*  5.8*  4.0   CL  110*  109*  108*   CO2  15*  19*  26   BUN  44*  28*  19   CREA  2.63*  1.93*  1.28   TBILI   --    --   0.5   SGOT   --    --   58*   ALT   --    --   21   AP   --    --   253*       UA with 30 protein, trace ketones, small bili, no LE or nitrites, bact negative  Trending troponins negative  Lipids normal  Urine cx 2/16/17--neg x2 days  Blood cx 2/16/17--neg x5 days  Blood cx 2/18/17--neg x 5 days  Blood cx 2/19/17--neg x 5 days  Gallbladder fluid culture 2/24/17--heavy e. coli  Flu negative  Procalcitonin 2/20/17--negative  UA 2/28/17--100+protein, trace ketone, small blood, small bili, small LE, 4+bacteria  Above labs reviewed by me. Echo 2/16/17--\"SUMMARY:  Left ventricle: Systolic function was at the lower limits of normal. Ejection fraction was estimated in the range of 50 % to 55 %. There were no regional wall motion abnormalities. There was mild concentric hypertrophy.  Right ventricle: There was mild pulmonary artery hypertension. Right atrium: The atrium was mildly dilated. There was a possible, large, spherical, solid, fixed mass in the atrial cavity. The possibility that this Is a tumor cannot be excluded.  Mitral valve:  There was mild regurgitation. \"     IMAGING: Images reviewed by me. 2/15/17--CT angio: \"IMPRESSION:  No acute arterial findings. However, there is extensive coronary artery disease present.  Extensive bilateral pulmonary parenchymal disease, worse within the right lung. \"  2/18/17--CT abd/pelvis: \"IMPRESSION: There is evidence of mild gallbladder wall thickening with new fluid/stranding in the adjacent right upper quadrant, this is nonspecific but given history of abdominal pain and fever raises concern for cholecystitis. Consider further evaluation with right upper quadrant ultrasound if clinically warranted.   New small right pleural effusion with persistent parenchymal disease in the visualized lung bases. \"  2/19/17--RUQ US: \"IMPRESSION: Edematous appearing gallbladder wall thickening is nonspecific without evidence of cholelithiasis and reportedly negative sonographic Higgins's sign. Cholecystitis cannot be completely excluded, other possible etiology include but are not limited to volume overload, hepatic failure, or renal failure. \"  2/20/17--HIDA: \"IMPRESSION: No evidence of bile duct obstruction. The gallbladder is never visualized. The findings are concerning for acute cholecystitis, especially given the appearance of the gallbladder on recent CT and ultrasound. \"  2/28/17--CT head: \"IMPRESSION:  EXTENSIVE SMALL VESSEL ISCHEMIC DISEASE WITH A NEW FOCAL HYPODENSITY CENTRALLY  IN THE LEFT CENTRUM SEMIOVALE COMPARED WITH APRIL 2015 WHICH COULD REPRESENT SUBACUTE ISCHEMIC LACUNAR INFARCTION IN THIS CLINICAL SETTING. IF FURTHER IMAGING EVALUATION IS CLINICALLY INDICATED AT THIS TIME, THEN MRI WOULD BE MOST USEFUL. \"     ASSESSMENT:   Acute cholecystitis with gallbladder empyema and perforation   S/p open cholecystectomy  Abdominal pain, due to above--improved  Anemia, dilutional and phlebotomy  Thrombocytopenia--resolved  Urinary retention--  Leukocytosis--resolved  Mildly elevated LFTs  Hypokalemia--resolved  Hyperkalemia  JIM--worsening    PLAN:  --continue PRN pain medications. Continue home meds. Minimize sedating meds  --OOB, IS, mobilize more. PT consult  --Home meds for chronic CV issues. No acute issues  --keep on full liquids and encourage PO if he is awake enough to eat/swallow. Follow response  --mcgrath back in. Follow UOP and creat. Will ask nephrology to see regarding worsening JIM despite hydration. Nephro consult, Corina and UCr already ordered by Dr Ariela Lance  --follow H&H, bmp. Transfuse for hgb <7 or s/s of hypoperfusion related to low Hgb  --continue iv antibiotics, stopped vanc yesterday.   Stop merrem tomorrow AM as he has gotten several days after nuno and WBC normalized  --continue IVF until tolerating diet.  --SW working on dispo  --ok to restart plavix from my standpoint  Verenice Kate MD

## 2017-03-01 NOTE — PROGRESS NOTES
[facility] CRITICAL CARE OUTREACH NURSE PROGRESS REPORT      SUBJECTIVE: Assessed patient secondary to nurse concern this afternoon. MEWS Score: 1 (02/28/17 0300)  Vitals:    02/28/17 1502 02/28/17 1537 02/28/17 1813 02/28/17 2010   BP: 126/57 105/56 123/50 129/58   Pulse: 68 (!) 57 60 61   Resp: 23 24 24 20   Temp: 99.7 °F (37.6 °C)  100.2 °F (37.9 °C) 100.1 °F (37.8 °C)   SpO2: 96% 93% 96% 90%   Weight:       Height:          EKG: normal EKG, normal sinus rhythm, sinus bradycardia. LAB DATA:    Recent Labs      02/28/17   1545  02/27/17   1435   NA  143  143   K  5.8*  4.0   CL  109*  108*   CO2  19*  26   AGAP  15  9   GLU  143*  197*   BUN  28*  19   CREA  1.93*  1.28   GFRAA  43*  >60   GFRNA  36*  57*   CA  7.7*  7.5*   ALB   --   1.9*   TP   --   6.0*   GLOB   --   4.1*   AGRAT   --   0.5*   ALT   --   21        Recent Labs      02/28/17   1545  02/27/17   1435   WBC  7.2  6.9   HGB  8.6*  8.5*   HCT  26.4*  26.0*   PLT  233  274          OBJECTIVE: On arrival to room, I found patient to be resting in bed, family at bedside. Pain Assessment  Pain Intensity 1: 0 (02/28/17 0947)  Pain Location 1: Abdomen, Back  Pain Intervention(s) 1: Ambulation/Increased Activity, Nurse notified, Repositioned  Patient Stated Pain Goal: 0    ASSESSMENT:  Patient lethargic, minimally responsive, will barely open eyes on command, does not produce any verbal effort, squeezed left hand very hard on command and moved both feet but would not squeeze right hand. Right facial droop noted. Respirations even and unlabored on 4L NC, O2 99%, breath sounds clear. Patient just received SPS enema for hyperkalemia. PLAN:  Will continue to follow per outreach protocol.

## 2017-03-01 NOTE — PROGRESS NOTES
Date of Outreach Update:  Silverio Cee was seen and assessed. Previous Outreach assessment has been reviewed. There have been no significant clinical changes since the completion of the last dated Outreach assessment. Will continue to follow up per outreach protocol.     Signed By:   Brandyn Rod RN    March 1, 2017 3:55 PM

## 2017-03-01 NOTE — PROGRESS NOTES
Hospitalist Progress Note    3/1/2017  Admit Date: 2/15/2017  8:09 AM   NAME: Meenakshi Singh   :  1934   MRN:  [id number]   Attending: Emani Chan MD  PCP:  Wilman Ovalles MD    SUBJECTIVE:   Patient is not answering questions, not opening eyes, not verbally responsive, occasionally squeezes with the left hand, not awake enough to take po      Review of Systems negative with exception of pertinent positives noted above  PHYSICAL EXAM     Visit Vitals    /70    Pulse 65    Temp 98 °F (36.7 °C)    Resp 18    Ht 5' 9\" (1.753 m)    Wt 78.8 kg (173 lb 11.2 oz)    SpO2 96%    BMI 25.65 kg/m2      Temp (24hrs), Av.4 °F (37.4 °C), Min:98 °F (36.7 °C), Max:100.2 °F (37.9 °C)    Oxygen Therapy  O2 Sat (%): 96 % (17 0710)  Pulse via Oximetry: 83 beats per minute (17 1848)  O2 Device: Nasal cannula (17 1705)  O2 Flow Rate (L/min): 4 l/min (17 1705)    Intake/Output Summary (Last 24 hours) at 17 1200  Last data filed at 17 0817   Gross per 24 hour   Intake              765 ml   Output              535 ml   Net              230 ml      General: No acute distress, but not answering questions  Lungs:  CTA Bilaterally.    Heart:  Regular rate and rhythm,  No murmur, rub, or gallop, 1+ generalized edema  Abdomen: Soft, Non distended, Non tender, Positive bowel sounds, MARSHA in R flank  Extremities: No cyanosis, clubbing or edema  Neurologic:  Not moving the right side, not answering question, squeezes left hand, but mostly not purposeful    [place] Problems    Diagnosis Date Noted    Pacemaker 2017    Thrombocytopenia ([facility] [address] 2017    RUQ abdominal pain 2017    Chest pain 2017    Fever 2017    Leukocytosis 2017    Debility 2015    Acute encephalopathy 2015    CAD (coronary artery disease) 2014    Orthostatic hypotension 2014    CKD (chronic kidney disease) stage 3, GFR 30-59 ml/min 07/21/2014     Baseline Cr~1.9mg/dl      Diabetes mellitus, type II, insulin dependent ([facility] [address] 07/21/2014     Plan:  AMS/Acute encephalopathy- unclear cause,  Ct with maybe subacute lacunar infarct, could be related to dementia and worsening renal function or lack of getting his medications  -if not improving will need ngt for meds and feeding in am    -JIM- increasing cr, nephrology consult, check urine na and cr for FENA    S/p cholecystitis- on merrem    Dm- riss    hypoalbumin- third spacing    Hyperkalemic    Metabolic acidosis- add bicarb to fluids    DVT Prophylaxis:     Signed By: Linh Matrino MD     March 1, 2017      IMPRESSION  Impression:   1. 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 Given  the differences in vascular distribution, the possibility of a proximal embolic  phenomena or vasculitis should be considered. 2. 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 3. Volume loss.   CVA note on MRI as above, consult neurology, on mri appeared to be embolic, recent echo 6/25, no masses, vegetations, clots,  ?need to FELIX

## 2017-03-01 NOTE — PROGRESS NOTES
Problem: Dysphagia (Adult)  Goal: *Acute Goals and Plan of Care (Insert Text)  STG: Pt will demonstrate zero signs/sx of aspiration with PO trials with SLP only. STG: Pt will complete a full speech, language and cognitive evaluation without assistance with 100% accuracy during session. STG: Pt will complete a Modified barium swallow study with zero signs/sx of aspiration with 100% accuracy during swallow study. LTG: Pt will demonstrate zero signs/sx of aspiration with least restrictive diet with 100% accuracy during all meals. SPEECH LANGUAGE PATHOLOGY: BEDSIDE SWALLOW NOTE: INITIAL ASSESSMENT     NAME/AGE/GENDER: Alejandra Segovia is a 80 y.o. male  DATE: 3/1/2017  PRIMARY DIAGNOSIS: Cholecystitis [K81.9]  Procedure(s) (LRB):  CHOLECYSTECTOMY LAPAROSCOPIC  (N/A) 5 Days Post-Op  ICD-10: Treatment Diagnosis: R13.12 oropharyngeal phase dysphagia  INTERDISCIPLINARY COLLABORATION: Registered Nurse and Physician  PRECAUTIONS/ALLERGIES: Naprosyn [naproxen] and Pravastatin   ASSESSMENT:   Based on the objective data described below, Mr. Kenia Barry presents with no command follow, no verbal attempts or swallow response to thin liquid via a spoon or ice on lips. Diet changed to NPO. Tray removed from room. Educated family on NPO at this time. Will follow. Patient will benefit from skilled intervention to address the below impairments. ?????? ? ? This section established at most recent assessment??????????  PROBLEM LIST (Impairments causing functional limitations):  1. dysphagia  REHABILITATION POTENTIAL FOR STATED GOALS: POOR      PLAN OF CARE:   Patient will benefit from skilled intervention to address the following impairments. RECOMMENDATIONS AND PLANNED INTERVENTIONS (Benefits and precautions of therapy have been discussed with the patient.):  · NPO  MEDICATIONS:  · Non-oral  COMPENSATORY STRATEGIES/MODIFICATIONS INCLUDING:  · None  OTHER RECOMMENDATIONS (including follow up treatment recommendations):    · Family training/education  · Patient education  · PO trials  RECOMMENDED DIET MODIFICATIONS DISCUSSED WITH:  · Hospitalist  · Nursing  · Family  FREQUENCY/DURATION: Continue to follow patient 3 times a week or until goals are met   RECOMMENDED REHABILITATION/EQUIPMENT: (at time of discharge pending progress):   Rehab. SUBJECTIVE:   nonresponsive  History of Present Injury/Illness: Mr. Kenia Barry  has a past medical history of CAD (coronary artery disease); Diabetes ([address] Hypertension; Neurological disorder; and Psychiatric disorder. He also  has a past surgical history that includes cardiac surg procedure unlist.   Present Symptoms:    Pain Intensity 1: 0  Pain Location 1: Abdomen, Back  Pain Orientation 1: Anterior, Posterior  Pain Intervention(s) 1: Ambulation/Increased Activity, Nurse notified, Repositioned  Current Medications:   No current facility-administered medications on file prior to encounter. Current Outpatient Prescriptions on File Prior to Encounter   Medication Sig Dispense Refill    calcium-cholecalciferol, d3, (CALCIUM 600 + D) 600-125 mg-unit tab Take 1 Tab by mouth daily.  magnesium oxide (MAG-OX) 400 mg tablet Take 400 mg by mouth daily.  promethazine (PHENERGAN) 25 mg tablet Take 25 mg by mouth every six (6) hours as needed for Nausea.  sennosides 8.6 mg cap Take 8.6 mg by mouth two (2) times a day.  tamsulosin (FLOMAX) 0.4 mg capsule Take 0.4 mg by mouth daily.  carbidopa-levodopa (SINEMET)  mg per tablet Take 1 Tab by mouth three (3) times daily.  metFORMIN (GLUCOPHAGE) 500 mg tablet Take 500 mg by mouth two (2) times daily (with meals).  memantine ER (NAMENDA XR) 28 mg capsule Take  by mouth daily.  omeprazole (PRILOSEC) 20 mg capsule Take 20 mg by mouth two (2) times a day.  ALPRAZolam (XANAX) 1 mg tablet Take 1 mg by mouth nightly.  aspirin delayed-release 81 mg tablet Take 325 mg by mouth daily.         clopidogrel (PLAVIX) 75 mg tablet Take 75 mg by mouth daily.  atorvastatin (LIPITOR) 80 mg tablet Take 40 mg by mouth nightly.  traMADol (ULTRAM) 50 mg tablet Take 50 mg by mouth nightly. Take 2 tabs by mouth at bedtime        acetaminophen (TYLENOL) 325 mg tablet Take 500 mg by mouth every four (4) hours as needed for Pain. Current Dietary Status:  fulls     Social History/Home Situation:   Home Environment: Long term care  # Steps to Enter: 0  One/Two Story Residence: One story  Living Alone: No  Support Systems: Family member(s)  Patient Expects to be Discharged to[de-identified] Skilled nursing facility  Current DME Used/Available at Home: Wheelchair (mainly uses w/c; able to SPT with assist)  Tub or Shower Type: Shower      OBJECTIVE:   Respiratory Status:           Oral Motor Structure/Speech:  Oral-Motor Structure/Motor Speech  Labial: Left droop  Dentition: Edentulous  Oral Hygiene: dry     Cognitive and Communication Status:  Neurologic State: Eyes do not open to any stimulus  Orientation Level: Unable to verbalize  Cognition: No command following        Safety/Judgement: Fall prevention     BEDSIDE SWALLOW EVALUATION  Oral Assessment:  Oral Assessment  Labial: Left droop  Dentition: Edentulous  Oral Hygiene: dry  P.O. Trials:  Patient Position: upright in bed     The patient was given teaspoon amounts of the following:   Consistency Presented: Thin liquid; Ice chips  How Presented: SLP-fed/presented     ORAL PHASE:  Bolus Acceptance: Absent  Bolus Formation/Control: Impaired  Propulsion: Absent           PHARYNGEAL PHASE:  Initiation of Swallow: Absent     OTHER OBSERVATIONS:  Rate/bite size: Impaired   Endurance:  Impaired      Comments:       Tool Used: Dysphagia Outcome and Severity Scale (GABRIEL)     Score Comments   Normal Diet  [ ] 7 With no strategies or extra time needed   Functional Swallow  [ ] 6 May have mild oral or pharyngeal delay         Mild Dysphagia     [ ] 5 Which may require one diet consistency restricted (those who demonstrate penetration which is entirely cleared on MBS would be included)   Mild-Moderate Dysphagia  [ ] 4 With 1-2 diet consistencies restricted         Moderate Dysphagia  [ ] 3 With 2 or more diet consistencies restricted         Moderately Severe Dysphagia  [X] 2 With partial PO strategies (trials with ST only)         Severe Dysphagia  [ ] 1 With inability to tolerate any PO safely            Score:  Initial: 2 Most Recent: X (Date: -- )   Interpretation of Tool: The Dysphagia Outcome and Severity Scale (GABRIEL) is a simple, easy-to-use, 7-point scale developed to systematically rate the functional severity of dysphagia based on objective assessment and make recommendations for diet level, independence level, and type of nutrition. Score 7 6 5 4 3 2 1   Modifier CH CI CJ CK CL CM CN   · Swallowing:               - CURRENT STATUS:           CM - 80%-99% impaired, limited or restricted               - GOAL STATUS:                   CJ - 20%-39% impaired, limited or restricted               - D/C STATUS:                       ---------------To be determined---------------  Payor: Brianda Ley / Plan: [address] [place] [address] HMO / Product Type: [employer] Medicare /       TREATMENT:         (In addition to Assessment/Re-Assessment sessions the following treatments were rendered)  Assessment/Reassessment only, no treatment provided today  MODALITIES:            ORAL MOTOR  EXERCISES:           LARYNGEAL / PHARYNGEAL EXERCISES:           __________________________________________________________________________________________________  Safety:   After treatment position/precautions:  · Call light within reach  · RN notified  · Family at bedside  · Upright in Bed  Treatment Assessment:    Progression/Medical Necessity:   · Patient demonstrates fair and poor rehab potential due to higher previous functional level. Compliance with Program/Exercises:  Will assess as treatment progresses. Reason for Continuation of Services/Other Comments:  · Patient continues to require skilled intervention due to dysphagia. Recommendations/Intent for next treatment session: \"Treatment next visit will focus on PO Trials\".     Total Treatment Duration:  Time In: 0815  Time Out: Teo Del Rio MA/CCC/SLP

## 2017-03-01 NOTE — PROGRESS NOTES
OT note: Attempted to see pt for treatment. Pt unable to arouse with family at bedside. Pt is scheduled for an MRI. Pt with RR on Monday and Code S called on Tuesday. Will follow up with pt post MRI results to determine if OT POC needs to be modified. ARIANE Munson    Addendum: MRI results noted. Pt will need re-assessment by OTR/L.   Tamika Steele

## 2017-03-01 NOTE — CONSULTS
Nephrology consult    Admission Date:  2/15/2017    Admission Diagnosis  Cholecystitis [K81.9]    History of Present Illness:  Mr. Ofelia Pool is a 81 yo WM with a history of DM, HTN, and CKD 3 who presented with epigastric pain and underwent cholecystectomy on 2/24 after a cardiac evaluation was unremarkable. After the open cholecystectomy he did well initially until he developed hypotension with an unresponsive episode. His renal function has worsened since that time with creatinine increasing from 1.3 to 2.6 with decreased UOP and development of metabolic acidosis with hyperkalemia. He was on Vancomycin previously with highest readings of 20. This afternoon Mr. Ofelia Pool had a MRI that demonstrated a number of acute stroked concerning either for embolic phenomenon or vasculitis. He is unresponsive. Family at bedside.     Past Medical History:   Diagnosis Date    CAD (coronary artery disease)     Diabetes ([facility]     Hypertension     Neurological disorder     parkinson    Psychiatric disorder       Past Surgical History:   Procedure Laterality Date    CARDIAC SURG PROCEDURE UNLIST        Current Facility-Administered Medications   Medication Dose Route Frequency    sodium bicarbonate (8.4%) 150 mEq in dextrose 5% 1,000 mL infusion   IntraVENous CONTINUOUS    meropenem (MERREM) 500 mg in 0.9% sodium chloride (MBP/ADV) 50 mL  0.5 g IntraVENous Q12H    ondansetron (ZOFRAN) injection 4 mg  4 mg IntraVENous Q4H PRN    heparin (porcine) injection 5,000 Units  5,000 Units SubCUTAneous Q8H    metoprolol (LOPRESSOR) injection 5 mg  5 mg IntraVENous Q4H PRN    hydrALAZINE (APRESOLINE) 20 mg/mL injection 10 mg  10 mg IntraVENous Q6H PRN    albuterol-ipratropium (DUO-NEB) 2.5 MG-0.5 MG/3 ML  3 mL Nebulization QID PRN    acetaminophen (TYLENOL) tablet 650 mg  650 mg Oral Q4H PRN    ALPRAZolam (XANAX) tablet 1 mg  1 mg Oral QHS PRN    traMADol (ULTRAM) tablet 50 mg  50 mg Oral QHS PRN    docusate sodium (COLACE) capsule 100 mg  100 mg Oral BID    sodium chloride (NS) flush 5-10 mL  5-10 mL IntraVENous PRN    aspirin delayed-release tablet 325 mg  325 mg Oral DAILY    atorvastatin (LIPITOR) tablet 40 mg  40 mg Oral QHS    carbidopa-levodopa (SINEMET)  mg per tablet 1 Tab  1 Tab Oral TID    memantine ER (NAMENDA XR) capsule 28 mg  28 mg Oral DAILY    midodrine (PROAMITINE) tablet 5 mg  5 mg Oral TID    pantoprazole (PROTONIX) tablet 40 mg  40 mg Oral ACB    tamsulosin (FLOMAX) capsule 0.4 mg  0.4 mg Oral DAILY    insulin lispro (HUMALOG) injection   SubCUTAneous AC&HS    nitroglycerin (NITROSTAT) tablet 0.4 mg  0.4 mg SubLINGual Q5MIN PRN     Allergies   Allergen Reactions    Naprosyn [Naproxen] Other (comments)    Pravastatin Other (comments)      Social History   Substance Use Topics    Smoking status: Never Smoker    Smokeless tobacco: Current User    Alcohol use No      No family history on file. Review of Systems  Unable to obtain    Objective:     Vitals:    03/01/17 0040 03/01/17 0300 03/01/17 0710 03/01/17 1135   BP: 122/62 124/55 120/70 141/57   Pulse: 60 60 65 60   Resp: 20 19 18 18   Temp:  99.2 °F (37.3 °C) 98 °F (36.7 °C) 97.9 °F (36.6 °C)   SpO2: 99% 98% 96% 99%   Weight:       Height:           Intake/Output Summary (Last 24 hours) at 03/01/17 1516  Last data filed at 03/01/17 1427   Gross per 24 hour   Intake             1811 ml   Output              510 ml   Net             1301 ml       Physical Exam  GEN :in no distress, unresponsive  HEENT: anicteric sclerae, eomi. Oropharynx without lesions. Mucous membranes are dry. Neck - supple without JVD, no thyromegaly. No lymphadenopathy.   CV - regular rate and rhythm, no murmur, no rub  Lung - clear bilaterally, lungs expand symmetrically  Chest wall - normal appearance  Abd - soft, nontender, bowel sounds present, no hepatosplenomegaly, MARSHA drain  Ext - no clubbing, no cyanosis, 1+ edema  Neurologic - nonfocal  Genitourinary - bladder nonpalpable  Skin - no rashes, no purpura, no ecchymoses  Psychiatric: Normal mood and affect. Data Review:   Recent Labs      03/01/17   1018  02/28/17   1545  02/27/17   1435   WBC   --   7.2  6.9   HGB  8.0*  8.6*  8.5*   HCT  24.7*  26.4*  26.0*   PLT   --   233  274     Recent Labs      03/01/17   1018  02/28/17   1545  02/27/17   1435   NA  145  143  143   K  5.9*  5.8*  4.0   CL  110*  109*  108*   CO2  15*  19*  26   BUN  44*  28*  19   CREA  2.63*  1.93*  1.28   GLU  186*  143*  197*   CA  8.0*  7.7*  7.5*     Recent Labs      02/28/17   1904   PH  7.43   PCO2  23*   PO2  83       Problem List:     Patient Active Problem List    Diagnosis Date Noted    Cerebrovascular accident (CVA) due to embolism ([address] 03/01/2017    JIM (acute kidney injury) ([address] 94/51/4302    Metabolic acidosis 12/63/4974    Pacemaker 02/23/2017    Thrombocytopenia ([address] 02/21/2017    RUQ abdominal pain 02/20/2017    Chest pain 02/16/2017    Fever 02/16/2017    Leukocytosis 02/16/2017    History of CVA (cerebrovascular accident) 07/19/2016    Presence of cardiac pacemaker 07/19/2016    Altered mental status 04/11/2015    Nausea & vomiting 04/11/2015    Debility 04/11/2015    Acute encephalopathy 04/11/2015    Syncope and collapse 07/21/2014    Orthostatic hypotension 07/21/2014    CAD (coronary artery disease) 07/21/2014    Diabetes mellitus, type II, insulin dependent ([address] 07/21/2014    CKD (chronic kidney disease) stage 3, GFR 30-59 ml/min 07/21/2014    Restless legs syndrome (RLS) 07/21/2014    Hyperlipidemia 07/21/2014    Post zoster neuralgia 07/21/2014       Impression:  Mr. Balbir Parekh has developed JIM in the setting of hypotension and acute stroke after cholecystectomy. I suspect ATN due to hypotension is the likely etiology of his JIM but he could also have embolic phenomenon to his kidneys. I will initiate a work up today for that.  His UA does not have sigificant WBC and the granular casts are more consistent with ATN. We will work on treating his hyperkalemia and acidosis medically. I discussed long term prognosis and decision making with the family. It is possible that they will need to make decisions regarding dialysis in the next few days and given his dementia and strokes, I am not certain that dialysis adds benefit to his overall prognosis.   Plan:   -Check complement levels  -Check lactic acid  -Agree with bicarbonate IVF  -Recheck ABG  -May need more aggressive therapy for the hyperkalemia    Will follow closely with you

## 2017-03-01 NOTE — PROGRESS NOTES
Date of Outreach Update:  Anton Low was seen and assessed. Previous Outreach assessment has been reviewed. There have been no significant clinical changes since the completion of the last dated Outreach assessment.     Signed By: Tootie Herr RN     March 1, 2017 3:17 AM

## 2017-03-01 NOTE — PROGRESS NOTES
END OF SHIFT NOTE:    INTAKE/OUTPUT  02/28 0701 - 03/01 0700  In: 7487 [I.V.:1186]  Out: 675 [Urine:375; Drains:300]  Voiding: YES  Catheter: YES  Drain:   Mary Springer #1 02/24/17 Right Abdomen (Active)   Site Assessment Clean, dry, & intact 2/28/2017 11:23 PM   Dressing Status Clean, dry, & intact 2/28/2017 11:23 PM   Drainage Description Serous 2/28/2017 11:23 PM   Shay Drain Airleak No 2/25/2017 11:55 PM   Status Patent; Charged;Draining 2/28/2017 11:23 PM   Output (ml) 50 ml 3/1/2017  3:00 AM               Flatus: Patient does have flatus present. Stool:  3 occurrences. Characteristics:       Emesis: 0 occurrences. Characteristics:        VITAL SIGNS  Patient Vitals for the past 12 hrs:   Temp Pulse Resp BP SpO2   03/01/17 0710 98 °F (36.7 °C) 65 18 120/70 96 %   03/01/17 0300 99.2 °F (37.3 °C) 60 19 124/55 98 %   03/01/17 0040 - 60 20 122/62 99 %   02/28/17 2300 99.2 °F (37.3 °C) 60 19 101/47 92 %   02/28/17 2010 100.1 °F (37.8 °C) 61 20 129/58 90 %       Pain Assessment  Pain Intensity 1: 0 (02/28/17 2022)  Pain Location 1: Abdomen, Back  Pain Intervention(s) 1: Ambulation/Increased Activity, Nurse notified, Repositioned  Patient Stated Pain Goal: 0    Ambulating  No    Shift report given to oncoming nurse at the bedside.     Constance Catherine RN

## 2017-03-01 NOTE — PROGRESS NOTES
Date of Outreach Update:  Hannah Adame was seen and assessed. Previous Outreach assessment has been reviewed. There have been no significant clinical changes since the completion of the last dated Outreach assessment.     Signed By: Elaine Bach RN     March 1, 2017 12:31 AM

## 2017-03-01 NOTE — PROGRESS NOTES
[facility] CRITICAL CARE OUTREACH NURSE PROGRESS REPORT      SUBJECTIVE: Called to assess patient secondary to Code S/rapid response called yesterday. MEWS Score: 2 (03/01/17 0300)  Vitals:    02/28/17 2300 03/01/17 0040 03/01/17 0300 03/01/17 0710   BP: 101/47 122/62 124/55 120/70   Pulse: 60 60 60 65   Resp: 19 20 19 18   Temp: 99.2 °F (37.3 °C)  99.2 °F (37.3 °C) 98 °F (36.7 °C)   SpO2: 92% 99% 98% 96%   Weight:       Height:            LAB DATA:    Recent Labs      02/28/17   1545  02/27/17   1435   NA  143  143   K  5.8*  4.0   CL  109*  108*   CO2  19*  26   AGAP  15  9   GLU  143*  197*   BUN  28*  19   CREA  1.93*  1.28   GFRAA  43*  >60   GFRNA  36*  57*   CA  7.7*  7.5*   ALB   --   1.9*   TP   --   6.0*   GLOB   --   4.1*   AGRAT   --   0.5*   ALT   --   21        Recent Labs      02/28/17   1545  02/27/17   1435   WBC  7.2  6.9   HGB  8.6*  8.5*   HCT  26.4*  26.0*   PLT  233  274          OBJECTIVE: On arrival to room, I found patient to be resting quietly in bed with wife and daughter at bedside. Pain Assessment  Pain Intensity 1: 0 (03/01/17 0849)  Pain Location 1: Abdomen, Back  Pain Intervention(s) 1: Ambulation/Increased Activity, Nurse notified, Repositioned  Patient Stated Pain Goal: 0                                 ASSESSMENT:  Patient remains lethargic, will barely open eyes to light touch with no verbal effort. Patient will squeeze left hand on command but no effort made on right. Patient has slight withdrawal with nailbed pressure on R side. Spontaneous movement of bilateral lower extremities. PLAN:  Will continue to follow per outreach program protocol.      Micha Mcdermott RN

## 2017-03-02 NOTE — PROGRESS NOTES
[facility] [address] CRITICAL CARE OUTREACH NURSE PROGRESS REPORT    SUBJECTIVE: Assessed patient secondary to Code S/ rapid response yesterday. MEWS Score: 1 (03/01/17 1900)  Vitals:    03/01/17 1135 03/01/17 1630 03/01/17 1900 03/01/17 2105   BP: 141/57 142/60 158/63    Pulse: 60 62 63    Resp: 18 18 19    Temp: 97.9 °F (36.6 °C) 97.2 °F (36.2 °C) 98.4 °F (36.9 °C)    SpO2: 99% 99% 98% 97%   Weight:       Height:           LAB DATA:    Recent Labs      03/01/17   1018  02/28/17   1545  02/27/17   1435   NA  145  143  143   K  5.9*  5.8*  4.0   CL  110*  109*  108*   CO2  15*  19*  26   AGAP  20*  15  9   GLU  186*  143*  197*   BUN  44*  28*  19   CREA  2.63*  1.93*  1.28   GFRAA  30*  43*  >60   GFRNA  25*  36*  57*   CA  8.0*  7.7*  7.5*   ALB   --    --   1.9*   TP   --    --   6.0*   GLOB   --    --   4.1*   AGRAT   --    --   0.5*   ALT   --    --   21        Recent Labs      03/01/17   1018  02/28/17   1545  02/27/17   1435   WBC   --   7.2  6.9   HGB  8.0*  8.6*  8.5*   HCT  24.7*  26.4*  26.0*   PLT   --   233  274          OBJECTIVE: On arrival to room, I found patient to be resting in bed, family at bedside. Pain Assessment  Pain Intensity 1: 0 (03/01/17 1407)  Pain Location 1: Abdomen, Back  Pain Intervention(s) 1: Ambulation/Increased Activity, Nurse notified, Repositioned  Patient Stated Pain Goal: 0    ASSESSMENT:  Patient is minimally responsive with grimacing to pain only. He does not follow any commands or attempt to verbalize orientation. Family at bedside states he has been unresponsive this way all afternoon. After discussion with them, they wish for the patient to be comfortable and do not wish for resuscitative efforts as the wife states the patient was a DNR at the nursing home and is a DNR now stating \"he would not have wanted that. \" Noted patient is still FULL code in the chart, discussed with primary RN who was at bedside during discussion with family regarding their wishes for the patient and is notifying the primary MD for comfort measure and DNR orders. Family is at peace with the situation and states they do not want him to suffer. PLAN:  Patient will drop off our list as there are no further critical care needs.

## 2017-03-02 NOTE — PROGRESS NOTES
While rounding, outreach nurse, Mirna Contreras RN discussed code status with family. Family stated that patient should be DNR as he was at nursing home and now requesting to be comfort measures only. Mirna Contreras RN requesting that Primary nurse, Kay Vicente RN place call to Dr. Maddison Cruz  for code status change to DNR, comfort measures only. Call placed to Dr. Peri Atkinson, on call for Dr. Maddison Cruz. Dr. Peri Atkinson upset that patient was admitted on 2/16117 and this is just now being addressed and should have been addressed earlier during the day as pt had previous rapid responses and code S called and he, the On call Doctor does not know and has not assessed the patient.  DNR orders will be given and further orders to be addressed by Dr. Maddison Cruz in the am.

## 2017-03-02 NOTE — DISCHARGE SUMMARY
Hospitalist Discharge Summary     Patient ID:  Ivette Rodriguez  [id number]  48 y.o.  1934  Admit date: 2/15/2017  8:09 AM  Discharge date and time: 3/2/2017  Attending: Jessica Ta MD  PCP:  Clifford Fernández MD  Treatment Team: Attending Provider: Jessica Ta MD; Utilization Review: Shahrzad Medina, RN; Consulting Provider: Meka Brasher MD; Consulting Provider: Tyrone Stallings MD; Care Manager: Irma Arita RN; Consulting Provider: Mirna Calero MD; Consulting Provider: Ajit Thompson MD; Consulting Provider: Isauro Mejias MD    Principal Diagnosis Chest pain   Principal Problem:    Chest pain (2/16/2017)    Active Problems:    Orthostatic hypotension (7/21/2014)      CAD (coronary artery disease) (7/21/2014)      Diabetes mellitus, type II, insulin dependent ([address] (7/21/2014)      CKD (chronic kidney disease) stage 3, GFR 30-59 ml/min (7/21/2014)      Overview: Baseline Cr~1.9mg/dl      Debility (4/11/2015)      Acute encephalopathy (4/11/2015)      Fever (2/16/2017)      Leukocytosis (2/16/2017)      RUQ abdominal pain (2/20/2017)      Thrombocytopenia ([address] (2/21/2017)      Pacemaker (2/23/2017)      Cerebrovascular accident (CVA) due to embolism ([address] (3/1/2017)      JIM (acute kidney injury) ([address] (0/9/3978)      Metabolic acidosis (8/9/5084)             Hospital Course:  Please refer to the admission H&P for details of presentation. In summary, the patient was admitted to cardiology for chest pain but negative cardiac workup but was encephalopathic and thought to be gallbladder, did have clearing of mental status and had cholecystectomy and initially did well, though he began to decline post op and found to have likely embolic cva of supra and infratentorial brain as well as progressing renal failure.  As patient was not awake enough to eat or participate in care after lengthy discussions with family and the poor prognosis with cva and renal failure, they opted for comfort care and pt to go back to nursing home and be enrolled in hospice    Significant Diagnostic Studies: cholecystectomy      Labs: Results:       Chemistry Recent Labs      03/01/17   1018  02/28/17   1545   GLU  186*  143*   NA  145  143   K  5.9*  5.8*   CL  110*  109*   CO2  15*  19*   BUN  44*  28*   CREA  2.63*  1.93*   CA  8.0*  7.7*   AGAP  20*  15      CBC w/Diff Recent Labs      03/01/17   1018  02/28/17   1545   WBC   --   7.2   RBC   --   2.63*   HGB  8.0*  8.6*   HCT  24.7*  26.4*   PLT   --   233      Cardiac Enzymes No results for input(s): CPK, CKND1, AMY in the last 72 hours. No lab exists for component: CKRMB, TROIP   Coagulation No results for input(s): PTP, INR, APTT in the last 72 hours. No lab exists for component: INREXT    Lipid Panel Lab Results   Component Value Date/Time    Cholesterol, total 127 02/16/2017 05:05 AM    HDL Cholesterol 51 02/16/2017 05:05 AM    LDL, calculated 60 02/16/2017 05:05 AM    VLDL, calculated 16 02/16/2017 05:05 AM    Triglyceride 80 02/16/2017 05:05 AM    CHOL/HDL Ratio 2.5 02/16/2017 05:05 AM      BNP No results for input(s): BNPP in the last 72 hours. Liver Enzymes No results for input(s): TP, ALB, TBIL, AP, SGOT, GPT in the last 72 hours. No lab exists for component: DBIL   Thyroid Studies Lab Results   Component Value Date/Time    TSH 1.410 02/21/2017 06:02 AM            Discharge Exam:  Visit Vitals    /74    Pulse 72    Temp 98.2 °F (36.8 °C)    Resp 18    Ht 5' 9\" (1.753 m)    Wt 78.8 kg (173 lb 11.2 oz)    SpO2 92%    BMI 25.65 kg/m2   see exam from today  [place] [id number] home to go hospice  Discharge Condition: stable  Patient Instructions:   Current Discharge Medication List      START taking these medications    Details   ondansetron (ZOFRAN ODT) 4 mg disintegrating tablet Take 1 Tab by mouth every eight (8) hours as needed for Nausea.   Qty: 20 Tab, Refills: 0      oxyCODONE IR (ROXICODONE) 5 mg immediate release tablet 1-2 tab q4h prn pain  Qty: 50 Tab, Refills: 0      oseltamivir (TAMIFLU) 30 mg capsule Take 1 Cap by mouth daily for 3 days. Qty: 3 Cap, Refills: 0      levoFLOXacin (LEVAQUIN) 500 mg tablet Take 1 Tab by mouth daily.   Qty: 6 Tab, Refills: 0         STOP taking these medications       polyethylene glycol (MIRALAX) 17 gram/dose powder Comments:   Reason for Stopping:         midodrine (PROAMITINE) 5 mg tablet Comments:   Reason for Stopping:         insulin NPH/insulin regular (NOVOLIN 70/30) 100 unit/mL (70-30) injection Comments:   Reason for Stopping:         therapeutic multivitamin (THERA) tablet Comments:   Reason for Stopping:         cholecalciferol, VITAMIN D3, (VITAMIN D3) 5,000 unit tab tablet Comments:   Reason for Stopping:         nicotine (NICORETTE) 2 mg gum Comments:   Reason for Stopping:         nitroglycerin (NITROSTAT) 0.4 mg SL tablet Comments:   Reason for Stopping:         calcium-cholecalciferol, d3, (CALCIUM 600 + D) 600-125 mg-unit tab Comments:   Reason for Stopping:         magnesium oxide (MAG-OX) 400 mg tablet Comments:   Reason for Stopping:         promethazine (PHENERGAN) 25 mg tablet Comments:   Reason for Stopping:         sennosides 8.6 mg cap Comments:   Reason for Stopping:         tamsulosin (FLOMAX) 0.4 mg capsule Comments:   Reason for Stopping:         carbidopa-levodopa (SINEMET)  mg per tablet Comments:   Reason for Stopping:         metFORMIN (GLUCOPHAGE) 500 mg tablet Comments:   Reason for Stopping:         memantine ER (NAMENDA XR) 28 mg capsule Comments:   Reason for Stopping:         omeprazole (PRILOSEC) 20 mg capsule Comments:   Reason for Stopping:         ALPRAZolam (XANAX) 1 mg tablet Comments:   Reason for Stopping:         aspirin delayed-release 81 mg tablet Comments:   Reason for Stopping:         clopidogrel (PLAVIX) 75 mg tablet Comments:   Reason for Stopping:         atorvastatin (LIPITOR) 80 mg tablet Comments:   Reason for Stopping:         traMADol (ULTRAM) 50 mg tablet Comments:   Reason for Stopping:         acetaminophen (TYLENOL) 325 mg tablet Comments:   Reason for Stopping:               Activity:as tolerated  Diet: npo  Wound Care: dry dressing to abd wound    Follow-up  ·   With hospice  Time spent to discharge patient 35 minutes  Signed:  Jessica Ta MD  3/2/2017  3:39 PM

## 2017-03-02 NOTE — CONSULTS
[facility]    Came by to see patient. Family was discussing comfort care status with team, and thanked me for coming. Will sign off. Reconsult prn.     Valerie Cleaning MD

## 2017-03-02 NOTE — HOSPICE
[facility]-    Met with pt's daughter and wife to discuss hospice services/support. Discussed hospice philosphy, care at home, care team members and freq of visits. Also discussed the [facility] for respite and symptom management. Pt has been a resident at [facility] for over a year and his wife has lived there 6 months and the family feel that the pt would want to \"return to his home (NHC)\" for comfort care only. We offered GIP level of care at the [facility] but are not able to offer a one-time GIP contract at [facility] at this time.      Thank you-  Tarik Alegria RN  [facility] liaison  [phone]

## 2017-03-02 NOTE — PROGRESS NOTES
[facility] transport arranged for return to [facility], room [address]B, report [phone], with [address] to meet Mr. Tatum Castle and admit to hospice. [facility] evaluated, but with Medicaid CLTC requirement (per call to Ms. Downs Heading at [facility], cannot speed it up, would take about 7 days), would have to do GIP contract, which [facility] declined Basil Paige, per Ms. Anusha Unger, RN). Family is ok with this, as is Dr. Mery Houston and Dr. Ericka Harris, and RN Sangeeta Rollins.

## 2017-03-02 NOTE — PROGRESS NOTES
Spoke with  regarding recent order to insert NG tube. Per family request, NG tube insertion on hold.  Is ok with request to hold NG tube. Pt remains NPO with IV fluids infusing. Family at the bedside during my shift.

## 2017-03-02 NOTE — PROGRESS NOTES
END OF SHIFT NOTE:    INTAKE/OUTPUT  02/28 0701 - 03/01 0700  In: 6729 [I.V.:1186]  Out: 675 [Urine:375; Drains:300]  Voiding: YES  Catheter: YES  Drain:   Kennard Severs #1 02/24/17 Right Abdomen (Active)   Site Assessment Drainage (comment) 3/1/2017  5:29 PM   Dressing Status Wet 3/1/2017  5:29 PM   Drainage Description Serous 3/1/2017  5:29 PM   Shay Drain Airleak No 2/25/2017 11:55 PM   Status Patent; Charged;Draining 3/1/2017  5:29 PM   Output (ml) 50 ml 3/1/2017  7:00 PM               Flatus: Patient does have flatus present. Stool:  0 occurrences. Characteristics:       Emesis: 0 occurrences. Characteristics:        VITAL SIGNS  Patient Vitals for the past 12 hrs:   Temp Pulse Resp BP SpO2   03/01/17 1900 98.4 °F (36.9 °C) 63 19 158/63 98 %   03/01/17 1630 97.2 °F (36.2 °C) 62 18 142/60 99 %   03/01/17 1135 97.9 °F (36.6 °C) 60 18 141/57 99 %       Pain Assessment  Pain Intensity 1: 0 (03/01/17 1407)  Pain Location 1: Abdomen, Back  Pain Intervention(s) 1: Ambulation/Increased Activity, Nurse notified, Repositioned  Patient Stated Pain Goal: 0    Ambulating  No    Shift report given to oncoming nurse at the bedside.     Roger Tinoco RN

## 2017-03-02 NOTE — PROGRESS NOTES
Michael Hale M.D. Colon and Rectal Surgeon  [facility], [address] [facility], [facility] [address]  Phone: [phone] Fax: [phone]      Alejandro Nash  [id number]    SUBJECTIVE:  Alejandro Nash is a 80y.o. year old male who I was asked to see regarding possible cholecystitis. On my initial evaluation, he was lethargic because of a recent pain medication administration, and history came from the chart and his daughter in the room. The patient developed chest pain on Wednesday of the week prior to admission. He came to the hospital and was admitted. He has been worked up from a cardiac standpoint and this was negative. Pain progressively moved from his chest, then into the center of his back, and then into his right shoulder. Daughter stated he has had abdominal pain for the last ~24 hours. No n/v. He has had fevers up to 101.7 and leukocytosis of 17 (2/16) in the hospital. Hospitalist saw the patient and started Tamiflu due to fevers, rigors, and diffuse body aches. Due to persistent symptoms, CT and Ultrasound were done. US showed no gallstones but some gallbladder wall thickening. HIDA showed a non-filling gallbladder. He was taken 2/24/17 for open cholecystectomy. Had change in mental status, and stroke workup ensued. Found to have multiple acute infarctions on MRI, most consistent with embolic disease. Also with worsening JIM. No acute changes in last 24 hrs. Nephro has seen and family not interested in dialysis. Their primary goal is patient's comfort. They are also not interested in a feeding tube, either temporary via NG or more permanent PEG access. Wife and daughter in room this AM, reports patient stable from yesterday. Not moving right arm. Davis in for urinary retention.   He does not answer any questions this AM.  No PO intake yesterday    PHYSICAL EXAM:   Patient Vitals for the past 24 hrs:   BP Temp Pulse Resp SpO2   03/02/17 0653 147/72 99.1 °F (37.3 °C) 66 18 90 %   03/02/17 0400 137/63 98.4 °F (36.9 °C) 79 19 92 %   03/01/17 2300 145/56 97.9 °F (36.6 °C) 63 19 98 %   03/01/17 2105 - - - - 97 %   03/01/17 1900 158/63 98.4 °F (36.9 °C) 63 19 98 %   03/01/17 1630 142/60 97.2 °F (36.2 °C) 62 18 99 %   03/01/17 1135 141/57 97.9 °F (36.6 °C) 60 18 99 %       General--awake, opens eyes and moves left arm purposefully, but does not answer questions or move right arm. Appears comfortable  Abdomen--soft, nondistended. Today, seems nontender on palpation. No peritonitis. Incisions clean, dry, and approximated, no erythema/drainage. MARSHA with serous fluid. UOP: 200/275  MARSHA: 340/60  BM: +1    Recent Labs      03/01/17   1018  02/28/17   1545  02/27/17   1435   WBC   --   7.2  6.9   HGB  8.0*  8.6*  8.5*   HCT  24.7*  26.4*  26.0*   PLT   --   233  274       Recent Labs      03/01/17   1018  02/28/17   1545  02/27/17   1435   NA  145  143  143   K  5.9*  5.8*  4.0   CL  110*  109*  108*   CO2  15*  19*  26   BUN  44*  28*  19   CREA  2.63*  1.93*  1.28   TBILI   --    --   0.5   SGOT   --    --   58*   ALT   --    --   21   AP   --    --   253*       UA with 30 protein, trace ketones, small bili, no LE or nitrites, bact negative  Trending troponins negative  Lipids normal  Urine cx 2/16/17--neg x2 days  Blood cx 2/16/17--neg x5 days  Blood cx 2/18/17--neg x 5 days  Blood cx 2/19/17--neg x 5 days  Gallbladder fluid culture 2/24/17--heavy e. coli  Flu negative  Procalcitonin 2/20/17--negative  UA 2/28/17--100+protein, trace ketone, small blood, small bili, small LE, 4+bacteria  Above labs reviewed by me. Echo 2/16/17--\"SUMMARY:  Left ventricle: Systolic function was at the lower limits of normal. Ejection fraction was estimated in the range of 50 % to 55 %. There were no regional wall motion abnormalities. There was mild concentric hypertrophy.  Right ventricle: There was mild pulmonary artery hypertension. Right atrium: The atrium was mildly dilated. There was a possible, large, spherical, solid, fixed mass in the atrial cavity. The possibility that this Is a tumor cannot be excluded.  Mitral valve: There was mild regurgitation. \"     IMAGING: Images reviewed by me. 2/15/17--CT angio: \"IMPRESSION:  No acute arterial findings. However, there is extensive coronary artery disease present.  Extensive bilateral pulmonary parenchymal disease, worse within the right lung. \"  2/18/17--CT abd/pelvis: \"IMPRESSION: There is evidence of mild gallbladder wall thickening with new fluid/stranding in the adjacent right upper quadrant, this is nonspecific but given history of abdominal pain and fever raises concern for cholecystitis. Consider further evaluation with right upper quadrant ultrasound if clinically warranted.   New small right pleural effusion with persistent parenchymal disease in the visualized lung bases. \"  2/19/17--RUQ US: \"IMPRESSION: Edematous appearing gallbladder wall thickening is nonspecific without evidence of cholelithiasis and reportedly negative sonographic Higgins's sign. Cholecystitis cannot be completely excluded, other possible etiology include but are not limited to volume overload, hepatic failure, or renal failure. \"  2/20/17--HIDA: \"IMPRESSION: No evidence of bile duct obstruction. The gallbladder is never visualized. The findings are concerning for acute cholecystitis, especially given the appearance of the gallbladder on recent CT and ultrasound. \"  2/28/17--CT head: \"IMPRESSION:  EXTENSIVE SMALL VESSEL ISCHEMIC DISEASE WITH A NEW FOCAL HYPODENSITY CENTRALLY  IN THE LEFT CENTRUM SEMIOVALE COMPARED WITH APRIL 2015 WHICH COULD REPRESENT SUBACUTE ISCHEMIC LACUNAR INFARCTION IN THIS CLINICAL SETTING. IF FURTHER IMAGING EVALUATION IS CLINICALLY INDICATED AT THIS TIME, THEN MRI WOULD BE MOST USEFUL. \"     ASSESSMENT:   Acute cholecystitis with gallbladder empyema and perforation   S/p open cholecystectomy  Acute CVAs  JIM--worsening  Abdominal pain, due to above--improved  Anemia, dilutional and phlebotomy  Thrombocytopenia--resolved  Urinary retention--  Leukocytosis--resolved  Mildly elevated LFTs  Hypokalemia--resolved  Hyperkalemia    PLAN:  --continue PRN pain medications. Continue home meds. --Home meds for chronic CV issues. --NPO for now.    --mcgrath in. Follow UOP and creat. Nephro signed off as family leaning toward comfort measures. --follow H&H, bmp. Transfuse for hgb <7 or s/s of hypoperfusion related to low Hgb  --stop antibiotics as he has received almost 1 week after nuno, WBC normal and no fevers. --continue IVF for now  --ASAP palliative care consult as family now considering comfort measures.   May benefit from hospice eval.  --ok to restart plavix from my standpoint       Higinio Spear MD

## 2017-03-02 NOTE — PROGRESS NOTES
Palliative Care    Patient: Yen Gonzáles MRN: [id number]  SSN: [id number]    YOB: 1934  Age: 80 y.o. Sex: male       Date of Request: 3/2/2017  Date of Consult:  3/2/2017  Reason for Consult:  family support and education and goals of care  Requesting Physician: Sarai Young      Assessment/Plan:     Principal Diagnosis:    Altered Mental Status R41.82    Additional Diagnoses:   Dementia  F03.90  Encephalopathy, Unspecified  G93.40  Counseling, Encounter for Medical Advice  Z71.9  Encounter for Palliative Care  Z51.5    Palliative Performance Scale (PPS):       Medical Decision Making:   Reviewed and summarized provider notes  Discussed case with appropriate providers. IDT   Reviewed laboratory and x-ray data. Radiology, CBC, CMP    Two daughters, niece, son-in-law and wife are at bedside. Patient is resting comfortably in no acute distress. Had lengthy discussion with family concerning patient's medical history and current status. They state that patient was doing fine before his hospitalization and had been living in a nursing home with wife for one year. They report that he was receiving great care there and that he truly enjoyed being there. All of family is in support of pursuing comfort measures at this time. They would like for him to be taken back to [facility], as the staff there love him/know him well, and would like to enroll in hospice there.  will confirm bed placement there. Patient seen in conjunction with Gerson Garcia, TOMMY, physical findings independently confirmed. Met with all family as outlined. They are clear in goals of exclusive focus on comfort. Orders provided for dyspnea, pain, secretions, agitation, MAR revised for goals. Will discuss findings with members of the interdisciplinary team.      Thank you for this referral.   The Palliative Care team is available from 8 am to 4:30 pm Monday-Friday.   Medical management during other hours is per the primary attending service. Subjective:     History obtained from:  Family and Chart    Chief Complaint: AMS  History of Present Illness: This is an 79 y/o M who presented to the ED from [facility] with chest pain. Was admitted for further evaluation and ended up having an open cholecystectomy on 2/24/17. Days later patient experienced a rapid response where he became lethargic and hypotensive. The following day a code stroke was called and imaging confirmed a number of acute stroke events. Since, patient's status has been declining. Kidney function is worsening and he has become unresponsive. Family would like to pursue comfort measures at this time. Advance Directive: No       Code Status:  DNR            Health Care Power of : Unknown    Past Medical History:   Diagnosis Date    CAD (coronary artery disease)     Diabetes ([address]     Hypertension     Neurological disorder     parkinson    Psychiatric disorder       Past Surgical History:   Procedure Laterality Date    CARDIAC SURG PROCEDURE UNLIST       No family history on file. Social History   Substance Use Topics    Smoking status: Never Smoker    Smokeless tobacco: Current User    Alcohol use No     Prior to Admission medications    Medication Sig Start Date End Date Taking? Authorizing Provider   ondansetron (ZOFRAN ODT) 4 mg disintegrating tablet Take 1 Tab by mouth every eight (8) hours as needed for Nausea. 2/24/17  Yes Florin Jenkins MD   oxyCODONE IR (ROXICODONE) 5 mg immediate release tablet 1-2 tab q4h prn pain 2/24/17  Yes Florin Jenkins MD   levoFLOXacin (LEVAQUIN) 500 mg tablet Take 1 Tab by mouth daily. 2/18/17  Yes Carole Vaca MD   polyethylene glycol (MIRALAX) 17 gram/dose powder Take 17 g by mouth every other day. Yes David Carrero MD   midodrine (PROAMITINE) 5 mg tablet Take 5 mg by mouth three (3) times daily.    Yes David Carrero MD   insulin NPH/insulin regular (NOVOLIN 70/30) 100 unit/mL (70-30) injection 30 Units by SubCUTAneous route once. Yes David Carrero MD   therapeutic multivitamin (THERA) tablet Take 1 Tab by mouth daily. Yes David Carrero MD   cholecalciferol, VITAMIN D3, (VITAMIN D3) 5,000 unit tab tablet Take 5,000 Units by mouth daily. Yes David Carrero MD   nicotine (NICORETTE) 2 mg gum Take 2 mg by mouth every three (3) hours as needed for Smoking Cessation. Yes David Carrero MD   nitroglycerin (NITROSTAT) 0.4 mg SL tablet 0.4 mg by SubLINGual route every five (5) minutes as needed for Chest Pain. Yes David Carrero MD   calcium-cholecalciferol, d3, (CALCIUM 600 + D) 600-125 mg-unit tab Take 1 Tab by mouth daily. Yes Historical Provider   magnesium oxide (MAG-OX) 400 mg tablet Take 400 mg by mouth daily. Yes Historical Provider   promethazine (PHENERGAN) 25 mg tablet Take 25 mg by mouth every six (6) hours as needed for Nausea. Yes David Carrero MD   sennosides 8.6 mg cap Take 8.6 mg by mouth two (2) times a day. Yes David Carrero MD   tamsulosin (FLOMAX) 0.4 mg capsule Take 0.4 mg by mouth daily. Yes David Carrero MD   carbidopa-levodopa (SINEMET)  mg per tablet Take 1 Tab by mouth three (3) times daily. Yes David Carrero MD   metFORMIN (GLUCOPHAGE) 500 mg tablet Take 500 mg by mouth two (2) times daily (with meals). Yes David Carrero MD   memantine ER (NAMENDA XR) 28 mg capsule Take  by mouth daily. Yes David Carrero MD   omeprazole (PRILOSEC) 20 mg capsule Take 20 mg by mouth two (2) times a day. Yes Historical Provider   ALPRAZolam Sherrilyn Slime) 1 mg tablet Take 1 mg by mouth nightly. Yes Historical Provider   aspirin delayed-release 81 mg tablet Take 325 mg by mouth daily. Yes Historical Provider   clopidogrel (PLAVIX) 75 mg tablet Take 75 mg by mouth daily. Yes Historical Provider   atorvastatin (LIPITOR) 80 mg tablet Take 40 mg by mouth nightly. Yes Historical Provider   traMADol (ULTRAM) 50 mg tablet Take 50 mg by mouth nightly.  Take 2 tabs by mouth at bedtime   Yes Historical Provider   acetaminophen (TYLENOL) 325 mg tablet Take 500 mg by mouth every four (4) hours as needed for Pain. Yes Historical Provider       Allergies   Allergen Reactions    Naprosyn [Naproxen] Other (comments)    Pravastatin Other (comments)        Review of Systems:  Review of systems not obtained due to patient factors: AMS     Objective:     Visit Vitals    /72    Pulse 66    Temp 99.1 °F (37.3 °C)    Resp 18    Ht 5' 9\" (1.753 m)    Wt 78.8 kg (173 lb 11.2 oz)    SpO2 90%    BMI 25.65 kg/m2        Physical Exam:    General:  No acute distress. Eyes:  Conjunctivae/corneas clear    Nose: Nares normal. Septum midline. Neck: Supple, symmetrical, trachea midline, no JVD   Lungs:   Clear to auscultation bilaterally, unlabored   Heart:  Regular rate and rhythm, no murmur    Abdomen:   Soft,    Extremities:  atraumatic, no cyanosis    Skin: Skin color, texture, turgor normal. No rash or lesions. Psych: Unable to assess. Opens eyes but does not track. Does not follow commands. Minimally responsive.        Assessment:     Hospital Problems  Date Reviewed: 1/19/2017          Codes Class Noted POA    Cerebrovascular accident (CVA) due to embolism [facility]) ICD-10-CM: I63.9  ICD-9-CM: 434.11  3/1/2017 Unknown        JIM (acute kidney injury) ([facility] ICD-10-CM: N17.9  ICD-9-CM: 584.9  3/1/2017 Unknown        Metabolic acidosis VRO-77-ME: E87.2  ICD-9-CM: 276.2  3/1/2017 Unknown        Pacemaker ICD-10-CM: Z95.0  ICD-9-CM: V45.01  2/23/2017 Unknown        Thrombocytopenia ([facility] ICD-10-CM: D69.6  ICD-9-CM: 287.5  2/21/2017 Yes        RUQ abdominal pain ICD-10-CM: R10.11  ICD-9-CM: 789.01  2/20/2017 No        * (Principal)Chest pain ICD-10-CM: R07.9  ICD-9-CM: 786.50  2/16/2017 Unknown        Fever ICD-10-CM: R50.9  ICD-9-CM: 780.60  2/16/2017 Unknown        Leukocytosis ICD-10-CM: E06.339  ICD-9-CM: 288.60  2/16/2017 Unknown        Debility (Chronic) ICD-10-CM: R53.81  ICD-9-CM: 799.3  4/11/2015 Yes        Acute encephalopathy ICD-10-CM: G93.40  ICD-9-CM: 348.30  4/11/2015 Yes        Orthostatic hypotension (Chronic) ICD-10-CM: I95.1  ICD-9-CM: 458.0  7/21/2014 Yes        CAD (coronary artery disease) (Chronic) ICD-10-CM: I25.10  ICD-9-CM: 414.00  7/21/2014 Yes        Diabetes mellitus, type II, insulin dependent (HCC) (Chronic) ICD-10-CM: E11.9, Z79.4  ICD-9-CM: 250.00, V58.67  7/21/2014 Yes        CKD (chronic kidney disease) stage 3, GFR 30-59 ml/min (Chronic) ICD-10-CM: N18.3  ICD-9-CM: 585.3  7/21/2014 Yes    Overview Signed 7/21/2014 10:34 PM by Kali Wagner Cr~1.9mg/dl                   Signed By: Tejinder Calix     March 2, 2017

## 2017-03-02 NOTE — PROGRESS NOTES
Problem: Nutrition Deficit  Goal: *Optimize nutritional status  Nutrition: Received consult for TF management per nutritional support protocols. Pt now comfort care with no plans for NGT placement and TF. Will sign off.   Luz Marina Vuong, [address] [place] [address], [address] [place] [address]

## 2017-03-02 NOTE — PROGRESS NOTES
End of shift summary:  Patient has been restless most of the night. Another dose of morphine given per family request. Remains on NSR. Vital signs stable. No labs or blood sugar checked this morning as family has requested comfort measures and will talk to the MD this morning. Low urine output, MARSHA with small amount of serous drainage. Family at bedside.

## 2017-03-02 NOTE — PROGRESS NOTES
PT Note: Per RN patient's family has elected to pursue comfort measures only. Will discharge from services at this time. Please re-consult PT if change in status occurs.     Thank you,  BEHZAD IsidroT

## 2017-03-02 NOTE — PROGRESS NOTES
[facility]    Events noted. Patient is comfort care. Will sign off. No bill associated with this note. Thanks.     Salome Otero DO

## 2017-03-02 NOTE — PROGRESS NOTES
SPEECH PATHOLOGY:    SLP spoke with Dr. Amy Ambriz ([address]). Patient is DNR and family has elected to provide Comfort Care measures only. Therefore, ST to sign off at this time. Thank you.      ABAD Szymanski, CCC-SLP

## 2017-03-02 NOTE — PROGRESS NOTES
Hospitalist Progress Note    3/2/2017  Admit Date: 2/15/2017  8:09 AM   NAME: Cory Eng   :  1934   MRN:  [id number]   Attending: Sara Lancaster MD  PCP:  Lin Munoz MD    SUBJECTIVE:   Patient still lethargic, not responsive, unchanged, dw family at length about goals of care and he is now comfort care, dw palliative care and hospice      Review of Systems negative with exception of pertinent positives noted above  PHYSICAL EXAM     Visit Vitals    /72    Pulse 66    Temp 99.1 °F (37.3 °C)    Resp 18    Ht 5' 9\" (1.753 m)    Wt 78.8 kg (173 lb 11.2 oz)    SpO2 90%    BMI 25.65 kg/m2      Temp (24hrs), Av.2 °F (36.8 °C), Min:97.2 °F (36.2 °C), Max:99.1 °F (37.3 °C)    Oxygen Therapy  O2 Sat (%): 90 % (17 0653)  Pulse via Oximetry: 83 beats per minute (17 1848)  O2 Device: Nasal cannula (17)  O2 Flow Rate (L/min): 4 l/min (17)    Intake/Output Summary (Last 24 hours) at 17 0944  Last data filed at 17 0720   Gross per 24 hour   Intake             2379 ml   Output              775 ml   Net             1604 ml    General: No acute distress, but not answering questions  Lungs:  CTA Bilaterally.    Heart:  Regular rate and rhythm,  No murmur, rub, or gallop, 1+ generalized edema  Abdomen: Soft, Non distended, Non tender, Positive bowel sounds, MARSHA in R flank  Extremities: No cyanosis, clubbing or edema  Neurologic:  Not moving the right side, not answering question, squeezes left hand, but mostly not purposeful       ASSESSMENT      Active Hospital Problems    Diagnosis Date Noted    Cerebrovascular accident (CVA) due to embolism ([address] 2017    JIM (acute kidney injury) ([address]     Metabolic acidosis     Pacemaker 2017    Thrombocytopenia ([address] 2017    RUQ abdominal pain 2017    Chest pain 2017    Fever 2017    Leukocytosis 2017    Debility 2015    Acute encephalopathy 04/11/2015    CAD (coronary artery disease) 07/21/2014    Orthostatic hypotension 07/21/2014    CKD (chronic kidney disease) stage 3, GFR 30-59 ml/min 07/21/2014     Baseline Cr~1.9mg/dl      Diabetes mellitus, type II, insulin dependent ([facility] 07/21/2014     Plan:  AMS/Acute encephalopathy- unclear cause, Ct with maybe subacute lacunar infarct, could be related to dementia and worsening renal function or lack of getting his medications  -if not improving will need ngt for meds and feeding in am      Acute CVA- appears to be embolic and family going with comfort care   -JIM- increasing cr, nephrology consult, check urine na and cr for FENA     S/p cholecystitis- on merrem     Dm- riss     hypoalbumin-    Hyperkalemic     Metabolic acidosis- add bicarb to fluids    Dispo- dw family, also consulted palliative care, seems to be in dying process, family would like to go back to [facility] with palliative care, will dw dc planner      DVT Prophylaxis: scd        Signed By: Jessica Ta MD     March 2, 2017

## 2017-03-02 NOTE — PROGRESS NOTES
Lethargic:  Patient is lethargic. He is not following commands. Responsive with grimacing to pain only. Family has requested comfort measures only. No medications were admin due to present condition. ivfs continues. Davis in place draining clear yellow urine. MARSHA to RUQ with moderate amount of serous drainage. Heart monitor in place with NSR readings. Other vital signs stable. Pain meds given per family request. Wife and daughter at bedside.

## 2023-06-05 NOTE — PROGRESS NOTES
Dispo update:  Received call from Ms. Sol Ramírez of [facility]. Mr. Johnathan James has been pre-certed by [facility] to return under short-term rehab [facility] pre-cert expires midnight of Wednesday February 22), or if not under rehab, then his 10 day Medicaid bed hold expires midnight of Friday February 24, 2017. Encounter addended by: Carlitos Nelson, PT on: 6/5/2023 12:04 PM   Actions taken: Episode resolved, Clinical Note Signed

## 2024-09-03 NOTE — ROUTINE PROCESS
Reassessment completed. Wife at [facility]. Pt remains very lethargic but family reported he opened his eyes when his wife arrived and said hello calling her name and immediately drifted back to sleep. No distress noted. The patient is a 35y Male complaining of cold symptoms.

## (undated) DEVICE — 2, DISPOSABLE SUCTION/IRRIGATOR WITHOUT DISPOSABLE TIP: Brand: STRYKEFLOW

## (undated) DEVICE — INTENDED FOR TISSUE SEPARATION, AND OTHER PROCEDURES THAT REQUIRE A SHARP SURGICAL BLADE TO PUNCTURE OR CUT.: Brand: BARD-PARKER SAFETY BLADES SIZE 10, STERILE

## (undated) DEVICE — TISSUE RETRIEVAL SYSTEM: Brand: INZII RETRIEVAL SYSTEM

## (undated) DEVICE — SLIM BODY SKIN STAPLER: Brand: APPOSE ULC

## (undated) DEVICE — DERMABOND SKIN ADH 0.7ML -- DERMABOND ADVANCED 12/BX

## (undated) DEVICE — LAP CHOLE: Brand: MEDLINE INDUSTRIES, INC.

## (undated) DEVICE — REM POLYHESIVE ADULT PATIENT RETURN ELECTRODE: Brand: VALLEYLAB

## (undated) DEVICE — VISUALIZATION SYSTEM: Brand: CLEARIFY

## (undated) DEVICE — Device

## (undated) DEVICE — MEDI-VAC NON-CONDUCTIVE SUCTION TUBING: Brand: CARDINAL HEALTH

## (undated) DEVICE — LOGICUT SCISSOR LENGTH 320MM: Brand: LOGI - LAPAROSCOPIC INSTRUMENT SYSTEM

## (undated) DEVICE — SUTURE MCRYL SZ 4-0 L27IN ABSRB UD L19MM PS-2 1/2 CIR PRIM Y426H

## (undated) DEVICE — NEEDLE HYPO 21GA L1.5IN INTRAMUSCULAR S STL LATCH BVL UP

## (undated) DEVICE — [HIGH FLOW INSUFFLATOR,  DO NOT USE IF PACKAGE IS DAMAGED,  KEEP DRY,  KEEP AWAY FROM SUNLIGHT,  PROTECT FROM HEAT AND RADIOACTIVE SOURCES.]: Brand: PNEUMOSURE

## (undated) DEVICE — SUTURE PERMAHAND SZ 2-0 L30IN NONABSORBABLE BLK SILK W/O A305H

## (undated) DEVICE — TRAY CATH 16F URIN MTR LTX -- CONVERT TO ITEM 363111

## (undated) DEVICE — KENDALL SCD EXPRESS SLEEVES, KNEE LENGTH, MEDIUM: Brand: KENDALL SCD

## (undated) DEVICE — SPONGE: SPECIALTY PEANUT XR 100/CS: Brand: MEDICAL ACTION INDUSTRIES

## (undated) DEVICE — E-Z CLEAN, PTFE COATED, ELECTROSURGICAL LAPAROSCOPIC ELECTRODE, J-HOOK, 33 CM., SINGLE-USE, FOR USE WITH HAND CONTROL PENCIL: Brand: MEGADYNE

## (undated) DEVICE — BLADELESS OPTICAL TROCAR WITH FIXATION CANNULA: Brand: VERSAPORT

## (undated) DEVICE — CONTAINER SPEC FRMLN 120ML --

## (undated) DEVICE — SUTURE PDS II SZ 1 L27IN ABSRB VLT CT-1 L36MM 1/2 CIR Z341H

## (undated) DEVICE — DRAIN WND RND 19FR FULL FLTD --

## (undated) DEVICE — (D)PREP SKN CHLRAPRP APPL 26ML -- CONVERT TO ITEM 371833

## (undated) DEVICE — SUTURE SZ 0 27IN 5/8 CIR UR-6  TAPER PT VIOLET ABSRB VICRYL J603H

## (undated) DEVICE — SYR 50ML LR LCK 1ML GRAD NSAF --

## (undated) DEVICE — SOLUTION IRRIG 3000ML 0.9% SOD CHL FLX CONT 0797208] ICU MEDICAL INC]

## (undated) DEVICE — CLIP APPLIER WITH CLIP LOGIC TECHNOLOGY: Brand: ENDO CLIP III

## (undated) DEVICE — BLADE ELECTRODE: Brand: EDGE

## (undated) DEVICE — SPONGE LAP 18X18IN STRL -- 5/PK

## (undated) DEVICE — TROCAR ENDOSCP CONVERTERLESS THRD BLUNTPORT + DISP

## (undated) DEVICE — UNIVERSAL FIXATION CANNULA: Brand: VERSAONE

## (undated) DEVICE — SUTURE VCRL SZ 0 L54IN ABSRB UD LIGAPAK REEL NDL J287G

## (undated) DEVICE — 2000CC GUARDIAN II: Brand: GUARDIAN

## (undated) DEVICE — DRAPE SHT 3 QTR PROXIMA 53X77 --